# Patient Record
Sex: MALE | Race: WHITE | NOT HISPANIC OR LATINO | Employment: OTHER | URBAN - METROPOLITAN AREA
[De-identification: names, ages, dates, MRNs, and addresses within clinical notes are randomized per-mention and may not be internally consistent; named-entity substitution may affect disease eponyms.]

---

## 2017-09-22 ENCOUNTER — RADIANT APPOINTMENT (OUTPATIENT)
Dept: GENERAL RADIOLOGY | Facility: CLINIC | Age: 72
End: 2017-09-22
Attending: PHYSICIAN ASSISTANT
Payer: COMMERCIAL

## 2017-09-22 ENCOUNTER — OFFICE VISIT (OUTPATIENT)
Dept: FAMILY MEDICINE | Facility: CLINIC | Age: 72
End: 2017-09-22
Payer: COMMERCIAL

## 2017-09-22 VITALS
SYSTOLIC BLOOD PRESSURE: 138 MMHG | TEMPERATURE: 98 F | HEART RATE: 74 BPM | RESPIRATION RATE: 16 BRPM | DIASTOLIC BLOOD PRESSURE: 80 MMHG | WEIGHT: 187 LBS | BODY MASS INDEX: 26.18 KG/M2 | HEIGHT: 71 IN | OXYGEN SATURATION: 96 %

## 2017-09-22 DIAGNOSIS — R10.32 LLQ ABDOMINAL PAIN: ICD-10-CM

## 2017-09-22 DIAGNOSIS — R30.0 DYSURIA: Primary | ICD-10-CM

## 2017-09-22 DIAGNOSIS — R39.11 URINARY HESITANCY: ICD-10-CM

## 2017-09-22 DIAGNOSIS — N41.0 PROSTATITIS, ACUTE: ICD-10-CM

## 2017-09-22 DIAGNOSIS — R35.0 URINARY FREQUENCY: ICD-10-CM

## 2017-09-22 LAB
ALBUMIN UR-MCNC: NEGATIVE MG/DL
ANION GAP SERPL CALCULATED.3IONS-SCNC: 12 MMOL/L (ref 3–14)
APPEARANCE UR: CLEAR
BILIRUB UR QL STRIP: NEGATIVE
BUN SERPL-MCNC: 19 MG/DL (ref 7–30)
CALCIUM SERPL-MCNC: 9.1 MG/DL (ref 8.5–10.1)
CHLORIDE SERPL-SCNC: 104 MMOL/L (ref 94–109)
CO2 SERPL-SCNC: 22 MMOL/L (ref 20–32)
COLOR UR AUTO: YELLOW
CREAT SERPL-MCNC: 1.01 MG/DL (ref 0.66–1.25)
ERYTHROCYTE [DISTWIDTH] IN BLOOD BY AUTOMATED COUNT: 12.4 % (ref 10–15)
GFR SERPL CREATININE-BSD FRML MDRD: 72 ML/MIN/1.7M2
GLUCOSE SERPL-MCNC: 93 MG/DL (ref 70–99)
GLUCOSE UR STRIP-MCNC: NEGATIVE MG/DL
HCT VFR BLD AUTO: 45.3 % (ref 40–53)
HGB BLD-MCNC: 15.8 G/DL (ref 13.3–17.7)
HGB UR QL STRIP: ABNORMAL
KETONES UR STRIP-MCNC: NEGATIVE MG/DL
LEUKOCYTE ESTERASE UR QL STRIP: NEGATIVE
MCH RBC QN AUTO: 33.1 PG (ref 26.5–33)
MCHC RBC AUTO-ENTMCNC: 34.9 G/DL (ref 31.5–36.5)
MCV RBC AUTO: 95 FL (ref 78–100)
NITRATE UR QL: NEGATIVE
PH UR STRIP: 5.5 PH (ref 5–7)
PLATELET # BLD AUTO: 199 10E9/L (ref 150–450)
POTASSIUM SERPL-SCNC: 4.1 MMOL/L (ref 3.4–5.3)
RBC # BLD AUTO: 4.78 10E12/L (ref 4.4–5.9)
RBC #/AREA URNS AUTO: NORMAL /HPF
SODIUM SERPL-SCNC: 138 MMOL/L (ref 133–144)
SOURCE: ABNORMAL
SP GR UR STRIP: <=1.005 (ref 1–1.03)
UROBILINOGEN UR STRIP-ACNC: 0.2 EU/DL (ref 0.2–1)
WBC # BLD AUTO: 6.6 10E9/L (ref 4–11)
WBC #/AREA URNS AUTO: NORMAL /HPF

## 2017-09-22 PROCEDURE — 80048 BASIC METABOLIC PNL TOTAL CA: CPT | Performed by: PHYSICIAN ASSISTANT

## 2017-09-22 PROCEDURE — 36415 COLL VENOUS BLD VENIPUNCTURE: CPT | Performed by: PHYSICIAN ASSISTANT

## 2017-09-22 PROCEDURE — 99214 OFFICE O/P EST MOD 30 MIN: CPT | Performed by: PHYSICIAN ASSISTANT

## 2017-09-22 PROCEDURE — 74010 XR KUB: CPT | Mod: 52

## 2017-09-22 PROCEDURE — 85027 COMPLETE CBC AUTOMATED: CPT | Performed by: PHYSICIAN ASSISTANT

## 2017-09-22 PROCEDURE — 81001 URINALYSIS AUTO W/SCOPE: CPT | Performed by: PHYSICIAN ASSISTANT

## 2017-09-22 RX ORDER — SULFAMETHOXAZOLE/TRIMETHOPRIM 800-160 MG
1 TABLET ORAL 2 TIMES DAILY
Qty: 42 TABLET | Refills: 0 | Status: SHIPPED | OUTPATIENT
Start: 2017-09-22 | End: 2017-10-13

## 2017-09-22 RX ORDER — TAMSULOSIN HYDROCHLORIDE 0.4 MG/1
0.4 CAPSULE ORAL DAILY
Qty: 30 CAPSULE | Refills: 1 | Status: SHIPPED | OUTPATIENT
Start: 2017-09-22 | End: 2018-11-16

## 2017-09-22 ASSESSMENT — PAIN SCALES - GENERAL: PAINLEVEL: MODERATE PAIN (5)

## 2017-09-22 NOTE — PROGRESS NOTES
SUBJECTIVE:   Jaron Carrera is a 72 year old male who presents to clinic today for the following health issues:    Genitourinary symptoms      Duration: 5 days    Description:  retention, back pain and pain in testicle    Intensity:  moderate    Accompanying signs and symptoms (fever/discharge/nausea/vomiting/back or abdominal pain):  Pelvic and abdominal    History (frequent UTI's/kidney stones/prostate problems): None  Sexually active: YES    Precipitating or alleviating factors: None    Therapies tried and outcome: heating pad on lower abdominal area and ibuprofen   Outcome: still having issues      Decreased urine stream and difficulty emptying his bladder completely.  Some hesitancy. Some urinary frequency. No f/c. No n/v/d/c.  Some left flank pain.   Problem list and histories reviewed & adjusted, as indicated.  Additional history: as documented    Patient Active Problem List   Diagnosis     Vitamin D deficiency     Vitamin B12 deficiency     CARDIOVASCULAR SCREENING; LDL GOAL LESS THAN 160     Advanced directives, counseling/discussion     Pure hyperglyceridemia     Screening for prostate cancer     Past Surgical History:   Procedure Laterality Date     COLONOSCOPY  3/21/2013    Procedure: COLONOSCOPY;  COLONOSCOPY SCREEN;  Surgeon: Walker Thompson MD;  Location:  OR      ESOPHAGOSCOPY, DIAGNOSTIC       SURGICAL HISTORY OF -   1995    Varicose Veins- Stripping      SURGICAL HISTORY OF -   1965    Lt Knee Surgery torn MM        Social History   Substance Use Topics     Smoking status: Former Smoker     Packs/day: 1.00     Years: 2.00     Types: Cigarettes     Quit date: 7/20/1970     Smokeless tobacco: Never Used     Alcohol use 1.0 oz/week     2 Cans of beer per week      Comment: 1-2 beers weekly     Family History   Problem Relation Age of Onset     Respiratory Father      COPD     Lipids Brother      Prostate Cancer Brother      Other Cancer Brother      Thyroid Disease Daughter       C.A.D. No family hx of      Cancer - colorectal No family hx of      CEREBROVASCULAR DISEASE No family hx of      DIABETES No family hx of          Recent Labs   Lab Test  10/18/16   0805  10/14/15   0839  09/30/14   0855  09/17/13   0833   LDL  110*  117  104  110   HDL  50  49  53  45   TRIG  211*  185*  136  196*   ALT   --    --   34   --    CR   --    --   1.10  1.01   GFRESTIMATED   --    --   66  73   GFRESTBLACK   --    --   80  89   POTASSIUM   --    --   4.0  4.4   TSH   --    --    --   1.80      BP Readings from Last 3 Encounters:   09/22/17 138/80   11/01/16 122/74   10/27/15 133/75    Wt Readings from Last 3 Encounters:   09/22/17 187 lb (84.8 kg)   11/01/16 189 lb (85.7 kg)   10/27/15 187 lb (84.8 kg)                          Reviewed and updated as needed this visit by clinical staffTobacco  Allergies  Meds  Med Hx  Surg Hx  Fam Hx  Soc Hx      Reviewed and updated as needed this visit by Provider         All other systems negative except as outline above  OBJECTIVE:  Eye exam - right eye normal lid, conjunctiva, cornea, pupil and fundus, left eye normal lid, conjunctiva, cornea, pupil and fundus.  CHEST:chest clear to IPPA, no tachypnea, retractions or cyanosis and S1, S2 normal, no murmur, no gallop, rate regular.  Thyroid not palpable, not enlarged, no nodules detected.  Prostate: no tenderness. No obvious swelling or enlargement   ABDOMEN: mild tenderness in the LLQ and lower abdomen area, without rebound, guarding, mass or organomegaly. Abdomen is soft and bowel sounds are normal.  Genitals normal; both testes normal without tenderness, masses, hydroceles, varicoceles, erythema or swelling. Shaft normal, circumcised, meatus normal without discharge. No inguinal hernia noted. No inguinal lymphadenopathy.  Patient denies dysuria    Jaron was seen today for uti.    Diagnoses and all orders for this visit:    Dysuria  -     *UA reflex to Microscopic and Culture ([place] and [facility]  (except [place] and [place])  -     Urine Microscopic    LLQ abdominal pain  -     XR KUB; Future  -     CBC with platelets  -     Basic metabolic panel  (Ca, Cl, CO2, Creat, Gluc, K, Na, BUN)    Urinary frequency  -     Basic metabolic panel  (Ca, Cl, CO2, Creat, Gluc, K, Na, BUN)    Prostatitis, acute  -     CBC with platelets    Urinary hesitancy  -     sulfamethoxazole-trimethoprim (BACTRIM DS/SEPTRA DS) 800-160 MG per tablet; Take 1 tablet by mouth 2 times daily for 21 days  -     tamsulosin (FLOMAX) 0.4 MG capsule; Take 1 capsule (0.4 mg) by mouth daily      Advised supportive and symptomatic treatment.  Follow up with Provider - if condition persists or worsens.   work on lifestyle modification

## 2017-09-22 NOTE — NURSING NOTE
"Chief Complaint   Patient presents with     UTI       Initial /80  Pulse 74  Temp 98  F (36.7  C) (Oral)  Resp 16  Ht 5' 10.75\" (1.797 m)  Wt 187 lb (84.8 kg)  SpO2 96%  BMI 26.27 kg/m2 Estimated body mass index is 26.27 kg/(m^2) as calculated from the following:    Height as of this encounter: 5' 10.75\" (1.797 m).    Weight as of this encounter: 187 lb (84.8 kg).  Medication Reconciliation: complete    "

## 2017-09-22 NOTE — MR AVS SNAPSHOT
"              After Visit Summary   9/22/2017    Jaron Carrera    MRN: [id number]           Patient Information     Date Of Birth          1945        Visit Information        Provider Department      9/22/2017 10:00 AM Cirilo Spain PA-C [facility]        Today's Diagnoses     Dysuria    -  1    LLQ abdominal pain        Urinary frequency        Prostatitis, acute        Urinary hesitancy           Follow-ups after your visit        Who to contact     Normal or non-critical lab and imaging results will be communicated to you by Exelonixhart, letter or phone within 4 business days after the clinic has received the results. If you do not hear from us within 7 days, please contact the clinic through FookyZt or phone. If you have a critical or abnormal lab result, we will notify you by phone as soon as possible.  Submit refill requests through TapShield or call your pharmacy and they will forward the refill request to us. Please allow 3 business days for your refill to be completed.          If you need to speak with a  for additional information , please call: [phone]             Additional Information About Your Visit        ExelonixharMuchasa Information     TapShield gives you secure access to your electronic health record. If you see a primary care provider, you can also send messages to your care team and make appointments. If you have questions, please call your primary care clinic.  If you do not have a primary care provider, please call [phone] and they will assist you.        Care EveryWhere ID     This is your Care EveryWhere ID. This could be used by other organizations to access your [place] medical records  [id number]        Your Vitals Were     Pulse Temperature Respirations Height Pulse Oximetry BMI (Body Mass Index)    74 98  F (36.7  C) (Oral) 16 5' 10.75\" (1.797 m) 96% 26.27 kg/m2       Blood Pressure from Last 3 Encounters:   09/22/17 138/80   11/01/16 122/74 "   10/27/15 133/75    Weight from Last 3 Encounters:   09/22/17 187 lb (84.8 kg)   11/01/16 189 lb (85.7 kg)   10/27/15 187 lb (84.8 kg)              We Performed the Following     *UA reflex to Microscopic and Culture ([place] and [facility] (except [place] and [place])     Basic metabolic panel  (Ca, Cl, CO2, Creat, Gluc, K, Na, BUN)     CBC with platelets     Urine Microscopic          Today's Medication Changes          These changes are accurate as of: 9/22/17 11:26 AM.  If you have any questions, ask your nurse or doctor.               Start taking these medicines.        Dose/Directions    sulfamethoxazole-trimethoprim 800-160 MG per tablet   Commonly known as:  BACTRIM DS/SEPTRA DS   Used for:  Urinary hesitancy   Started by:  Cirilo Spain PA-C        Dose:  1 tablet   Take 1 tablet by mouth 2 times daily for 21 days   Quantity:  42 tablet   Refills:  0       tamsulosin 0.4 MG capsule   Commonly known as:  FLOMAX   Used for:  Urinary hesitancy   Started by:  Cirilo Spain PA-C        Dose:  0.4 mg   Take 1 capsule (0.4 mg) by mouth daily   Quantity:  30 capsule   Refills:  1         Stop taking these medicines if you haven't already. Please contact your care team if you have questions.     GLUCOSAMINE SULFATE PO   Stopped by:  Cirilo Spain PA-C                Where to get your medicines      [id number] [place] - [address]  [address] [place] [address]     Phone:  [phone]     sulfamethoxazole-trimethoprim 800-160 MG per tablet    tamsulosin 0.4 MG capsule                Primary Care Provider Office Phone # Fax #    Cirilo Spain PA-C [phone] [address]  [place] [address]        Equal Access to Services     GAURANG TRIMBLE : Agatha grimaldoo Soomaali, waaxda luqadaha, qaybta kaalmada adeegyada, waxay cyrus flores. So [facility] [phone].    ATENCIÓN: Si fahad  español, tiene a francois disposición servicios gratuitos de asistencia lingüística. Jean Marie mcpherson [phone].    We comply with applicable federal civil rights laws and [place] laws. We do not discriminate on the basis of race, color, national origin, age, disability sex, sexual orientation or gender identity.            Thank you!     Thank you for choosing [facility]  for your care. Our goal is always to provide you with excellent care. Hearing back from our patients is one way we can continue to improve our services. Please take a few minutes to complete the written survey that you may receive in the mail after your visit with us. Thank you!             Your Updated Medication List - Protect others around you: Learn how to safely use, store and throw away your medicines at www.disposemymeds.org.          This list is accurate as of: 9/22/17 11:26 AM.  Always use your most recent med list.                   Brand Name Dispense Instructions for use Diagnosis    aspirin 81 MG tablet      1 TABLET DAILY        sulfamethoxazole-trimethoprim 800-160 MG per tablet    BACTRIM DS/SEPTRA DS    42 tablet    Take 1 tablet by mouth 2 times daily for 21 days    Urinary hesitancy       tamsulosin 0.4 MG capsule    FLOMAX    30 capsule    Take 1 capsule (0.4 mg) by mouth daily    Urinary hesitancy       VITAMIN B-12 PO      daily        VITAMIN D PO      daily

## 2018-02-27 ENCOUNTER — OFFICE VISIT (OUTPATIENT)
Dept: FAMILY MEDICINE | Facility: CLINIC | Age: 73
End: 2018-02-27
Payer: COMMERCIAL

## 2018-02-27 VITALS
WEIGHT: 185 LBS | BODY MASS INDEX: 25.9 KG/M2 | RESPIRATION RATE: 18 BRPM | TEMPERATURE: 98.5 F | SYSTOLIC BLOOD PRESSURE: 121 MMHG | HEIGHT: 71 IN | HEART RATE: 94 BPM | DIASTOLIC BLOOD PRESSURE: 72 MMHG | OXYGEN SATURATION: 97 %

## 2018-02-27 DIAGNOSIS — D69.6 THROMBOCYTOPENIA (H): ICD-10-CM

## 2018-02-27 DIAGNOSIS — H53.143 PHOTOPHOBIA OF BOTH EYES: Primary | ICD-10-CM

## 2018-02-27 DIAGNOSIS — G62.9 NEUROPATHY: ICD-10-CM

## 2018-02-27 DIAGNOSIS — R51.9 NONINTRACTABLE EPISODIC HEADACHE, UNSPECIFIED HEADACHE TYPE: ICD-10-CM

## 2018-02-27 LAB
CRP SERPL-MCNC: 60.8 MG/L (ref 0–8)
ERYTHROCYTE [DISTWIDTH] IN BLOOD BY AUTOMATED COUNT: 12.4 % (ref 10–15)
ERYTHROCYTE [SEDIMENTATION RATE] IN BLOOD BY WESTERGREN METHOD: 39 MM/H (ref 0–20)
HCT VFR BLD AUTO: 39.1 % (ref 40–53)
HGB BLD-MCNC: 13.1 G/DL (ref 13.3–17.7)
MCH RBC QN AUTO: 31.1 PG (ref 26.5–33)
MCHC RBC AUTO-ENTMCNC: 33.5 G/DL (ref 31.5–36.5)
MCV RBC AUTO: 93 FL (ref 78–100)
PLATELET # BLD AUTO: 79 10E9/L (ref 150–450)
RBC # BLD AUTO: 4.21 10E12/L (ref 4.4–5.9)
WBC # BLD AUTO: 7.6 10E9/L (ref 4–11)

## 2018-02-27 PROCEDURE — 85652 RBC SED RATE AUTOMATED: CPT | Performed by: PHYSICIAN ASSISTANT

## 2018-02-27 PROCEDURE — 85027 COMPLETE CBC AUTOMATED: CPT | Performed by: PHYSICIAN ASSISTANT

## 2018-02-27 PROCEDURE — 99214 OFFICE O/P EST MOD 30 MIN: CPT | Performed by: PHYSICIAN ASSISTANT

## 2018-02-27 PROCEDURE — 85060 BLOOD SMEAR INTERPRETATION: CPT | Performed by: PHYSICIAN ASSISTANT

## 2018-02-27 PROCEDURE — 36415 COLL VENOUS BLD VENIPUNCTURE: CPT | Performed by: PHYSICIAN ASSISTANT

## 2018-02-27 PROCEDURE — 86140 C-REACTIVE PROTEIN: CPT | Performed by: PHYSICIAN ASSISTANT

## 2018-02-27 RX ORDER — VALACYCLOVIR HYDROCHLORIDE 1 G/1
1000 TABLET, FILM COATED ORAL 3 TIMES DAILY
Qty: 21 TABLET | Refills: 0 | Status: SHIPPED | OUTPATIENT
Start: 2018-02-27 | End: 2018-11-16

## 2018-02-27 RX ORDER — PREDNISONE 10 MG/1
TABLET ORAL
Qty: 42 TABLET | Refills: 0 | Status: SHIPPED | OUTPATIENT
Start: 2018-02-27 | End: 2018-04-24

## 2018-02-27 NOTE — PROGRESS NOTES
SUBJECTIVE:   Jaron Carrera is a 73 year old male who presents to clinic today for the following health issues:      Rash--follow up eyes sensitive to light. Stiff neck, scalp sensitivity with rash as well as stiff/sore shoulders. Sore throat.  Onset: January 2018    Description:     Fatigue. No blurred or double vision. No tooth pain. Still notes a headache. Posterior discomfort with cervicalgia (mostly right sided). No hearing changes or tinnitus. No edema. No n/v/d/c.  Reviewed and updated as needed this visit by clinical staff  Tobacco  Allergies  Meds       Reviewed and updated as needed this visit by Provider         All other systems negative except as outline above  OBJECTIVE:    Eye exam - right eye normal lid, conjunctiva, cornea, pupil and fundus, left eye normal lid, conjunctiva, cornea, pupil and fundus.  Thyroid not palpable, not enlarged, no nodules detected.  CHEST:chest clear to IPPA, no tachypnea, retractions or cyanosis and S1, S2 normal, no murmur, no gallop, rate regular.  ENT exam reveals - ENT exam normal, no neck nodes or sinus tenderness.  Mild scalp redness. No concerning lymphadenopathy.  Some cervical trigger points.  His disks are flat. Pupils equal, round, reactive to light. Extraocular movements full. Visual fields full. Face moves symmetrically. Tongue midline. Hearing mildly decreased to finger-rubbing at approximately 6-8 inches. Neck without bruits. CV: S1, S2. Motor strength 5/5. Reflexes were 2/4. Toe signs were downgoing. Normal position sense. Good finger-nose-finger and fine finger movement. Gait: he sangeeta from a chair without difficulty and has a mildly broad-based gait.  No temporal aa tenderness or bruit.     Jaron was seen today for derm problem.    Diagnoses and all orders for this visit:    Photophobia of both eyes  -     OPHTHALMOLOGY ADULT REFERRAL    Nonintractable episodic headache, unspecified headache type    Neuropathy  -     predniSONE (DELTASONE) 10 MG  tablet; Take 60 mg days 1 and 2, 50 mg days 3 and 4, 40 mg days 5 and 6, 30 mg days 7 and 8, 20 mg days 9 and 10, 10 mg days 11 and 12  -     Erythrocyte sedimentation rate auto  -     CRP inflammation  -     CBC with platelets  -     MR Brain w/o Contrast; Future  -     Erythrocyte sedimentation rate auto; Future  -     valACYclovir (VALTREX) 1000 mg tablet; Take 1 tablet (1,000 mg) by mouth 3 times daily    Thrombocytopenia (H)  -     Blood Morphology Pathologist Review  -     **CBC with platelets FUTURE 14d; Future  -     Cancel: Anaplasma phagocytoph antibody IgG IgM  -     Anaplasma phagocytoph antibody IgG IgM; Future      Recheck cbc , sed rate, and anaplasma via lab visit in 3 days.  Advised supportive and symptomatic treatment.  Follow up with Provider - if condition persists or worsens.

## 2018-02-27 NOTE — MR AVS SNAPSHOT
After Visit Summary   2/27/2018    Jaron Carrera    MRN: [id number]           Patient Information     Date Of Birth          1945        Visit Information        Provider Department      2/27/2018 10:40 AM Cirilo Spain PA-C [facility] [place]        Today's Diagnoses     Photophobia of both eyes    -  1    Nonintractable episodic headache, unspecified headache type        Neuropathy        Thrombocytopenia (H)           Follow-ups after your visit        Additional Services     OPHTHALMOLOGY ADULT REFERRAL       Your provider has referred you to: [facility]: [facility] [place] [phone]   [email or web address]    Please be aware that coverage of these services is subject to the terms and limitations of your health insurance plan.  Call member services at your health plan with any benefit or coverage questions.      Please bring the following with you to your appointment:    (1) Any X-Rays, CTs or MRIs which have been performed.  Contact the facility where they were done to arrange for  prior to your scheduled appointment.    (2) List of current medications  (3) This referral request   (4) Any documents/labs given to you for this referral                  Future tests that were ordered for you today     Open Future Orders        Priority Expected Expires Ordered    **CBC with platelets FUTURE 14d Routine 3/6/2018 3/13/2018 2/27/2018    Erythrocyte sedimentation rate auto Routine  2/27/2019 2/27/2018    MR Brain w/o Contrast Routine  2/27/2019 2/27/2018            Who to contact     Normal or non-critical lab and imaging results will be communicated to you by MyChart, letter or phone within 4 business days after the clinic has received the results. If you do not hear from us within 7 days, please contact the clinic through MyChart or phone. If you have a critical or abnormal lab result, we will notify you by phone as soon as possible.  Submit refill requests  "through [email or web address] or call your pharmacy and they will forward the refill request to us. Please allow 3 business days for your refill to be completed.          If you need to speak with a  for additional information , please call: [phone]             Additional Information About Your Visit        CapzlesharNuView Systems Information     [email or web address] gives you secure access to your electronic health record. If you see a primary care provider, you can also send messages to your care team and make appointments. If you have questions, please call your primary care clinic.  If you do not have a primary care provider, please call [phone] and they will assist you.        Care EveryWhere ID     This is your Care EveryWhere ID. This could be used by other organizations to access your [place] medical records  [id number]        Your Vitals Were     Pulse Temperature Respirations Height Pulse Oximetry BMI (Body Mass Index)    94 98.5  F (36.9  C) (Tympanic) 18 5' 10.75\" (1.797 m) 97% 25.98 kg/m2       Blood Pressure from Last 3 Encounters:   02/27/18 121/72   09/22/17 138/80   11/01/16 122/74    Weight from Last 3 Encounters:   02/27/18 185 lb (83.9 kg)   09/22/17 187 lb (84.8 kg)   11/01/16 189 lb (85.7 kg)              We Performed the Following     Blood Morphology Pathologist Review     CBC with platelets     CRP inflammation     Erythrocyte sedimentation rate auto     OPHTHALMOLOGY ADULT REFERRAL          Today's Medication Changes          These changes are accurate as of 2/27/18 12:22 PM.  If you have any questions, ask your nurse or doctor.               Start taking these medicines.        Dose/Directions    predniSONE 10 MG tablet   Commonly known as:  DELTASONE   Used for:  Neuropathy   Started by:  Cirilo Spain PA-C        Take 60 mg days 1 and 2, 50 mg days 3 and 4, 40 mg days 5 and 6, 30 mg days 7 and 8, 20 mg days 9 and 10, 10 mg days 11 and 12   Quantity:  42 tablet   Refills:  0       valACYclovir " 1000 mg tablet   Commonly known as:  VALTREX   Used for:  Neuropathy   Started by:  Cirilo Spain PA-C        Dose:  1000 mg   Take 1 tablet (1,000 mg) by mouth 3 times daily   Quantity:  21 tablet   Refills:  0            Where to get your medicines      [id number] [place] - [address]  [address] [place] [address]     Phone:  [phone]     predniSONE 10 MG tablet    valACYclovir 1000 mg tablet                Primary Care Provider Office Phone # Fax #    Cirilo Spain PA-C [phone]       [address]  [place] [address]        Equal Access to Services     [facility]: Hadii filomena badillo hadasho Soponchoali, waaxda luqadaha, qaybta kaalmada adeegyada, adina cabrera . So [facility] [phone].    ATENCIÓN: Si habla español, tiene a francois disposición servicios gratuitos de asistencia lingüística. Ll[facility] [phone].    We comply with applicable federal civil rights laws and [place] laws. We do not discriminate on the basis of race, color, national origin, age, disability, sex, sexual orientation, or gender identity.            Thank you!     Thank you for choosing [facility]  for your care. Our goal is always to provide you with excellent care. Hearing back from our patients is one way we can continue to improve our services. Please take a few minutes to complete the written survey that you may receive in the mail after your visit with us. Thank you!             Your Updated Medication List - Protect others around you: Learn how to safely use, store and throw away your medicines at www.disposemymeds.org.          This list is accurate as of 2/27/18 12:22 PM.  Always use your most recent med list.                   Brand Name Dispense Instructions for use Diagnosis    aspirin 81 MG tablet      1 TABLET DAILY        predniSONE 10 MG tablet    DELTASONE    42 tablet    Take 60 mg days 1 and 2, 50 mg  days 3 and 4, 40 mg days 5 and 6, 30 mg days 7 and 8, 20 mg days 9 and 10, 10 mg days 11 and 12    Neuropathy       tamsulosin 0.4 MG capsule    FLOMAX    30 capsule    Take 1 capsule (0.4 mg) by mouth daily    Urinary hesitancy       valACYclovir 1000 mg tablet    VALTREX    21 tablet    Take 1 tablet (1,000 mg) by mouth 3 times daily    Neuropathy       VITAMIN B-12 PO      daily        VITAMIN D PO      daily

## 2018-02-28 ENCOUNTER — RADIANT APPOINTMENT (OUTPATIENT)
Dept: MRI IMAGING | Facility: CLINIC | Age: 73
End: 2018-02-28
Attending: PHYSICIAN ASSISTANT
Payer: COMMERCIAL

## 2018-02-28 ENCOUNTER — TRANSFERRED RECORDS (OUTPATIENT)
Dept: HEALTH INFORMATION MANAGEMENT | Facility: CLINIC | Age: 73
End: 2018-02-28

## 2018-02-28 DIAGNOSIS — G62.9 NEUROPATHY: ICD-10-CM

## 2018-02-28 LAB — COPATH REPORT: NORMAL

## 2018-02-28 PROCEDURE — 70553 MRI BRAIN STEM W/O & W/DYE: CPT | Mod: TC

## 2018-02-28 PROCEDURE — 82565 ASSAY OF CREATININE: CPT

## 2018-02-28 PROCEDURE — A9585 GADOBUTROL INJECTION: HCPCS | Mod: JW | Performed by: PHYSICIAN ASSISTANT

## 2018-02-28 RX ORDER — GADOBUTROL 604.72 MG/ML
10 INJECTION INTRAVENOUS ONCE
Status: COMPLETED | OUTPATIENT
Start: 2018-02-28 | End: 2018-02-28

## 2018-02-28 RX ADMIN — GADOBUTROL 8.5 ML: 604.72 INJECTION INTRAVENOUS at 08:04

## 2018-03-01 LAB
CREAT BLD-MCNC: 0.9 MG/DL (ref 0.5–1.2)
GFR SERPL CREATININE-BSD FRML MDRD: 84 ML/MIN/{1.73_M2}
GFRB: 83

## 2018-03-02 DIAGNOSIS — D69.6 THROMBOCYTOPENIA (H): ICD-10-CM

## 2018-03-02 DIAGNOSIS — G62.9 NEUROPATHY: ICD-10-CM

## 2018-03-02 LAB
ERYTHROCYTE [DISTWIDTH] IN BLOOD BY AUTOMATED COUNT: 12.4 % (ref 10–15)
ERYTHROCYTE [SEDIMENTATION RATE] IN BLOOD BY WESTERGREN METHOD: 26 MM/H (ref 0–20)
HCT VFR BLD AUTO: 39.6 % (ref 40–53)
HGB BLD-MCNC: 13 G/DL (ref 13.3–17.7)
MCH RBC QN AUTO: 30.8 PG (ref 26.5–33)
MCHC RBC AUTO-ENTMCNC: 32.8 G/DL (ref 31.5–36.5)
MCV RBC AUTO: 94 FL (ref 78–100)
PLATELET # BLD AUTO: 169 10E9/L (ref 150–450)
RBC # BLD AUTO: 4.22 10E12/L (ref 4.4–5.9)
WBC # BLD AUTO: ABNORMAL 10E9/L (ref 4–11)

## 2018-03-02 PROCEDURE — 85027 COMPLETE CBC AUTOMATED: CPT | Performed by: PHYSICIAN ASSISTANT

## 2018-03-02 PROCEDURE — 86666 EHRLICHIA ANTIBODY: CPT | Mod: 90 | Performed by: PHYSICIAN ASSISTANT

## 2018-03-02 PROCEDURE — 85652 RBC SED RATE AUTOMATED: CPT | Performed by: PHYSICIAN ASSISTANT

## 2018-03-02 PROCEDURE — 99000 SPECIMEN HANDLING OFFICE-LAB: CPT | Performed by: PHYSICIAN ASSISTANT

## 2018-03-02 PROCEDURE — 36415 COLL VENOUS BLD VENIPUNCTURE: CPT | Performed by: PHYSICIAN ASSISTANT

## 2018-03-04 LAB
A PHAGOCYTOPH IGG TITR SER IF: NORMAL {TITER}
A PHAGOCYTOPH IGM TITR SER IF: NORMAL {TITER}

## 2018-03-05 ENCOUNTER — MYC MEDICAL ADVICE (OUTPATIENT)
Dept: FAMILY MEDICINE | Facility: CLINIC | Age: 73
End: 2018-03-05

## 2018-04-24 ENCOUNTER — OFFICE VISIT (OUTPATIENT)
Dept: FAMILY MEDICINE | Facility: CLINIC | Age: 73
End: 2018-04-24
Payer: COMMERCIAL

## 2018-04-24 VITALS
HEART RATE: 95 BPM | TEMPERATURE: 98.2 F | WEIGHT: 185 LBS | BODY MASS INDEX: 25.9 KG/M2 | DIASTOLIC BLOOD PRESSURE: 70 MMHG | RESPIRATION RATE: 16 BRPM | SYSTOLIC BLOOD PRESSURE: 118 MMHG | OXYGEN SATURATION: 97 % | HEIGHT: 71 IN

## 2018-04-24 DIAGNOSIS — D64.9 LOW HEMOGLOBIN: Primary | ICD-10-CM

## 2018-04-24 DIAGNOSIS — R52 BODY ACHES: ICD-10-CM

## 2018-04-24 DIAGNOSIS — R06.09 DYSPNEA ON EXERTION: ICD-10-CM

## 2018-04-24 DIAGNOSIS — R53.83 FATIGUE, UNSPECIFIED TYPE: ICD-10-CM

## 2018-04-24 LAB
ALBUMIN SERPL-MCNC: 2.8 G/DL (ref 3.4–5)
ALP SERPL-CCNC: 109 U/L (ref 40–150)
ALT SERPL W P-5'-P-CCNC: 28 U/L (ref 0–70)
ANION GAP SERPL CALCULATED.3IONS-SCNC: 8 MMOL/L (ref 3–14)
AST SERPL W P-5'-P-CCNC: 20 U/L (ref 0–45)
BILIRUB SERPL-MCNC: 0.5 MG/DL (ref 0.2–1.3)
BUN SERPL-MCNC: 14 MG/DL (ref 7–30)
CALCIUM SERPL-MCNC: 8.8 MG/DL (ref 8.5–10.1)
CHLORIDE SERPL-SCNC: 101 MMOL/L (ref 94–109)
CK SERPL-CCNC: 49 U/L (ref 30–300)
CO2 SERPL-SCNC: 25 MMOL/L (ref 20–32)
CREAT SERPL-MCNC: 0.91 MG/DL (ref 0.66–1.25)
CRP SERPL-MCNC: 80 MG/L (ref 0–8)
DIFFERENTIAL METHOD BLD: ABNORMAL
ERYTHROCYTE [DISTWIDTH] IN BLOOD BY AUTOMATED COUNT: 13.5 % (ref 10–15)
FERRITIN SERPL-MCNC: 305 NG/ML (ref 26–388)
GFR SERPL CREATININE-BSD FRML MDRD: 82 ML/MIN/1.7M2
GLUCOSE SERPL-MCNC: 80 MG/DL (ref 70–99)
HCT VFR BLD AUTO: 39.3 % (ref 40–53)
HGB BLD-MCNC: 12.6 G/DL (ref 13.3–17.7)
IRON SATN MFR SERPL: 7 % (ref 15–46)
IRON SERPL-MCNC: 19 UG/DL (ref 35–180)
MCH RBC QN AUTO: 28.8 PG (ref 26.5–33)
MCHC RBC AUTO-ENTMCNC: 32.1 G/DL (ref 31.5–36.5)
MCV RBC AUTO: 90 FL (ref 78–100)
PLATELET # BLD AUTO: 187 10E9/L (ref 150–450)
POTASSIUM SERPL-SCNC: 4 MMOL/L (ref 3.4–5.3)
PROT SERPL-MCNC: 8.2 G/DL (ref 6.8–8.8)
RBC # BLD AUTO: 4.38 10E12/L (ref 4.4–5.9)
RETICS # AUTO: 48.3 10E9/L (ref 25–95)
RETICS/RBC NFR AUTO: 1.1 % (ref 0.5–2)
SODIUM SERPL-SCNC: 134 MMOL/L (ref 133–144)
TIBC SERPL-MCNC: 266 UG/DL (ref 240–430)
TSH SERPL DL<=0.005 MIU/L-ACNC: 1.73 MU/L (ref 0.4–4)
VIT B12 SERPL-MCNC: 1291 PG/ML (ref 193–986)
WBC # BLD AUTO: 9.4 10E9/L (ref 4–11)

## 2018-04-24 PROCEDURE — 86747 PARVOVIRUS ANTIBODY: CPT | Mod: 90 | Performed by: PHYSICIAN ASSISTANT

## 2018-04-24 PROCEDURE — 83540 ASSAY OF IRON: CPT | Performed by: PHYSICIAN ASSISTANT

## 2018-04-24 PROCEDURE — 82728 ASSAY OF FERRITIN: CPT | Performed by: PHYSICIAN ASSISTANT

## 2018-04-24 PROCEDURE — 80053 COMPREHEN METABOLIC PANEL: CPT | Performed by: PHYSICIAN ASSISTANT

## 2018-04-24 PROCEDURE — 86038 ANTINUCLEAR ANTIBODIES: CPT | Performed by: PHYSICIAN ASSISTANT

## 2018-04-24 PROCEDURE — 86618 LYME DISEASE ANTIBODY: CPT | Performed by: PHYSICIAN ASSISTANT

## 2018-04-24 PROCEDURE — 82607 VITAMIN B-12: CPT | Performed by: PHYSICIAN ASSISTANT

## 2018-04-24 PROCEDURE — 85060 BLOOD SMEAR INTERPRETATION: CPT | Performed by: PHYSICIAN ASSISTANT

## 2018-04-24 PROCEDURE — 83550 IRON BINDING TEST: CPT | Performed by: PHYSICIAN ASSISTANT

## 2018-04-24 PROCEDURE — 84165 PROTEIN E-PHORESIS SERUM: CPT | Performed by: PHYSICIAN ASSISTANT

## 2018-04-24 PROCEDURE — 86140 C-REACTIVE PROTEIN: CPT | Performed by: PHYSICIAN ASSISTANT

## 2018-04-24 PROCEDURE — 82550 ASSAY OF CK (CPK): CPT | Performed by: PHYSICIAN ASSISTANT

## 2018-04-24 PROCEDURE — 99214 OFFICE O/P EST MOD 30 MIN: CPT | Performed by: PHYSICIAN ASSISTANT

## 2018-04-24 PROCEDURE — 84270 ASSAY OF SEX HORMONE GLOBUL: CPT | Performed by: PHYSICIAN ASSISTANT

## 2018-04-24 PROCEDURE — 82274 ASSAY TEST FOR BLOOD FECAL: CPT | Performed by: PHYSICIAN ASSISTANT

## 2018-04-24 PROCEDURE — 00000402 ZZHCL STATISTIC TOTAL PROTEIN: Performed by: PHYSICIAN ASSISTANT

## 2018-04-24 PROCEDURE — 86431 RHEUMATOID FACTOR QUANT: CPT | Performed by: PHYSICIAN ASSISTANT

## 2018-04-24 PROCEDURE — 36415 COLL VENOUS BLD VENIPUNCTURE: CPT | Performed by: PHYSICIAN ASSISTANT

## 2018-04-24 PROCEDURE — 85025 COMPLETE CBC W/AUTO DIFF WBC: CPT | Performed by: PHYSICIAN ASSISTANT

## 2018-04-24 PROCEDURE — 84403 ASSAY OF TOTAL TESTOSTERONE: CPT | Performed by: PHYSICIAN ASSISTANT

## 2018-04-24 PROCEDURE — 84443 ASSAY THYROID STIM HORMONE: CPT | Performed by: PHYSICIAN ASSISTANT

## 2018-04-24 PROCEDURE — 99000 SPECIMEN HANDLING OFFICE-LAB: CPT | Performed by: PHYSICIAN ASSISTANT

## 2018-04-24 PROCEDURE — 85045 AUTOMATED RETICULOCYTE COUNT: CPT | Performed by: PHYSICIAN ASSISTANT

## 2018-04-24 NOTE — PROGRESS NOTES
SUBJECTIVE:   Jaron Carrera is a 73 year old male who presents to clinic today for the following health issues:      Rash  Onset: 2+ months    Description:   Location: scalp  Character: painful, burning  Itching (Pruritis): YES    Progression of Symptoms:  improving    Accompanying Signs & Symptoms:  Fever: no   Body aches or joint pain: YES  Sore throat symptoms: no   Recent cold symptoms: no     History:   Previous similar rash: YES    Precipitating factors:   Exposure to similar rash: no   New exposures: None   Recent travel: YES    Alleviating factors:      Therapies Tried and outcome: prednisone with some relief    Rash resolved. Still some mild neuralgia on left side of scalp.     Noting some fatigue. No snoring. Lacking energy.   Worse the past 3 wks. No anhedonia.  Some nighttime awakenings (to use the bathroom).  No blood in stools. Some leg and arm pains. No obvious tick bites.   No blood in his stools. No abd pain. No chest pain. No edema.   No palpitations.   Problem list and histories reviewed & adjusted, as indicated.  Additional history: as documented    BP Readings from Last 3 Encounters:   04/24/18 118/70   02/27/18 121/72   09/22/17 138/80    Wt Readings from Last 3 Encounters:   04/24/18 185 lb (83.9 kg)   02/27/18 185 lb (83.9 kg)   09/22/17 187 lb (84.8 kg)                    Reviewed and updated as needed this visit by clinical staff  Tobacco  Allergies  Meds       Reviewed and updated as needed this visit by Provider       All other systems negative except as outline above  OBJECTIVE:  Eye exam - right eye normal lid, conjunctiva, cornea, pupil and fundus, left eye normal lid, conjunctiva, cornea, pupil and fundus.  Thyroid not palpable, not enlarged, no nodules detected.  ENT exam reveals - ENT exam normal, no neck nodes or sinus tenderness.  CHEST:chest clear to IPPA, no tachypnea, retractions or cyanosis and S1, S2 normal, no murmur, no gallop, rate regular.  The abdomen is soft  without tenderness, guarding, mass, rebound or organomegaly. Bowel sounds are normal. No CVA tenderness or inguinal adenopathy noted.  No edema    Jaron was seen today for derm problem.    Diagnoses and all orders for this visit:    Low hemoglobin  -     Fecal colorectal cancer screen FIT; Future    Fatigue, unspecified type  -     Testosterone Free and Total  -     TSH with free T4 reflex  -     Blood Morphology Pathologist Review  -     CBC with platelets differential  -     Reticulocyte Count  -     Protein electrophoresis  -     Comprehensive metabolic panel  -     Vitamin B12  -     Iron and iron binding capacity  -     Ferritin  -     Parvovirus B19 antibodies IgG IgM    Body aches  -     Blood Morphology Pathologist Review  -     CBC with platelets differential  -     Reticulocyte Count  -     Protein electrophoresis  -     Comprehensive metabolic panel  -     Rheumatoid factor  -     CK total  -     SAMMY Scrn Rflx to Titer and Ptrn ([facility])  -     Parvovirus B19 antibodies IgG IgM  -     CRP inflammation      Advised supportive and symptomatic treatment.  Follow up with Provider - if condition persists or worsens.   work on lifestyle modification

## 2018-04-24 NOTE — MR AVS SNAPSHOT
After Visit Summary   4/24/2018    Jaron Carrera    MRN: [id number]           Patient Information     Date Of Birth          1945        Visit Information        Provider Department      4/24/2018 11:20 AM Cirilo Spain PA-C [facility] [place]        Today's Diagnoses     Low hemoglobin    -  1    Fatigue, unspecified type        Body aches           Follow-ups after your visit        Future tests that were ordered for you today     Open Future Orders        Priority Expected Expires Ordered    Fecal colorectal cancer screen FIT Routine 5/15/2018 7/17/2018 4/24/2018            Who to contact     Normal or non-critical lab and imaging results will be communicated to you by L[employer], letter or phone within 4 business days after the clinic has received the results. If you do not hear from us within 7 days, please contact the clinic through [employer] or phone. If you have a critical or abnormal lab result, we will notify you by phone as soon as possible.  Submit refill requests through [employer] or call your pharmacy and they will forward the refill request to us. Please allow 3 business days for your refill to be completed.          If you need to speak with a  for additional information , please call: [phone]             Additional Information About Your Visit        [employer] Information     [employer] gives you secure access to your electronic health record. If you see a primary care provider, you can also send messages to your care team and make appointments. If you have questions, please call your primary care clinic.  If you do not have a primary care provider, please call [phone] and they will assist you.        Care EveryWhere ID     This is your Care EveryWhere ID. This could be used by other organizations to access your [facility] medical records  [id number]        Your Vitals Were     Pulse Temperature Respirations Height Pulse Oximetry BMI (Body Mass Index)  "   95 98.2  F (36.8  C) (Tympanic) 16 5' 10.75\" (1.797 m) 97% 25.98 kg/m2       Blood Pressure from Last 3 Encounters:   04/24/18 118/70   02/27/18 121/72   09/22/17 138/80    Weight from Last 3 Encounters:   04/24/18 185 lb (83.9 kg)   02/27/18 185 lb (83.9 kg)   09/22/17 187 lb (84.8 kg)              We Performed the Following     Anti Nuclear Tanja IgG by IFA with Reflex     Blood Morphology Pathologist Review     CBC with platelets differential     CK total     Comprehensive metabolic panel     CRP inflammation     Ferritin     Iron and iron binding capacity     Parvovirus B19 antibodies IgG IgM     Protein electrophoresis     Reticulocyte Count     Rheumatoid factor     Testosterone Free and Total     TSH with free T4 reflex     Vitamin B12          Today's Medication Changes          These changes are accurate as of 4/24/18 12:41 PM.  If you have any questions, ask your nurse or doctor.               Stop taking these medicines if you haven't already. Please contact your care team if you have questions.     predniSONE 10 MG tablet   Commonly known as:  DELTASONE   Stopped by:  Cirilo Spain PA-C                    Primary Care Provider Office Phone # Fax #    Cirilo Spain PA-C [phone]       [address] [place] [address]        Equal Access to Services     [facility] : Hadii filomena ku hadasho Soomaali, waaxda luqadaha, qaybta kaalmada adeegyada, waxdinesh bridges hayfranklynn gerri cabrera . So [facility] [place] [phone].    ATENCIÓN: Si habla español, tiene a francois disposición servicios gratuitos de asistencia lingüística. Jean Marie al [phone].    We comply with applicable federal civil rights laws and [place] laws. We do not discriminate on the basis of race, color, national origin, age, disability, sex, sexual orientation, or gender identity.            Thank you!     Thank you for choosing [facility]  for your care. Our goal is always to provide you with excellent care. " Hearing back from our patients is one way we can continue to improve our services. Please take a few minutes to complete the written survey that you may receive in the mail after your visit with us. Thank you!             Your Updated Medication List - Protect others around you: Learn how to safely use, store and throw away your medicines at www.disposemymeds.org.          This list is accurate as of 4/24/18 12:41 PM.  Always use your most recent med list.                   Brand Name Dispense Instructions for use Diagnosis    aspirin 81 MG tablet      1 TABLET DAILY        tamsulosin 0.4 MG capsule    FLOMAX    30 capsule    Take 1 capsule (0.4 mg) by mouth daily    Urinary hesitancy       valACYclovir 1000 mg tablet    VALTREX    21 tablet    Take 1 tablet (1,000 mg) by mouth 3 times daily    Neuropathy       VITAMIN B-12 PO      daily        VITAMIN D PO      daily

## 2018-04-25 LAB
ALBUMIN SERPL ELPH-MCNC: 3.1 G/DL (ref 3.7–5.1)
ALPHA1 GLOB SERPL ELPH-MCNC: 0.5 G/DL (ref 0.2–0.4)
ALPHA2 GLOB SERPL ELPH-MCNC: 1.4 G/DL (ref 0.5–0.9)
ANA SER QL IF: NEGATIVE
B BURGDOR IGG+IGM SER QL: 0.01 (ref 0–0.89)
B-GLOBULIN SERPL ELPH-MCNC: 0.8 G/DL (ref 0.6–1)
B19V IGG SER IA-ACNC: 6.96 IV
B19V IGM SER IA-ACNC: 0.12 IV
GAMMA GLOB SERPL ELPH-MCNC: 1.8 G/DL (ref 0.7–1.6)
M PROTEIN SERPL ELPH-MCNC: 1.3 G/DL
PROT PATTERN SERPL ELPH-IMP: ABNORMAL
RHEUMATOID FACT SER NEPH-ACNC: <20 IU/ML (ref 0–20)

## 2018-04-26 LAB
SHBG SERPL-SCNC: 44 NMOL/L (ref 11–80)
TESTOST FREE SERPL-MCNC: 6.01 NG/DL (ref 4.7–24.4)
TESTOST SERPL-MCNC: 360 NG/DL (ref 240–950)

## 2018-04-27 LAB — COPATH REPORT: NORMAL

## 2018-04-29 LAB — HEMOCCULT STL QL IA: NEGATIVE

## 2018-04-30 DIAGNOSIS — D64.9 LOW HEMOGLOBIN: ICD-10-CM

## 2018-05-11 ENCOUNTER — OFFICE VISIT (OUTPATIENT)
Dept: FAMILY MEDICINE | Facility: CLINIC | Age: 73
End: 2018-05-11
Payer: COMMERCIAL

## 2018-05-11 VITALS
BODY MASS INDEX: 25.76 KG/M2 | TEMPERATURE: 97.7 F | HEIGHT: 71 IN | OXYGEN SATURATION: 99 % | WEIGHT: 184 LBS | RESPIRATION RATE: 18 BRPM | SYSTOLIC BLOOD PRESSURE: 112 MMHG | HEART RATE: 96 BPM | DIASTOLIC BLOOD PRESSURE: 67 MMHG

## 2018-05-11 DIAGNOSIS — M79.10 MUSCLE SORENESS: ICD-10-CM

## 2018-05-11 DIAGNOSIS — R79.82 ELEVATED C-REACTIVE PROTEIN (CRP): ICD-10-CM

## 2018-05-11 DIAGNOSIS — D47.2 MGUS (MONOCLONAL GAMMOPATHY OF UNKNOWN SIGNIFICANCE): ICD-10-CM

## 2018-05-11 DIAGNOSIS — R77.8 ABNORMAL SERUM PROTEIN ELECTROPHORESIS: Primary | ICD-10-CM

## 2018-05-11 PROCEDURE — 82232 ASSAY OF BETA-2 PROTEIN: CPT | Performed by: PHYSICIAN ASSISTANT

## 2018-05-11 PROCEDURE — 86335 IMMUNFIX E-PHORSIS/URINE/CSF: CPT | Performed by: PHYSICIAN ASSISTANT

## 2018-05-11 PROCEDURE — 86334 IMMUNOFIX E-PHORESIS SERUM: CPT | Performed by: PHYSICIAN ASSISTANT

## 2018-05-11 PROCEDURE — 36415 COLL VENOUS BLD VENIPUNCTURE: CPT | Performed by: PHYSICIAN ASSISTANT

## 2018-05-11 PROCEDURE — 82784 ASSAY IGA/IGD/IGG/IGM EACH: CPT | Performed by: PHYSICIAN ASSISTANT

## 2018-05-11 PROCEDURE — 99214 OFFICE O/P EST MOD 30 MIN: CPT | Performed by: PHYSICIAN ASSISTANT

## 2018-05-11 NOTE — PROGRESS NOTES
SUBJECTIVE:   Jaron Carrera is a 73 year old male who presents to clinic today for the following health issues:    Results-discuss lab results today per provider    Still noting muscle stiffness and fatigue.  Brother with multiple myeloma.     pnd with mild congestion.  Mild cough      Problem list and histories reviewed & adjusted, as indicated.  Additional history: as documented    BP Readings from Last 3 Encounters:   05/11/18 112/67   04/24/18 118/70   02/27/18 121/72    Wt Readings from Last 3 Encounters:   05/11/18 184 lb (83.5 kg)   04/24/18 185 lb (83.9 kg)   02/27/18 185 lb (83.9 kg)                    Reviewed and updated as needed this visit by clinical staff  Tobacco  Allergies  Meds       Reviewed and updated as needed this visit by Provider         All other systems negative except as outline above  OBJECTIVE:    Eye exam - right eye normal lid, conjunctiva, cornea, pupil and fundus, left eye normal lid, conjunctiva, cornea, pupil and fundus.  Thyroid not palpable, not enlarged, no nodules detected.  CHEST:chest clear to IPPA, no tachypnea, retractions or cyanosis and S1, S2 normal, no murmur, no gallop, rate regular.  No edema  No carotid bruits.    Jaron was seen today for results.    Diagnoses and all orders for this visit:    Abnormal serum protein electrophoresis  -     Beta 2 microglobulin  -     Protein immunofixation urine  -     Protein Immunofixation Serum  -     ONC/HEME ADULT REFERRAL    MGUS (monoclonal gammopathy of unknown significance)  -     ONC/HEME ADULT REFERRAL    Muscle soreness  -     RHEUMATOLOGY REFERRAL    Elevated C-reactive protein (CRP)  -     RHEUMATOLOGY REFERRAL      Advised supportive and symptomatic treatment.  Follow up with Provider - if condition persists or worsens.

## 2018-05-11 NOTE — MR AVS SNAPSHOT
After Visit Summary   5/11/2018    Jaron Carrera    MRN: [id number]           Patient Information     Date Of Birth          1945        Visit Information        Provider Department      5/11/2018 9:20 AM Cirilo Spain PA-C [facility] [place]        Today's Diagnoses     Abnormal serum protein electrophoresis    -  1    MGUS (monoclonal gammopathy of unknown significance)        Muscle soreness        Elevated C-reactive protein (CRP)           Follow-ups after your visit        Additional Services     ONC/HEME ADULT REFERRAL       Your provider has referred you to: [facility]: Cancer Care/Hematology (All Cancer Related Services) - [place] [phone]   [email or web address]    Please be aware that coverage of these services is subject to the terms and limitations of your health insurance plan.  Call member services at your health plan with any benefit or coverage questions.      Please bring the following with you to your appointment:    (1) Any X-Rays, CTs or MRIs which have been performed.  Contact the facility where they were done to arrange for  prior to your scheduled appointment.   (2) List of current medications  (3) This referral request   (4) Any documents/labs given to you for this referral            RHEUMATOLOGY REFERRAL       [place] [facility] [place] [facility] [place]gozi Hedrick [phone]   [email or web address]    Please be aware that coverage of these services is subject to the terms and limitations of your health insurance plan.  Call member services at your health plan with any benefit or coverage questions.      Please bring the following with you to your appointment:    (1) Any X-Rays, CTs or MRIs which have been performed.  Contact the facility where they were done to arrange for  prior to your scheduled appointment.    (2) List of current medications   (3) This  "referral request   (4) Any documents/labs given to you for this referral                  Who to contact     Normal or non-critical lab and imaging results will be communicated to you by [facility], letter or phone within 4 business days after the clinic has received the results. If you do not hear from us within 7 days, please contact the clinic through [facility] or phone. If you have a critical or abnormal lab result, we will notify you by phone as soon as possible.  Submit refill requests through [employer]aworks or call your pharmacy and they will forward the refill request to us. Please allow 3 business days for your refill to be completed.          If you need to speak with a  for additional information , please call: [phone]             Additional Information About Your Visit        [employer] Information     [employer] gives you secure access to your electronic health record. If you see a primary care provider, you can also send messages to your care team and make appointments. If you have questions, please call your primary care clinic.  If you do not have a primary care provider, please call [phone] and they will assist you.        Care EveryWhere ID     This is your Care EveryWhere ID. This could be used by other organizations to access your [facility] medical records  [id number]        Your Vitals Were     Pulse Temperature Respirations Height Pulse Oximetry BMI (Body Mass Index)    96 97.7  F (36.5  C) (Tympanic) 18 5' 10.75\" (1.797 m) 99% 25.84 kg/m2       Blood Pressure from Last 3 Encounters:   05/11/18 112/67   04/24/18 118/70   02/27/18 121/72    Weight from Last 3 Encounters:   05/11/18 184 lb (83.5 kg)   04/24/18 185 lb (83.9 kg)   02/27/18 185 lb (83.9 kg)              We Performed the Following     Beta 2 microglobulin     ONC/HEME ADULT REFERRAL     Protein Immunofixation Serum     Protein immunofixation urine     RHEUMATOLOGY REFERRAL        Primary Care Provider Office Phone # Fax # "    Cirilo Spain PA-C [phone]       [address]  [place] [address]        Equal Access to Services     [facility] [place] : Hadii filomena badillo reo Soponchoali, waaxda luqadaha, qaybta kaalmada ademervinyada, adina duarte layamillashae flores. So [facility] [phone].    ATENCIÓN: Si habla español, tiene a francois disposición servicios gratuitos de asistencia lingüística. Llame al [phone].    We comply with applicable federal civil rights laws and [place] laws. We do not discriminate on the basis of race, color, national origin, age, disability, sex, sexual orientation, or gender identity.            Thank you!     Thank you for choosing [facility]  for your care. Our goal is always to provide you with excellent care. Hearing back from our patients is one way we can continue to improve our services. Please take a few minutes to complete the written survey that you may receive in the mail after your visit with us. Thank you!             Your Updated Medication List - Protect others around you: Learn how to safely use, store and throw away your medicines at www.disposemymeds.org.          This list is accurate as of 5/11/18 10:00 AM.  Always use your most recent med list.                   Brand Name Dispense Instructions for use Diagnosis    aspirin 81 MG tablet      1 TABLET DAILY        tamsulosin 0.4 MG capsule    FLOMAX    30 capsule    Take 1 capsule (0.4 mg) by mouth daily    Urinary hesitancy       valACYclovir 1000 mg tablet    VALTREX    21 tablet    Take 1 tablet (1,000 mg) by mouth 3 times daily    Neuropathy       VITAMIN B-12 PO      daily        VITAMIN D PO      daily

## 2018-05-14 LAB
B2 MICROGLOB SERPL-MCNC: 2.4 MG/L
IGA SERPL-MCNC: 145 MG/DL (ref 70–380)
IGG SERPL-MCNC: 1880 MG/DL (ref 695–1620)
IGM SERPL-MCNC: 62 MG/DL (ref 60–265)
PROT PATTERN SERPL IFE-IMP: ABNORMAL

## 2018-05-15 LAB — PROT ELPH PNL UR ELPH: NORMAL

## 2018-05-16 ENCOUNTER — RADIANT APPOINTMENT (OUTPATIENT)
Dept: CARDIOLOGY | Facility: CLINIC | Age: 73
End: 2018-05-16
Attending: PHYSICIAN ASSISTANT
Payer: COMMERCIAL

## 2018-05-16 DIAGNOSIS — R53.83 FATIGUE, UNSPECIFIED TYPE: ICD-10-CM

## 2018-05-16 DIAGNOSIS — R06.09 DYSPNEA ON EXERTION: ICD-10-CM

## 2018-05-16 PROCEDURE — 93321 DOPPLER ECHO F-UP/LMTD STD: CPT | Mod: 26 | Performed by: INTERNAL MEDICINE

## 2018-05-16 PROCEDURE — 93350 STRESS TTE ONLY: CPT | Mod: TC | Performed by: INTERNAL MEDICINE

## 2018-05-16 PROCEDURE — 93325 DOPPLER ECHO COLOR FLOW MAPG: CPT | Mod: TC | Performed by: INTERNAL MEDICINE

## 2018-05-16 PROCEDURE — 93350 STRESS TTE ONLY: CPT | Mod: 26 | Performed by: INTERNAL MEDICINE

## 2018-05-16 PROCEDURE — 40000264 ECHO STRESS TEST WITH DEFINITY: Performed by: INTERNAL MEDICINE

## 2018-05-16 PROCEDURE — 93325 DOPPLER ECHO COLOR FLOW MAPG: CPT | Mod: 26 | Performed by: INTERNAL MEDICINE

## 2018-05-16 PROCEDURE — 93018 CV STRESS TEST I&R ONLY: CPT | Performed by: INTERNAL MEDICINE

## 2018-05-16 PROCEDURE — 93017 CV STRESS TEST TRACING ONLY: CPT | Performed by: INTERNAL MEDICINE

## 2018-05-16 PROCEDURE — 93352 ADMIN ECG CONTRAST AGENT: CPT | Performed by: INTERNAL MEDICINE

## 2018-05-16 PROCEDURE — 93321 DOPPLER ECHO F-UP/LMTD STD: CPT | Mod: TC | Performed by: INTERNAL MEDICINE

## 2018-05-16 PROCEDURE — 93016 CV STRESS TEST SUPVJ ONLY: CPT | Performed by: INTERNAL MEDICINE

## 2018-05-16 RX ADMIN — Medication 5 ML: at 09:00

## 2018-05-21 ENCOUNTER — ONCOLOGY VISIT (OUTPATIENT)
Dept: ONCOLOGY | Facility: CLINIC | Age: 73
End: 2018-05-21
Payer: COMMERCIAL

## 2018-05-21 ENCOUNTER — RADIANT APPOINTMENT (OUTPATIENT)
Dept: GENERAL RADIOLOGY | Facility: CLINIC | Age: 73
End: 2018-05-21
Attending: INTERNAL MEDICINE
Payer: COMMERCIAL

## 2018-05-21 VITALS
TEMPERATURE: 98.6 F | HEART RATE: 85 BPM | SYSTOLIC BLOOD PRESSURE: 114 MMHG | DIASTOLIC BLOOD PRESSURE: 69 MMHG | RESPIRATION RATE: 15 BRPM | BODY MASS INDEX: 24.5 KG/M2 | WEIGHT: 175 LBS | OXYGEN SATURATION: 98 % | HEIGHT: 71 IN

## 2018-05-21 DIAGNOSIS — D47.2 MONOCLONAL PARAPROTEINEMIA: Primary | ICD-10-CM

## 2018-05-21 DIAGNOSIS — D47.2 MONOCLONAL PARAPROTEINEMIA: ICD-10-CM

## 2018-05-21 LAB
ANION GAP SERPL CALCULATED.3IONS-SCNC: 6 MMOL/L (ref 3–14)
BUN SERPL-MCNC: 19 MG/DL (ref 7–30)
CALCIUM SERPL-MCNC: 8.9 MG/DL (ref 8.5–10.1)
CHLORIDE SERPL-SCNC: 103 MMOL/L (ref 94–109)
CO2 SERPL-SCNC: 28 MMOL/L (ref 20–32)
CREAT SERPL-MCNC: 1.08 MG/DL (ref 0.66–1.25)
ERYTHROCYTE [DISTWIDTH] IN BLOOD BY AUTOMATED COUNT: 13.7 % (ref 10–15)
GFR SERPL CREATININE-BSD FRML MDRD: 67 ML/MIN/1.7M2
GLUCOSE SERPL-MCNC: 112 MG/DL (ref 70–99)
HCT VFR BLD AUTO: 39.3 % (ref 40–53)
HGB BLD-MCNC: 12.2 G/DL (ref 13.3–17.7)
MCH RBC QN AUTO: 27.1 PG (ref 26.5–33)
MCHC RBC AUTO-ENTMCNC: 31 G/DL (ref 31.5–36.5)
MCV RBC AUTO: 87 FL (ref 78–100)
PLATELET # BLD AUTO: 177 10E9/L (ref 150–450)
POTASSIUM SERPL-SCNC: 4 MMOL/L (ref 3.4–5.3)
RBC # BLD AUTO: 4.51 10E12/L (ref 4.4–5.9)
SODIUM SERPL-SCNC: 137 MMOL/L (ref 133–144)
WBC # BLD AUTO: 8.5 10E9/L (ref 4–11)

## 2018-05-21 PROCEDURE — 36415 COLL VENOUS BLD VENIPUNCTURE: CPT | Performed by: INTERNAL MEDICINE

## 2018-05-21 PROCEDURE — 85025 COMPLETE CBC W/AUTO DIFF WBC: CPT | Performed by: INTERNAL MEDICINE

## 2018-05-21 PROCEDURE — 99204 OFFICE O/P NEW MOD 45 MIN: CPT | Performed by: INTERNAL MEDICINE

## 2018-05-21 PROCEDURE — 77074 RADEX OSSEOUS SURVEY LMTD: CPT | Performed by: RADIOLOGY

## 2018-05-21 PROCEDURE — 83883 ASSAY NEPHELOMETRY NOT SPEC: CPT | Performed by: INTERNAL MEDICINE

## 2018-05-21 PROCEDURE — 80048 BASIC METABOLIC PNL TOTAL CA: CPT | Performed by: INTERNAL MEDICINE

## 2018-05-21 ASSESSMENT — PAIN SCALES - GENERAL: PAINLEVEL: NO PAIN (0)

## 2018-05-21 NOTE — MR AVS SNAPSHOT
After Visit Summary   5/21/2018    Jaron Carrera    MRN: [id number]           Patient Information     Date Of Birth          1945        Visit Information        Provider Department      5/21/2018 2:45 PM Rj Leary MD [facility]        Today's Diagnoses     Monoclonal paraproteinemia    -  1       Follow-ups after your visit        Your next 10 appointments already scheduled     May 21, 2018  3:45 PM CDT   XR BONE SURVEY LIMITED with MGXR2   [facility] ([facility])    [address] [place] [id number] [phone]           Please bring a list of your current medicines to your exam. (Include vitamins, minerals and over-thecounter medicines.) Leave your valuables at home.  Tell your doctor if there is a chance you may be pregnant.  You do not need to do anything special for this exam.            May 22, 2018  7:15 AM CDT   [facility] Lab Draw with  [facility] LAB DRAW   [facility] ([facility])    [address] [place] [address]  [address] [place] [address] [phone]            May 22, 2018  7:50 AM CDT   (Arrive by 7:35 AM)   BONE MARROW BIOPSY with ANDRZEJ Gagnon CNP, UU BONE MARROW BIOPSY   [facility] ([facility])    [address] [place] [address]  [address] [place] [address] [phone]           You may eat and drink prior to the procedure. You will have labs drawn prior to the procedure. You will be awake for the procedure. You will need a  to take you home. Please talk to your doctor about when to stop taking blood thinning medications. Please call our triage nurses with any questions that you may have at [phone].              Future tests that were ordered for you today     Open Future Orders        Priority Expected Expires Ordered    Bone marrow biopsy Routine  11/17/2018 5/21/2018    Leukemia Lymphoma  Evaluation (Flow Cytometry) Routine  11/17/2018 5/21/2018    FISH With Professional Interpretation Routine  11/17/2018 5/21/2018    CHROMOSOME BONE MARROW With Professional Interpretation Routine  11/17/2018 5/21/2018    X-ray Metastatic bone survey Routine 5/21/2018 5/21/2019 5/21/2018            Who to contact     If you have questions or need follow up information about today's clinic visit or your schedule please contact [facility] directly at [phone].  Normal or non-critical lab and imaging results will be communicated to you by [employer], letter or phone within 4 business days after the clinic has received the results. If you do not hear from us within 7 days, please contact the clinic through [employer] or phone. If you have a critical or abnormal lab result, we will notify you by phone as soon as possible.  Submit refill requests through [employer] or call your pharmacy and they will forward the refill request to us. Please allow 3 business days for your refill to be completed.          Additional Information About Your Visit        [employer] Information     [employer] gives you secure access to your electronic health record. If you see a primary care provider, you can also send messages to your care team and make appointments. If you have questions, please call your primary care clinic.  If you do not have a primary care provider, please call [phone] and they will assist you.      [employer] is an electronic gateway that provides easy, online access to your medical records. With [employer], you can request a clinic appointment, read your test results, renew a prescription or communicate with your care team.     To access your existing account, please contact your [facility] or call [phone] for assistance.        Care EveryWhere ID     This is your Care EveryWhere ID. This could be used by other organizations to access your [facility] medical records  [id number]    "     Your Vitals Were     Pulse Temperature Respirations Height Pulse Oximetry BMI (Body Mass Index)    85 98.6  F (37  C) 15 1.797 m (5' 10.75\") 98% 24.58 kg/m2       Blood Pressure from Last 3 Encounters:   05/21/18 114/69   05/11/18 112/67   04/24/18 118/70    Weight from Last 3 Encounters:   05/21/18 79.4 kg (175 lb)   05/11/18 83.5 kg (184 lb)   04/24/18 83.9 kg (185 lb)              We Performed the Following     Basic metabolic panel     CBC with platelets differential     CBC with platelets     Kappa and lambda light chain     Protein Electrophoresis 24 Hr Urine - UP        Primary Care Provider Office Phone # Fax #    Cirilo Spain PA-C [phone] [address] [place] [address]        Equal Access to Services     [facility]: Hadii aad ku hadasho Soomaali, waaxda luqadaha, qaybta kaalmada adeegyada, waxay idiin hayaan ademervin kharanancy cabrera . So [facility] [phone].    ATENCIÓN: Si habla español, tiene a francois disposición servicios gratuitos de asistencia lingüística. Llame al [phone].    We comply with applicable federal civil rights laws and [place] laws. We do not discriminate on the basis of race, color, national origin, age, disability, sex, sexual orientation, or gender identity.            Thank you!     Thank you for choosing [facility]  for your care. Our goal is always to provide you with excellent care. Hearing back from our patients is one way we can continue to improve our services. Please take a few minutes to complete the written survey that you may receive in the mail after your visit with us. Thank you!             Your Updated Medication List - Protect others around you: Learn how to safely use, store and throw away your medicines at www.disposemymeds.org.          This list is accurate as of 5/21/18  3:31 PM.  Always use your most recent med list.                   Brand Name Dispense Instructions for use Diagnosis    aspirin 81 MG " tablet      1 TABLET DAILY        IRON SUPPLEMENT PO      Take 45 mg by mouth        metoprolol succinate 25 MG 24 hr tablet    TOPROL-XL    90 tablet    Take 1 tablet (25 mg) by mouth daily    Aortic root enlargement (H)       tamsulosin 0.4 MG capsule    FLOMAX    30 capsule    Take 1 capsule (0.4 mg) by mouth daily    Urinary hesitancy       valACYclovir 1000 mg tablet    VALTREX    21 tablet    Take 1 tablet (1,000 mg) by mouth 3 times daily    Neuropathy       VITAMIN B-12 PO      daily        VITAMIN D PO      daily

## 2018-05-21 NOTE — PROGRESS NOTES
DATE OF VISIT: May 21, 2018    REASON FOR REFERRAL:   Monoclonal paraproteinemia    HISTORY OF PRESENT ILLNESS:     73-year-old male with no significant past medical history who in April 2018 presented to primary care provider with complaints of fatigue. He was noted to have mild anemia was further workup was ordered including protein electrophoresis which revealed monoclonal paraprotein along with elevated ferritin and CRP. Subsequently serum immunofixation and urine immunofixation were requested which revealed 1.3 g/ dl IgG kappa paraprotein and IgG 1880 mg/dl. Subsequent referral to rheumatology for further evaluation.    Patient is accompanied in the clinic today by his wife. Denies fatigue, bone pain, dizziness, lightheadedness, weight loss, recurrent infections, fevers/chills, blood in the stools or any other complaints. He has been pretty healthy all his life except for a recent episode of shingles and heart arrhythmia diagnosed last month for which he is on Beta blocker.    Family History significant for multiple myeloma in brother.      REVIEW OF SYSTEMS:      ROS: 14 point ROS neg other than the symptoms noted above in the HPI.    PAST MEDICAL HISTORY:   Past Medical History:   Diagnosis Date     Vitamin B12 deficiency      Vitamin D deficiencies        PAST SURGICAL HISTORY:   Past Surgical History:   Procedure Laterality Date     COLONOSCOPY  3/21/2013    Procedure: COLONOSCOPY;  COLONOSCOPY SCREEN;  Surgeon: Walker Thompson MD;  Location:  OR      ESOPHAGOSCOPY, DIAGNOSTIC       SURGICAL HISTORY OF -   1995    Varicose Veins- Stripping      SURGICAL HISTORY OF -   1965    Lt Knee Surgery torn MM        ALLERGIES:   Allergies as of 05/21/2018 - King as Reviewed 05/21/2018   Allergen Reaction Noted     Penicillins Hives 09/09/2009       MEDICATIONS:   Current Outpatient Prescriptions   Medication Sig Dispense Refill     ASPIRIN 81 MG PO TABS 1 TABLET DAILY       Ferrous Sulfate (IRON  "SUPPLEMENT PO) Take 45 mg by mouth       metoprolol succinate (TOPROL-XL) 25 MG 24 hr tablet Take 1 tablet (25 mg) by mouth daily 90 tablet 1     VITAMIN B-12 PO daily       VITAMIN D PO daily       tamsulosin (FLOMAX) 0.4 MG capsule Take 1 capsule (0.4 mg) by mouth daily (Patient not taking: Reported on 5/21/2018) 30 capsule 1     valACYclovir (VALTREX) 1000 mg tablet Take 1 tablet (1,000 mg) by mouth 3 times daily (Patient not taking: Reported on 5/21/2018) 21 tablet 0        FAMILY HISTORY:   Family History   Problem Relation Age of Onset     Respiratory Father      COPD     Lipids Brother      Prostate Cancer Brother      Other Cancer Brother      Thyroid Disease Daughter      C.A.D. No family hx of      Cancer - colorectal No family hx of      CEREBROVASCULAR DISEASE No family hx of      DIABETES No family hx of        SOCIAL HISTORY:   Social History     Social History     Marital status:      Spouse name: Alyssa     Number of children: 3     Years of education: N/A     Occupational History     certified  Retired     Social History Main Topics     Smoking status: Former Smoker     Packs/day: 1.00     Years: 2.00     Types: Cigarettes     Quit date: 7/20/1970     Smokeless tobacco: Never Used     Alcohol use 1.0 oz/week     2 Cans of beer per week      Comment: 1-2 beers weekly     Drug use: No     Sexual activity: Yes     Partners: Female     Birth control/ protection: None     Other Topics Concern     Parent/Sibling W/ Cabg, Mi Or Angioplasty Before 65f 55m? No     Social History Narrative       PHYSICAL EXAMINATION:   /69  Pulse 85  Temp 98.6  F (37  C)  Resp 15  Ht 1.797 m (5' 10.75\")  Wt 79.4 kg (175 lb)  SpO2 98%  BMI 24.58 kg/m2  Wt Readings from Last 10 Encounters:   05/21/18 79.4 kg (175 lb)   05/11/18 83.5 kg (184 lb)   04/24/18 83.9 kg (185 lb)   02/27/18 83.9 kg (185 lb)   09/22/17 84.8 kg (187 lb)   11/01/16 85.7 kg (189 lb)   10/27/15 84.8 kg (187 lb)   10/12/15 83.9 " kg (185 lb)   09/30/14 84.4 kg (186 lb)   09/17/13 88 kg (194 lb)      ECOG performance status: 0  Exam:  Constitutional: healthy, alert and no distress  Head: Normocephalic. No masses  Neck: Neck supple. No adenopathy.  ENT: ENT exam normal, no neck nodes or sinus tenderness  Cardiovascular: RRR. No murmurs  Respiratory:  Lungs clear  Gastrointestinal: Abdomen soft, non-tender. BS normal. No masses, organomegaly  Musculoskeletal: extremities normal- no gross deformities noted  Neurologic: Gait normal. Sensation grossly WNL.  Psychiatric: mentation appears normal and affect normal/bright        LABORATORY RESULTS:    Recent Labs   Lab Test  05/21/18   1510  04/24/18   1212   NA  137  134   POTASSIUM  4.0  4.0   CHLORIDE  103  101   BUN  19  14   CR  1.08  0.91   GLC  112*  80   BLAINE  8.9  8.8     Recent Labs   Lab Test  05/21/18   1510  04/24/18   1212  03/02/18   1003   03/01/12   0834   WBC  8.5  9.4  Specimen verified by repeat testing   < >  6.8   HGB  12.2*  12.6*  13.0*   < >  15.9   PLT  177  187  169   < >  209   MCV  87  90  94   < >  95   NEUTROPHIL   --    --    --    --   67.9    < > = values in this interval not displayed.     Recent Labs   Lab Test  04/24/18   1212  09/30/14   0855   BILITOTAL  0.5  1.0   ALKPHOS  109  83   ALT  28  34   AST  20  29   ALBUMIN  2.8*  4.4     TSH   Date Value Ref Range Status   04/24/2018 1.73 0.40 - 4.00 mU/L Final     Component      Latest Ref Rng & Units 4/24/2018 5/11/2018   Albumin Fraction      3.7 - 5.1 g/dL 3.1 (L)    Alpha 1 Fraction      0.2 - 0.4 g/dL 0.5 (H)    Alpha 2 Fraction      0.5 - 0.9 g/dL 1.4 (H)    Beta Fraction      0.6 - 1.0 g/dL 0.8    Gamma Fraction      0.7 - 1.6 g/dL 1.8 (H)    Monoclonal Peak      0.0 g/dL 1.3 (H)    ELP Interpretation:       (Note)    Immunofixation ELP        (Note)   IGG      695 - 1620 mg/dL  1880 (H)   IGA      70 - 380 mg/dL  145   IGM      60 - 265 mg/dL  62   Beta-2-Microglobulin      <2.3 mg/L  2.4 (H)   Immunofix ELP  Urine        (Note)       ASSESSMENT AND PLAN:    73-year-old male with no significant past medical history hopeful workup of fatigue and mild anemia of inflammation in April 2018  was noted to have a monoclonal IgG Kappa paraprotein. Presents to the hematology clinic today for further evaluation and management.      Monoclonal paraproteinemia    Detailed discussion with the patient and wife today about the pathogenesis of plasma cell dyscrasias as well as the concern for end organ damage in case of overt multiple myeloma  No evidence of hypercalcemia, renal failure or significant anemia on lab work so far  Order additional workup today including a light chains, 24-hour urine protein electrophoresis as well as bone marrow aspiration and biopsy and skeletal bone survey to rule out multiple myeloma    Plan:   Kappa and lambda light chain, X-ray Metastatic bone survey, Bone marrow biopsy, Leukemia Lymphoma Evaluation (Flow Cytometry), FISH WithProfessional Interpretation, CBC with platelets  differential, CHROMOSOME BONE MARROW With Professional Interpretation, Protein  Electrophoresis 24 Hr Urine - UP    - Anemia of inflammation vs iron deficiency  Currently on oral iron supplementation daily      RTC in 3 weeks to review  above workup.    The patient is ready to learn, no apparent learning barriers were identified, Diagnosis and treatment plans were explained to the patient. The patient expressed understanding of the content. The patient questions were answered to his satisfaction.    Chart documentation with Dragon Voice recognition Software. Although reviewed after completion, some words and grammatical errors may remain.    Rj Leary MD  Attending Physician   Hematology/Medical Oncology

## 2018-05-21 NOTE — NURSING NOTE
"Oncology Rooming Note    May 21, 2018 2:42 PM   Jaron Carrera is a 73 year old male who presents for:    Chief Complaint   Patient presents with     Oncology Clinic Visit     MGUS     Initial Vitals: /69  Pulse 85  Temp 98.6  F (37  C)  Resp 15  Ht 1.797 m (5' 10.75\")  Wt 79.4 kg (175 lb)  SpO2 98%  BMI 24.58 kg/m2 Estimated body mass index is 24.58 kg/(m^2) as calculated from the following:    Height as of this encounter: 1.797 m (5' 10.75\").    Weight as of this encounter: 79.4 kg (175 lb). Body surface area is 1.99 meters squared.  No Pain (0) Comment: Data Unavailable   No LMP for male patient.  Allergies reviewed: Yes  Medications reviewed: Yes    Medications: Medication refills not needed today.  Pharmacy name entered into [employer]: [id number] [place] [facility] - [place], [place] - [address]        5 minutes for nursing intake (face to face time)     Sherrill Ruvalcaba LPN              "

## 2018-05-21 NOTE — LETTER
5/21/2018         RE: Jaron Carrera  [address] [place] [address]        Dear Colleague,    Thank you for referring your patient, Jaron Carrera, to the [facility]. Please see a copy of my visit note below.    DATE OF VISIT: May 21, 2018    REASON FOR REFERRAL:   Monoclonal paraproteinemia    HISTORY OF PRESENT ILLNESS:     73-year-old male with no significant past medical history who in April 2018 presented to primary care provider with complaints of fatigue. He was noted to have mild anemia was further workup was ordered including protein electrophoresis which revealed monoclonal paraprotein along with elevated ferritin and CRP. Subsequently serum immunofixation and urine immunofixation were requested which revealed 1.3 g/ dl IgG kappa paraprotein and IgG 1880 mg/dl. Subsequent referral to rheumatology for further evaluation.    Patient is accompanied in the clinic today by his wife. Denies fatigue, bone pain, dizziness, lightheadedness, weight loss, recurrent infections, fevers/chills, blood in the stools or any other complaints. He has been pretty healthy all his life except for a recent episode of shingles and heart arrhythmia diagnosed last month for which he is on Beta blocker.    Family History significant for multiple myeloma in brother.      REVIEW OF SYSTEMS:      ROS: 14 point ROS neg other than the symptoms noted above in the HPI.    PAST MEDICAL HISTORY:   Past Medical History:   Diagnosis Date     Vitamin B12 deficiency      Vitamin D deficiencies        PAST SURGICAL HISTORY:   Past Surgical History:   Procedure Laterality Date     COLONOSCOPY  3/21/2013    Procedure: COLONOSCOPY;  COLONOSCOPY SCREEN;  Surgeon: Walker Thompson MD;  Location:  OR      ESOPHAGOSCOPY, DIAGNOSTIC       SURGICAL HISTORY OF -   1995    Varicose Veins- Stripping      SURGICAL HISTORY OF -   1965    Lt Knee Surgery torn MM        ALLERGIES:   Allergies as of 05/21/2018 -  "King as Reviewed 05/21/2018   Allergen Reaction Noted     Penicillins Hives 09/09/2009       MEDICATIONS:   Current Outpatient Prescriptions   Medication Sig Dispense Refill     ASPIRIN 81 MG PO TABS 1 TABLET DAILY       Ferrous Sulfate (IRON SUPPLEMENT PO) Take 45 mg by mouth       metoprolol succinate (TOPROL-XL) 25 MG 24 hr tablet Take 1 tablet (25 mg) by mouth daily 90 tablet 1     VITAMIN B-12 PO daily       VITAMIN D PO daily       tamsulosin (FLOMAX) 0.4 MG capsule Take 1 capsule (0.4 mg) by mouth daily (Patient not taking: Reported on 5/21/2018) 30 capsule 1     valACYclovir (VALTREX) 1000 mg tablet Take 1 tablet (1,000 mg) by mouth 3 times daily (Patient not taking: Reported on 5/21/2018) 21 tablet 0        FAMILY HISTORY:   Family History   Problem Relation Age of Onset     Respiratory Father      COPD     Lipids Brother      Prostate Cancer Brother      Other Cancer Brother      Thyroid Disease Daughter      C.A.D. No family hx of      Cancer - colorectal No family hx of      CEREBROVASCULAR DISEASE No family hx of      DIABETES No family hx of        SOCIAL HISTORY:   Social History     Social History     Marital status:      Spouse name: Alyssa     Number of children: 3     Years of education: N/A     Occupational History     certified  Retired     Social History Main Topics     Smoking status: Former Smoker     Packs/day: 1.00     Years: 2.00     Types: Cigarettes     Quit date: 7/20/1970     Smokeless tobacco: Never Used     Alcohol use 1.0 oz/week     2 Cans of beer per week      Comment: 1-2 beers weekly     Drug use: No     Sexual activity: Yes     Partners: Female     Birth control/ protection: None     Other Topics Concern     Parent/Sibling W/ Cabg, Mi Or Angioplasty Before 65f 55m? No     Social History Narrative       PHYSICAL EXAMINATION:   /69  Pulse 85  Temp 98.6  F (37  C)  Resp 15  Ht 1.797 m (5' 10.75\")  Wt 79.4 kg (175 lb)  SpO2 98%  BMI 24.58 kg/m2  Wt " Readings from Last 10 Encounters:   05/21/18 79.4 kg (175 lb)   05/11/18 83.5 kg (184 lb)   04/24/18 83.9 kg (185 lb)   02/27/18 83.9 kg (185 lb)   09/22/17 84.8 kg (187 lb)   11/01/16 85.7 kg (189 lb)   10/27/15 84.8 kg (187 lb)   10/12/15 83.9 kg (185 lb)   09/30/14 84.4 kg (186 lb)   09/17/13 88 kg (194 lb)      ECOG performance status: 0  Exam:  Constitutional: healthy, alert and no distress  Head: Normocephalic. No masses  Neck: Neck supple. No adenopathy.  ENT: ENT exam normal, no neck nodes or sinus tenderness  Cardiovascular: RRR. No murmurs  Respiratory:  Lungs clear  Gastrointestinal: Abdomen soft, non-tender. BS normal. No masses, organomegaly  Musculoskeletal: extremities normal- no gross deformities noted  Neurologic: Gait normal. Sensation grossly WNL.  Psychiatric: mentation appears normal and affect normal/bright        LABORATORY RESULTS:    Recent Labs   Lab Test  05/21/18   1510  04/24/18   1212   NA  137  134   POTASSIUM  4.0  4.0   CHLORIDE  103  101   BUN  19  14   CR  1.08  0.91   GLC  112*  80   BLAINE  8.9  8.8     Recent Labs   Lab Test  05/21/18   1510  04/24/18   1212  03/02/18   1003   03/01/12   0834   WBC  8.5  9.4  Specimen verified by repeat testing   < >  6.8   HGB  12.2*  12.6*  13.0*   < >  15.9   PLT  177  187  169   < >  209   MCV  87  90  94   < >  95   NEUTROPHIL   --    --    --    --   67.9    < > = values in this interval not displayed.     Recent Labs   Lab Test  04/24/18   1212  09/30/14   0855   BILITOTAL  0.5  1.0   ALKPHOS  109  83   ALT  28  34   AST  20  29   ALBUMIN  2.8*  4.4     TSH   Date Value Ref Range Status   04/24/2018 1.73 0.40 - 4.00 mU/L Final     Component      Latest Ref Rng & Units 4/24/2018 5/11/2018   Albumin Fraction      3.7 - 5.1 g/dL 3.1 (L)    Alpha 1 Fraction      0.2 - 0.4 g/dL 0.5 (H)    Alpha 2 Fraction      0.5 - 0.9 g/dL 1.4 (H)    Beta Fraction      0.6 - 1.0 g/dL 0.8    Gamma Fraction      0.7 - 1.6 g/dL 1.8 (H)    Monoclonal Peak      0.0  g/dL 1.3 (H)    ELP Interpretation:       (Note)    Immunofixation ELP        (Note)   IGG      695 - 1620 mg/dL  1880 (H)   IGA      70 - 380 mg/dL  145   IGM      60 - 265 mg/dL  62   Beta-2-Microglobulin      <2.3 mg/L  2.4 (H)   Immunofix ELP Urine        (Note)       ASSESSMENT AND PLAN:    73-year-old male with no significant past medical history hopeful workup of fatigue and mild anemia of inflammation in April 2018  was noted to have a monoclonal IgG Kappa paraprotein. Presents to the hematology clinic today for further evaluation and management.      Monoclonal paraproteinemia    Detailed discussion with the patient and wife today about the pathogenesis of plasma cell dyscrasias as well as the concern for end organ damage in case of overt multiple myeloma  No evidence of hypercalcemia, renal failure or significant anemia on lab work so far  Order additional workup today including a light chains, 24-hour urine protein electrophoresis as well as bone marrow aspiration and biopsy and skeletal bone survey to rule out multiple myeloma    Plan:   Kappa and lambda light chain, X-ray Metastatic bone survey, Bone marrow biopsy, Leukemia Lymphoma Evaluation (Flow Cytometry), FISH WithProfessional Interpretation, CBC with platelets  differential, CHROMOSOME BONE MARROW With Professional Interpretation, Protein  Electrophoresis 24 Hr Urine - UP    - Anemia of inflammation vs iron deficiency  Currently on oral iron supplementation daily      RTC in 3 weeks to review  above workup.    The patient is ready to learn, no apparent learning barriers were identified, Diagnosis and treatment plans were explained to the patient. The patient expressed understanding of the content. The patient questions were answered to his satisfaction.    Chart documentation with Dragon Voice recognition Software. Although reviewed after completion, some words and grammatical errors may remain.    Rj Leary MD  Attending Physician    Hematology/Medical Oncology        Again, thank you for allowing me to participate in the care of your patient.        Sincerely,        Rj Leary MD

## 2018-05-22 ENCOUNTER — OFFICE VISIT (OUTPATIENT)
Dept: ONCOLOGY | Facility: CLINIC | Age: 73
End: 2018-05-22
Attending: NURSE PRACTITIONER
Payer: COMMERCIAL

## 2018-05-22 ENCOUNTER — APPOINTMENT (OUTPATIENT)
Dept: LAB | Facility: CLINIC | Age: 73
End: 2018-05-22
Attending: INTERNAL MEDICINE
Payer: COMMERCIAL

## 2018-05-22 VITALS
WEIGHT: 182.2 LBS | OXYGEN SATURATION: 97 % | RESPIRATION RATE: 16 BRPM | DIASTOLIC BLOOD PRESSURE: 77 MMHG | HEART RATE: 100 BPM | BODY MASS INDEX: 25.59 KG/M2 | SYSTOLIC BLOOD PRESSURE: 139 MMHG | TEMPERATURE: 98.3 F

## 2018-05-22 DIAGNOSIS — D47.2 MONOCLONAL PARAPROTEINEMIA: ICD-10-CM

## 2018-05-22 LAB
BASOPHILS # BLD AUTO: 0 10E9/L (ref 0–0.2)
BASOPHILS NFR BLD AUTO: 0.4 %
COPATH REPORT: NORMAL
DIFFERENTIAL METHOD BLD: ABNORMAL
EOSINOPHIL # BLD AUTO: 0.2 10E9/L (ref 0–0.7)
EOSINOPHIL NFR BLD AUTO: 1.8 %
ERYTHROCYTE [DISTWIDTH] IN BLOOD BY AUTOMATED COUNT: 13.8 % (ref 10–15)
HCT VFR BLD AUTO: 39.6 % (ref 40–53)
HGB BLD-MCNC: 12.4 G/DL (ref 13.3–17.7)
IMM GRANULOCYTES # BLD: 0.1 10E9/L (ref 0–0.4)
IMM GRANULOCYTES NFR BLD: 0.5 %
KAPPA LC UR-MCNC: 3.55 MG/DL (ref 0.33–1.94)
KAPPA LC/LAMBDA SER: 2.08 {RATIO} (ref 0.26–1.65)
LAMBDA LC SERPL-MCNC: 1.71 MG/DL (ref 0.57–2.63)
LYMPHOCYTES # BLD AUTO: 1.4 10E9/L (ref 0.8–5.3)
LYMPHOCYTES NFR BLD AUTO: 15.2 %
MCH RBC QN AUTO: 27.2 PG (ref 26.5–33)
MCHC RBC AUTO-ENTMCNC: 31.3 G/DL (ref 31.5–36.5)
MCV RBC AUTO: 87 FL (ref 78–100)
MONOCYTES # BLD AUTO: 0.9 10E9/L (ref 0–1.3)
MONOCYTES NFR BLD AUTO: 9 %
NEUTROPHILS # BLD AUTO: 6.9 10E9/L (ref 1.6–8.3)
NEUTROPHILS NFR BLD AUTO: 73.1 %
NRBC # BLD AUTO: 0 10*3/UL
NRBC BLD AUTO-RTO: 0 /100
PLATELET # BLD AUTO: 178 10E9/L (ref 150–450)
RBC # BLD AUTO: 4.56 10E12/L (ref 4.4–5.9)
WBC # BLD AUTO: 9.5 10E9/L (ref 4–11)

## 2018-05-22 PROCEDURE — 40000795 ZZHCL STATISTIC DNA PROCESS AND HOLD: Performed by: NURSE PRACTITIONER

## 2018-05-22 PROCEDURE — 88161 CYTOPATH SMEAR OTHER SOURCE: CPT | Performed by: INTERNAL MEDICINE

## 2018-05-22 PROCEDURE — 40000424 ZZHCL STATISTIC BONE MARROW CORE PERF TC 38221: Performed by: INTERNAL MEDICINE

## 2018-05-22 PROCEDURE — 40001005 ZZHCL STATISTIC FLOW >15 ABY TC 88189: Performed by: INTERNAL MEDICINE

## 2018-05-22 PROCEDURE — 36416 COLLJ CAPILLARY BLOOD SPEC: CPT | Performed by: INTERNAL MEDICINE

## 2018-05-22 PROCEDURE — 88280 CHROMOSOME KARYOTYPE STUDY: CPT | Performed by: INTERNAL MEDICINE

## 2018-05-22 PROCEDURE — 88305 TISSUE EXAM BY PATHOLOGIST: CPT | Performed by: INTERNAL MEDICINE

## 2018-05-22 PROCEDURE — 36415 COLL VENOUS BLD VENIPUNCTURE: CPT

## 2018-05-22 PROCEDURE — 40000611 ZZHCL STATISTIC MORPHOLOGY W/INTERP HEMEPATH TC 85060: Performed by: INTERNAL MEDICINE

## 2018-05-22 PROCEDURE — 88275 CYTOGENETICS 100-300: CPT | Performed by: INTERNAL MEDICINE

## 2018-05-22 PROCEDURE — 88184 FLOWCYTOMETRY/ TC 1 MARKER: CPT | Performed by: INTERNAL MEDICINE

## 2018-05-22 PROCEDURE — 40000951 ZZHCL STATISTIC BONE MARROW INTERP TC 85097: Performed by: INTERNAL MEDICINE

## 2018-05-22 PROCEDURE — 88313 SPECIAL STAINS GROUP 2: CPT | Performed by: INTERNAL MEDICINE

## 2018-05-22 PROCEDURE — 00000161 ZZHCL STATISTIC H-SPHEME PROCESS B/S: Performed by: INTERNAL MEDICINE

## 2018-05-22 PROCEDURE — 88342 IMHCHEM/IMCYTCHM 1ST ANTB: CPT | Performed by: INTERNAL MEDICINE

## 2018-05-22 PROCEDURE — 88311 DECALCIFY TISSUE: CPT | Performed by: INTERNAL MEDICINE

## 2018-05-22 PROCEDURE — 88185 FLOWCYTOMETRY/TC ADD-ON: CPT | Performed by: INTERNAL MEDICINE

## 2018-05-22 PROCEDURE — 88237 TISSUE CULTURE BONE MARROW: CPT | Performed by: INTERNAL MEDICINE

## 2018-05-22 PROCEDURE — 38222 DX BONE MARROW BX & ASPIR: CPT | Mod: ZF | Performed by: NURSE PRACTITIONER

## 2018-05-22 PROCEDURE — 88264 CHROMOSOME ANALYSIS 20-25: CPT | Performed by: INTERNAL MEDICINE

## 2018-05-22 PROCEDURE — 88341 IMHCHEM/IMCYTCHM EA ADD ANTB: CPT | Performed by: INTERNAL MEDICINE

## 2018-05-22 PROCEDURE — 00000058 ZZHCL STATISTIC BONE MARROW ASP PERF TC 38220: Performed by: INTERNAL MEDICINE

## 2018-05-22 PROCEDURE — 88271 CYTOGENETICS DNA PROBE: CPT | Performed by: INTERNAL MEDICINE

## 2018-05-22 ASSESSMENT — PAIN SCALES - GENERAL: PAINLEVEL: NO PAIN (0)

## 2018-05-22 NOTE — PROGRESS NOTES
BMT ONC Adult Bone Marrow Biopsy Procedure Note  May 22, 2018  /77 (BP Location: Right arm, Cuff Size: Adult Regular)  Pulse 100  Temp 98.3  F (36.8  C) (Oral)  Resp 16  Wt 82.6 kg (182 lb 3.2 oz)  SpO2 97%  BMI 25.59 kg/m2     Learning needs assessment complete within 12 months? NO    DIAGNOSIS: Evaluate monocloncal paraproteinemia/anemia     PROCEDURE: Unilateral Bone Marrow Biopsy and Unilateral Aspirate    LOCATION: [facility] 2nd Floor    Patient s identification was positively verified by verbal identification and invasive procedure safety checklist was completed. Informed consent was obtained. The patient declined pre-meds. He was placed in the prone position and prepped and draped in a sterile manner. Approximately 10 cc of 1% Lidocaine was used over the right posterior iliac spine. Following this a 3 mm incision was made. Trephine bone marrow core(s) was (were) obtained from the [facility]. Bone marrow aspirates were obtained from the [facility]. Aspirates were sent for morphology, immunophenotyping, cytogenetics and molecular diagnostics . A total of approximately 16 ml of marrow was aspirated. Following this procedure a sterile dressing was applied to the bone marrow biopsy site(s). The patient was placed in the supine position to maintain pressure on the biopsy site. Post-procedure wound care instructions were given.     Complications: NO    Post-procedural pain assessment: 0 out of 10 on the numeric pain rating scale.     Interventions: NO    Procedure performed by: Hortensia García CNP

## 2018-05-22 NOTE — LETTER
5/22/2018       RE: Jaron Carrera  [address] [place] [address]     Dear Colleague,    Thank you for referring your patient, Jaron Carrera, to the [facility]. Please see a copy of my visit note below.    BMT ONC Adult Bone Marrow Biopsy Procedure Note  May 22, 2018  /77 (BP Location: Right arm, Cuff Size: Adult Regular)  Pulse 100  Temp 98.3  F (36.8  C) (Oral)  Resp 16  Wt 82.6 kg (182 lb 3.2 oz)  SpO2 97%  BMI 25.59 kg/m2     Learning needs assessment complete within 12 months? NO    DIAGNOSIS: Evaluate monocloncal paraproteinemia/anemia     PROCEDURE: Unilateral Bone Marrow Biopsy and Unilateral Aspirate    LOCATION: [facility] 2nd Floor    Patient s identification was positively verified by verbal identification and invasive procedure safety checklist was completed. Informed consent was obtained. The patient declined pre-meds. He was placed in the prone position and prepped and draped in a sterile manner. Approximately 10 cc of 1% Lidocaine was used over the right posterior iliac spine. Following this a 3 mm incision was made. Trephine bone marrow core(s) was (were) obtained from the [facility]. Bone marrow aspirates were obtained from the [facility]. Aspirates were sent for morphology, immunophenotyping, cytogenetics and molecular diagnostics . A total of approximately 16 ml of marrow was aspirated. Following this procedure a sterile dressing was applied to the bone marrow biopsy site(s). The patient was placed in the supine position to maintain pressure on the biopsy site. Post-procedure wound care instructions were given.     Complications: NO    Post-procedural pain assessment: 0 out of 10 on the numeric pain rating scale.     Interventions: NO    Procedure performed by: Hortensia García CNP      Again, thank you for allowing me to participate in the care of your patient.      Sincerely,    ANDRZEJ Raymond CNP

## 2018-05-22 NOTE — NURSING NOTE
Chief Complaint   Patient presents with     Blood Draw     Labs drawn from vpt by RN. Vs taken and pt checked in for appt     Labs collected from venipuncture by RN. Vitals taken. Checked in for appointment(s).    Beverly Diaz RN

## 2018-05-22 NOTE — NURSING NOTE
"Oncology Rooming Note    May 22, 2018 8:01 AM   Jaron Carrera is a 73 year old male who presents for:    Chief Complaint   Patient presents with     Blood Draw     Labs drawn from vpt by RN. Vs taken and pt checked in for appt     Oncology Clinic Visit     Patient with Prostate Cancer here for a bone marrow biopsy      Initial Vitals: /77 (BP Location: Right arm, Cuff Size: Adult Regular)  Pulse 100  Temp 98.3  F (36.8  C) (Oral)  Resp 16  Wt 82.6 kg (182 lb 3.2 oz)  SpO2 97%  BMI 25.59 kg/m2 Estimated body mass index is 25.59 kg/(m^2) as calculated from the following:    Height as of 5/21/18: 1.797 m (5' 10.75\").    Weight as of this encounter: 82.6 kg (182 lb 3.2 oz). Body surface area is 2.03 meters squared.  No Pain (0) Comment: Data Unavailable   No LMP for male patient.  Allergies reviewed: Yes  Medications reviewed: Yes    Medications: Medication refills not needed today.  Pharmacy name entered into [employer]: [id number] [place] [facility] [place], [place] - [address]    Clinical concerns:     0 minutes for nursing intake (face to face time)     Carina Santillan CMA              "

## 2018-05-22 NOTE — MR AVS SNAPSHOT
After Visit Summary   5/22/2018    Jaron Carrera    MRN: [id number]           Patient Information     Date Of Birth          1945        Visit Information        Provider Department      5/22/2018 7:50 AM Hortensia García, ANDRZEJ CNP; [facility] BONE MARROW BIOPSY [facility]        Today's Diagnoses     Monoclonal paraproteinemia           Follow-ups after your visit        Who to contact     If you have questions or need follow up information about today's clinic visit or your schedule please contact [facility] directly at [phone].  Normal or non-critical lab and imaging results will be communicated to you by [employer], letter or phone within 4 business days after the clinic has received the results. If you do not hear from us within 7 days, please contact the clinic through [employer] or phone. If you have a critical or abnormal lab result, we will notify you by phone as soon as possible.  Submit refill requests through [employer] or call your pharmacy and they will forward the refill request to us. Please allow 3 business days for your refill to be completed.          Additional Information About Your Visit        MyChart Information     [employer] gives you secure access to your electronic health record. If you see a primary care provider, you can also send messages to your care team and make appointments. If you have questions, please call your primary care clinic.  If you do not have a primary care provider, please call [phone] and they will assist you.        Care EveryWhere ID     This is your Care EveryWhere ID. This could be used by other organizations to access your [facility] medical records  [id number]        Your Vitals Were     Pulse Temperature Respirations Pulse Oximetry BMI (Body Mass Index)       100 98.3  F (36.8  C) (Oral) 16 97% 25.59 kg/m2        Blood Pressure from Last 3 Encounters:   05/22/18 139/77   05/21/18 114/69   05/11/18 112/67     Weight from Last 3 Encounters:   05/22/18 82.6 kg (182 lb 3.2 oz)   05/21/18 79.4 kg (175 lb)   05/11/18 83.5 kg (184 lb)              We Performed the Following     Bone marrow biopsy     CHROMOSOME BONE MARROW With Professional Interpretation     DX BONE MARROW BIOPSY(IES) & ASPIRATION(S) (Charge)     FISH With Professional Interpretation     Leukemia Lymphoma Evaluation (Flow Cytometry)     Process and hold DNA - Bone Marrow        Primary Care Provider Office Phone # Fax #    Cirilo Spain PA-C [phone] [address]  [place] [address]        Equal Access to Services     [facility]: Hadii filomena Treviño, waaxda lutrevoradaha, qaybta kaalmada alice, adina cabrera . So [facility] [place] [phone].    ATENCIÓN: Si habla español, tiene a francois disposición servicios gratuitos de asistencia lingüística. [facility] [phone].    We comply with applicable federal civil rights laws and [place] laws. We do not discriminate on the basis of race, color, national origin, age, disability, sex, sexual orientation, or gender identity.            Thank you!     Thank you for choosing [facility]  for your care. Our goal is always to provide you with excellent care. Hearing back from our patients is one way we can continue to improve our services. Please take a few minutes to complete the written survey that you may receive in the mail after your visit with us. Thank you!             Your Updated Medication List - Protect others around you: Learn how to safely use, store and throw away your medicines at www.disposemymeds.org.          This list is accurate as of 5/22/18  9:07 AM.  Always use your most recent med list.                   Brand Name Dispense Instructions for use Diagnosis    aspirin 81 MG tablet      1 TABLET DAILY        IRON SUPPLEMENT PO      Take 45 mg by mouth        metoprolol succinate 25 MG 24 hr tablet    TOPROL-XL    90 tablet     Take 1 tablet (25 mg) by mouth daily    Aortic root enlargement (H)       tamsulosin 0.4 MG capsule    FLOMAX    30 capsule    Take 1 capsule (0.4 mg) by mouth daily    Urinary hesitancy       valACYclovir 1000 mg tablet    VALTREX    21 tablet    Take 1 tablet (1,000 mg) by mouth 3 times daily    Neuropathy       VITAMIN B-12 PO      daily        VITAMIN D PO      daily

## 2018-05-22 NOTE — NURSING NOTE
Pt supine for 30 minutes post procedure.  Dressing clean/dry/intact.  Pt ambulatory and accompanied home by wife.    Teaching Topic: Bone Marrow Biopsy    Person(s) involved in teaching: Patient, Spouse  Asks Questions: Yes  Eager to Learn: Yes  Cooperative: Yes  Receptive (willing/able to accept information): Yes  Any cultural factors/Sikh beliefs that may influence understanding or compliance? No    Patient demonstrates understanding of the following:   - Reason for the appointment, diagnosis and treatment plan: Yes  - Knowledge of proper use of medications and conditions for which they are ordered (with special attention to potential side effects or drug interactions): Yes  - Which situations necessitate calling provider and whom to contact: Yes  -Pain management techniques: Yes    Teaching concerns addressed: Keep dressing dry/intact for 24 hours post procedure.    Infection Control:  Signs and symptoms of infection taught: Yes  Wound care taught: Yes    Instructional Materials Used/Given: Post procedure teaching sheet    Time spent with patient: 5 minutes.

## 2018-05-23 LAB
COPATH REPORT: NORMAL
COPATH REPORT: NORMAL

## 2018-06-11 ENCOUNTER — ONCOLOGY VISIT (OUTPATIENT)
Dept: ONCOLOGY | Facility: CLINIC | Age: 73
End: 2018-06-11
Payer: COMMERCIAL

## 2018-06-11 VITALS
BODY MASS INDEX: 25.3 KG/M2 | TEMPERATURE: 97.7 F | DIASTOLIC BLOOD PRESSURE: 71 MMHG | SYSTOLIC BLOOD PRESSURE: 117 MMHG | HEART RATE: 98 BPM | WEIGHT: 180.1 LBS | OXYGEN SATURATION: 98 % | RESPIRATION RATE: 18 BRPM

## 2018-06-11 DIAGNOSIS — D47.2 MGUS (MONOCLONAL GAMMOPATHY OF UNKNOWN SIGNIFICANCE): Primary | ICD-10-CM

## 2018-06-11 DIAGNOSIS — D64.9 NORMOCYTIC ANEMIA: ICD-10-CM

## 2018-06-11 PROCEDURE — 99214 OFFICE O/P EST MOD 30 MIN: CPT | Performed by: INTERNAL MEDICINE

## 2018-06-11 ASSESSMENT — PAIN SCALES - GENERAL: PAINLEVEL: NO PAIN (0)

## 2018-06-11 NOTE — Clinical Note
6/11/2018         RE: Jaron Carrera  [address] [place] [address]        Dear Colleague,    Thank you for referring your patient, Jaron Carrera, to the [facility]. Please see a copy of my visit note below.      FOLLOW-UP VISIT NOTE    PATIENT NAME: Jaron Carrera MRN # [id number]  DATE OF VISIT: Jun 11, 2018 YOB: 1945    REFERRING PROVIDER: Cirilo Spain PA-C  [address] [place] [address]    Reason for follow up   Monoclonal gammopathy    HISTORY:  73-year-old male with no significant past medical history who in April 2018 presented to primary care provider with complaints of fatigue. He was noted to have mild anemia was further workup was ordered including protein electrophoresis which revealed monoclonal paraprotein along with elevated ferritin and CRP. Subsequently serum immunofixation and urine immunofixation were requested which revealed 1.3 g/ dl IgG kappa paraprotein  with IgG 1880 mg/dl.    05/21/18 seen by hematology. Serum free light chains, urine protein electrophoresis, bone marrow biopsy and skeletal survey was ordered.    SUBJECTIVE     Patient is in clinic today for open discussion above workup. No new complaints. Denies fatigue, bone pain, dizziness/lightheadedness, fever/chills or any other issues.      PAST MEDICAL HISTORY     Past Medical History:   Diagnosis Date     Vitamin B12 deficiency      Vitamin D deficiencies          CURRENT OUTPATIENT MEDICATIONS     Current Outpatient Prescriptions   Medication Sig Dispense Refill     ASPIRIN 81 MG PO TABS 1 TABLET DAILY       Ferrous Sulfate (IRON SUPPLEMENT PO) Take 45 mg by mouth       metoprolol succinate (TOPROL-XL) 25 MG 24 hr tablet Take 1 tablet (25 mg) by mouth daily 90 tablet 1     tamsulosin (FLOMAX) 0.4 MG capsule Take 1 capsule (0.4 mg) by mouth daily (Patient not taking: Reported on 5/21/2018) 30 capsule 1     valACYclovir (VALTREX) 1000 mg tablet Take 1 tablet (1,000  mg) by mouth 3 times daily (Patient not taking: Reported on 5/21/2018) 21 tablet 0     VITAMIN B-12 PO daily       VITAMIN D PO daily          ALLERGIES     Allergies   Allergen Reactions     Penicillins Hives     Childhood        REVIEW OF SYSTEMS   As above in the HPI, o/w complete 12-point ROS was negative.     PHYSICAL EXAM   B/P: 117/71, T: 97.7, P: 98, R: 18  SpO2 Readings from Last 4 Encounters:   06/11/18 98%   05/22/18 97%   05/21/18 98%   05/11/18 99%     Wt Readings from Last 3 Encounters:   06/11/18 81.7 kg (180 lb 1.6 oz)   05/22/18 82.6 kg (182 lb 3.2 oz)   05/21/18 79.4 kg (175 lb)     GEN: NAD  EYES:PERRLA  Mouth/ENT: Oropharynx is clear.  LUNGS: clear bilaterally  CV: regular, no murmurs, rubs, or gallops  ABDOMEN: soft, non-tendern  EXT: warm, well perfused, no edema  NEURO: alert  SKIN: no rashes     LABORATORY AND IMAGING STUDIES     Recent Labs   Lab Test  05/21/18   1510  04/24/18   1212   NA  137  134   POTASSIUM  4.0  4.0   CHLORIDE  103  101   CO2  28  25   ANIONGAP  6  8   BUN  19  14   CR  1.08  0.91   GLC  112*  80   BLAINE  8.9  8.8     Recent Labs   Lab Test  05/21/18   1510  05/21/18   0658  04/24/18   1212   03/01/12   0834   WBC  8.5  9.5  9.4   < >  6.8   HGB  12.2*  12.4*  12.6*   < >  15.9   PLT  177  178  187   < >  209   MCV  87  87  90   < >  95   NEUTROPHIL   --   73.1   --    --   67.9    < > = values in this interval not displayed.     Recent Labs   Lab Test  04/24/18   1212  09/30/14   0855   BILITOTAL  0.5  1.0   ALKPHOS  109  83   ALT  28  34   AST  20  29   ALBUMIN  2.8*  4.4     TSH   Date Value Ref Range Status   04/24/2018 1.73 0.40 - 4.00 mU/L Final     [place] Free Lt Chain 3.55 (H) 0.33 - 1.94 mg/dL Final 05/21/2018  3:10 PM 51   Lambda Free Lt Chain 1.71  0.57 - 2.63 mg/dL Final 05/21/2018  3:10 PM 51   Kappa Lambda Ratio 2.08 (H) 0.26 - 1.65  Final 05/21/2018  3:10 PM          05/23/18   TEST(S):   Unilateral Bone Marrow Biopsy/Aspiration     FINAL DIAGNOSIS:   Bone  marrow, posterior iliac crest, right decalcified trephine biopsy and   touch imprint; right particle crush,   direct aspirate smear, and concentrated aspirate smear; and peripheral   blood smear:     - Normocellular marrow (cellularity estimated at 30-40%) with trilineage    hematopoiesis, no increase in blasts     - Approximately 2-3% Kappa predominant plasma cells (see comment)     - Peripheral blood showing slight normochromic, normocytic anemia;     COMMENT:   The findings support plasma cell dyscrasia which, based on available data   (see summary below in clinical history section) would be most consistent with monoclonal gammopathy of uncertain significance.   Final classification requires correlation with all clinical and imaging   findings. Concurrent flow cytometry ([id number]) revealed Kappa monotypic plasma cells. Please correlate also with   results of other ancillary studies    Results for orders placed or performed in visit on 05/21/18   X-ray Metastatic bone survey    Narrative    XR BONE SURVEY LIMITED  5/21/2018 4:05 PM      HISTORY: monoclonal protein- rule out skeletal lesions; Monoclonal  paraproteinemia    COMPARISON: None    FINDINGS:     Normal alignment of the cervical, lumbar, and thoracic spines. Marked  disc height loss of the cervical spine from C2 to C7. No sclerotic or  lytic lesions noted in the thoracic or lumbar spine.     No lesions in the pelvis. Normal appearance of the hips without  degenerative changes. Femurs are unremarkable.     Marked joint space narrowing in the medial tibiofemoral compartment of  the left knee with small 3 mm osteophyte off the medial tibial  plateau. No concerning lesions in the knees. Normal-appearing tibias,  fibulas, and ankles.    There is a 5 mm smooth bony projection off the mid lateral right  humerus, benign exostosis. Normal appearance of the forearms and  wrists.    The bony calvarium appears unremarkable.      Impression    IMPRESSION:   1. No  radiolucent bony lesions to suggest multiple myeloma lesions.  2. Severe disc height loss in the cervical spine from C2 to C7.  3. Exostosis at the mid right humerus.    I have personally reviewed the examination and initial interpretation  and I agree with the findings.    SIXTO BARRY MD         ASSESSMENT AND PLAN     73year-old male with workup of fatigue and mild anemia of inflammation in April 2018  was noted to have a monoclonal IgG Kappa paraprotein.     Monoclonal paraproteinemia    Patient was informed that his workup negative for evidence of end organ damage( CRAB criteria) with bone marrow biopsy showing 2-3% plasma cells  Clinical impression consistent with monoclonal gammopathy of uncertain clinical significance of  IgG Kappa type   Had a discussion with the patient today about the prognosis associated with MGUS.   Low Intermediate risk given abnormal free light chain ratio with a 21 % risk of progression to multiple myeloma over 20 years.  We'll repeat evaluation at 6 months with CBC with differential, chemistry panel, SPEP, UPEP, and I chain ratio    - Anemia of inflammation vs iron deficiency  Currently on oral iron supplementation daily  Repeat CBC in 6 months with iron studies        RTC in 6 months for follow-up with labs    Chart documentation with Dragon Voice recognition Software. Although reviewed after completion, some words and grammatical errors may remain.  Rj Leary MD  Attending Physician   Hematology/Medical Oncology    Again, thank you for allowing me to participate in the care of your patient.        Sincerely,        Rj Leary MD

## 2018-06-11 NOTE — PROGRESS NOTES
FOLLOW-UP VISIT NOTE    PATIENT NAME: Jaron Carrera MRN # [id number]  DATE OF VISIT: Jun 11, 2018 YOB: 1945    REFERRING PROVIDER: Cirilo Spain PA-C  [address] [place] [address]    Reason for follow up   Monoclonal gammopathy    HISTORY:  73-year-old male with no significant past medical history who in April 2018 presented to primary care provider with complaints of fatigue. He was noted to have mild anemia was further workup was ordered including protein electrophoresis which revealed monoclonal paraprotein along with elevated ferritin and CRP. Subsequently serum immunofixation and urine immunofixation were requested which revealed 1.3 g/ dl IgG kappa paraprotein with IgG 1880 mg/dl.    05/21/18 seen by hematology. Serum free light chains, urine protein electrophoresis, bone marrow biopsy and skeletal survey was ordered.    SUBJECTIVE     Patient is in clinic today for open discussion above workup. No new complaints. Denies fatigue, bone pain, dizziness/lightheadedness, fever/chills or any other issues.      PAST MEDICAL HISTORY     Past Medical History:   Diagnosis Date     Vitamin B12 deficiency      Vitamin D deficiencies          CURRENT OUTPATIENT MEDICATIONS     Current Outpatient Prescriptions   Medication Sig Dispense Refill     ASPIRIN 81 MG PO TABS 1 TABLET DAILY       Ferrous Sulfate (IRON SUPPLEMENT PO) Take 45 mg by mouth       metoprolol succinate (TOPROL-XL) 25 MG 24 hr tablet Take 1 tablet (25 mg) by mouth daily 90 tablet 1     tamsulosin (FLOMAX) 0.4 MG capsule Take 1 capsule (0.4 mg) by mouth daily (Patient not taking: Reported on 5/21/2018) 30 capsule 1     valACYclovir (VALTREX) 1000 mg tablet Take 1 tablet (1,000 mg) by mouth 3 times daily (Patient not taking: Reported on 5/21/2018) 21 tablet 0     VITAMIN B-12 PO daily       VITAMIN D PO daily          ALLERGIES     Allergies   Allergen Reactions     Penicillins Hives     Childhood        REVIEW OF SYSTEMS    As above in the HPI, o/w complete 12-point ROS was negative.     PHYSICAL EXAM   B/P: 117/71, T: 97.7, P: 98, R: 18  SpO2 Readings from Last 4 Encounters:   06/11/18 98%   05/22/18 97%   05/21/18 98%   05/11/18 99%     Wt Readings from Last 3 Encounters:   06/11/18 81.7 kg (180 lb 1.6 oz)   05/22/18 82.6 kg (182 lb 3.2 oz)   05/21/18 79.4 kg (175 lb)     GEN: NAD  EYES:PERRLA  Mouth/ENT: Oropharynx is clear.  LUNGS: clear bilaterally  CV: regular, no murmurs, rubs, or gallops  ABDOMEN: soft, non-tendern  EXT: warm, well perfused, no edema  NEURO: alert  SKIN: no rashes     LABORATORY AND IMAGING STUDIES     Recent Labs   Lab Test  05/21/18   1510  04/24/18   1212   NA  137  134   POTASSIUM  4.0  4.0   CHLORIDE  103  101   CO2  28  25   ANIONGAP  6  8   BUN  19  14   CR  1.08  0.91   GLC  112*  80   BLAINE  8.9  8.8     Recent Labs   Lab Test  05/21/18   1510  05/21/18   0658  04/24/18   1212   03/01/12   0834   WBC  8.5  9.5  9.4   < >  6.8   HGB  12.2*  12.4*  12.6*   < >  15.9   PLT  177  178  187   < >  209   MCV  87  87  90   < >  95   NEUTROPHIL   --   73.1   --    --   67.9    < > = values in this interval not displayed.     Recent Labs   Lab Test  04/24/18   1212  09/30/14   0855   BILITOTAL  0.5  1.0   ALKPHOS  109  83   ALT  28  34   AST  20  29   ALBUMIN  2.8*  4.4     TSH   Date Value Ref Range Status   04/24/2018 1.73 0.40 - 4.00 mU/L Final     [place] Free Lt Chain 3.55 (H) 0.33 - 1.94 mg/dL Final 05/21/2018  3:10 PM 51   Lambda Free Lt Chain 1.71  0.57 - 2.63 mg/dL Final 05/21/2018  3:10 PM 51   Kappa Lambda Ratio 2.08 (H) 0.26 - 1.65  Final 05/21/2018  3:10 PM          05/23/18   TEST(S):   Unilateral Bone Marrow Biopsy/Aspiration     FINAL DIAGNOSIS:   Bone marrow, posterior iliac crest, right decalcified trephine biopsy and   touch imprint; right particle crush,   direct aspirate smear, and concentrated aspirate smear; and peripheral   blood smear:     - Normocellular marrow (cellularity estimated at  30-40%) with trilineage    hematopoiesis, no increase in blasts     - Approximately 2-3% Kappa predominant plasma cells (see comment)     - Peripheral blood showing slight normochromic, normocytic anemia;     COMMENT:   The findings support plasma cell dyscrasia which, based on available data   (see summary below in clinical history section) would be most consistent with monoclonal gammopathy of uncertain significance.   Final classification requires correlation with all clinical and imaging   findings. Concurrent flow cytometry ([id number]) revealed Kappa monotypic plasma cells. Please correlate also with   results of other ancillary studies    Results for orders placed or performed in visit on 05/21/18   X-ray Metastatic bone survey    Narrative    XR BONE SURVEY LIMITED  5/21/2018 4:05 PM      HISTORY: monoclonal protein- rule out skeletal lesions; Monoclonal  paraproteinemia    COMPARISON: None    FINDINGS:     Normal alignment of the cervical, lumbar, and thoracic spines. Marked  disc height loss of the cervical spine from C2 to C7. No sclerotic or  lytic lesions noted in the thoracic or lumbar spine.     No lesions in the pelvis. Normal appearance of the hips without  degenerative changes. Femurs are unremarkable.     Marked joint space narrowing in the medial tibiofemoral compartment of  the left knee with small 3 mm osteophyte off the medial tibial  plateau. No concerning lesions in the knees. Normal-appearing tibias,  fibulas, and ankles.    There is a 5 mm smooth bony projection off the mid lateral right  humerus, benign exostosis. Normal appearance of the forearms and  wrists.    The bony calvarium appears unremarkable.      Impression    IMPRESSION:   1. No radiolucent bony lesions to suggest multiple myeloma lesions.  2. Severe disc height loss in the cervical spine from C2 to C7.  3. Exostosis at the mid right humerus.    I have personally reviewed the examination and initial interpretation  and I agree  with the findings.    SIXTO BARRY MD         ASSESSMENT AND PLAN     73year-old male with workup of fatigue and mild anemia of inflammation in April 2018  was noted to have a monoclonal IgG Kappa paraprotein.     Monoclonal paraproteinemia    Patient was informed that his workup negative for evidence of end organ damage( CRAB criteria) with bone marrow biopsy showing 2-3% plasma cells  Clinical impression consistent with monoclonal gammopathy of uncertain clinical significance of  IgG Kappa type   Had a discussion with the patient today about the prognosis associated with MGUS.   Low Intermediate risk given abnormal free light chain ratio with a 21 % risk of progression to multiple myeloma over 20 years.  We'll repeat evaluation at 6 months with CBC with differential, chemistry panel, SPEP, UPEP, and I chain ratio    - Anemia of inflammation vs iron deficiency  Currently on oral iron supplementation daily  Repeat CBC in 6 months with iron studies        RTC in 6 months for follow-up with labs    Chart documentation with Dragon Voice recognition Software. Although reviewed after completion, some words and grammatical errors may remain.  Rj Leary MD  Attending Physician   Hematology/Medical Oncology

## 2018-06-11 NOTE — NURSING NOTE
"Oncology Rooming Note    June 11, 2018 9:54 AM   Jaron Carrera is a 73 year old male who presents for:    Chief Complaint   Patient presents with     Oncology Clinic Visit     3 week f/u with BMBX and bone survey results     Initial Vitals: /71 (BP Location: Left arm)  Pulse 98  Temp 97.7  F (36.5  C) (Oral)  Resp 18  Wt 81.7 kg (180 lb 1.6 oz)  SpO2 98%  BMI 25.3 kg/m2 Estimated body mass index is 25.3 kg/(m^2) as calculated from the following:    Height as of 5/21/18: 1.797 m (5' 10.75\").    Weight as of this encounter: 81.7 kg (180 lb 1.6 oz). Body surface area is 2.02 meters squared.  No Pain (0) Comment: Data Unavailable   No LMP for male patient.  Allergies reviewed: Yes  Medications reviewed: Yes    Medications: Medication refills not needed today.  Pharmacy name entered into [facility]: [id number] - [place], [place] - [address]        5 minutes for nursing intake (face to face time)     Macy Velázquez RN              "

## 2018-06-11 NOTE — MR AVS SNAPSHOT
After Visit Summary   6/11/2018    Jaron Carrera    MRN: [id number]           Patient Information     Date Of Birth          1945        Visit Information        Provider Department      6/11/2018 9:45 AM Rj Leary MD [facility]         Follow-ups after your visit        Your next 10 appointments already scheduled     Dec 17, 2018 11:45 AM CST   Return Visit with Rj Leary MD   [facility] ([facility])    [address] [place] [address] [phone]              Who to contact     If you have questions or need follow up information about today's clinic visit or your schedule please contact [facility] directly at [phone].  Normal or non-critical lab and imaging results will be communicated to you by MyChart, letter or phone within 4 business days after the clinic has received the results. If you do not hear from us within 7 days, please contact the clinic through MyChart or phone. If you have a critical or abnormal lab result, we will notify you by phone as soon as possible.  Submit refill requests through [employer] or call your pharmacy and they will forward the refill request to us. Please allow 3 business days for your refill to be completed.          Additional Information About Your Visit        [employer] Information     [employer] gives you secure access to your electronic health record. If you see a primary care provider, you can also send messages to your care team and make appointments. If you have questions, please call your primary care clinic.  If you do not have a primary care provider, please call [phone] and they will assist you.      [employer] is an electronic gateway that provides easy, online access to your medical records. With [employer], you can request a clinic appointment, read your test results, renew a prescription or communicate with your care team.     To access your  existing account, please contact your [facility] or call [phone] for assistance.        Care EveryWhere ID     This is your Care EveryWhere ID. This could be used by other organizations to access your [place] medical records  [id number]        Your Vitals Were     Pulse Temperature Respirations Pulse Oximetry BMI (Body Mass Index)       98 97.7  F (36.5  C) (Oral) 18 98% 25.3 kg/m2        Blood Pressure from Last 3 Encounters:   06/11/18 117/71   05/22/18 139/77   05/21/18 114/69    Weight from Last 3 Encounters:   06/11/18 81.7 kg (180 lb 1.6 oz)   05/22/18 82.6 kg (182 lb 3.2 oz)   05/21/18 79.4 kg (175 lb)              Today, you had the following     No orders found for display       Primary Care Provider Office Phone # Fax #    Cirilo Katelynn Spain PA-C [phone]       [address]  [place] [address]        Equal Access to Services     [facility]: Hadii aad ku hadasho Soomaali, waaxda luqadaha, qaybta kaalmada adeegyada, waxay idiin hayfranklynn gerri cabrera . So [facility] [place] [phone].    ATENCIÓN: Si habla español, tiene a francois disposición servicios gratuitos de asistencia lingüística. Llame al [phone].    We comply with applicable federal civil rights laws and [place] laws. We do not discriminate on the basis of race, color, national origin, age, disability, sex, sexual orientation, or gender identity.            Thank you!     Thank you for choosing [facility]  for your care. Our goal is always to provide you with excellent care. Hearing back from our patients is one way we can continue to improve our services. Please take a few minutes to complete the written survey that you may receive in the mail after your visit with us. Thank you!             Your Updated Medication List - Protect others around you: Learn how to safely use, store and throw away your medicines at www.disposemymeds.org.          This list is accurate as of  6/11/18 10:16 AM.  Always use your most recent med list.                   Brand Name Dispense Instructions for use Diagnosis    aspirin 81 MG tablet      1 TABLET DAILY        IRON SUPPLEMENT PO      Take 45 mg by mouth        metoprolol succinate 25 MG 24 hr tablet    TOPROL-XL    90 tablet    Take 1 tablet (25 mg) by mouth daily    Aortic root enlargement (H)       tamsulosin 0.4 MG capsule    FLOMAX    30 capsule    Take 1 capsule (0.4 mg) by mouth daily    Urinary hesitancy       valACYclovir 1000 mg tablet    VALTREX    21 tablet    Take 1 tablet (1,000 mg) by mouth 3 times daily    Neuropathy       VITAMIN B-12 PO      daily        VITAMIN D PO      daily

## 2018-06-13 LAB
COPATH REPORT: NORMAL
COPATH REPORT: NORMAL

## 2018-11-16 ENCOUNTER — APPOINTMENT (OUTPATIENT)
Dept: GENERAL RADIOLOGY | Facility: CLINIC | Age: 73
End: 2018-11-16
Attending: EMERGENCY MEDICINE
Payer: COMMERCIAL

## 2018-11-16 ENCOUNTER — APPOINTMENT (OUTPATIENT)
Dept: CT IMAGING | Facility: CLINIC | Age: 73
End: 2018-11-16
Attending: EMERGENCY MEDICINE
Payer: COMMERCIAL

## 2018-11-16 ENCOUNTER — HOSPITAL ENCOUNTER (OUTPATIENT)
Facility: CLINIC | Age: 73
Setting detail: OBSERVATION
Discharge: HOME | End: 2018-11-17
Attending: EMERGENCY MEDICINE | Admitting: INTERNAL MEDICINE
Payer: COMMERCIAL

## 2018-11-16 DIAGNOSIS — S40.022A ARM CONTUSION, LEFT, INITIAL ENCOUNTER: ICD-10-CM

## 2018-11-16 DIAGNOSIS — S80.12XA CONTUSION OF LEFT LOWER EXTREMITY, INITIAL ENCOUNTER: ICD-10-CM

## 2018-11-16 DIAGNOSIS — S20.222A BACK CONTUSION, LEFT, INITIAL ENCOUNTER: ICD-10-CM

## 2018-11-16 DIAGNOSIS — R10.9 ABDOMINAL PAIN, UNSPECIFIED ABDOMINAL LOCATION: ICD-10-CM
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
## 2018-11-16 DIAGNOSIS — S27.0XXA PNEUMOTHORAX, CLOSED, TRAUMATIC, INITIAL ENCOUNTER: ICD-10-CM
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
## 2018-11-16 LAB
ALBUMIN UR-MCNC: NEGATIVE MG/DL
ANION GAP SERPL CALCULATED.3IONS-SCNC: 5 MMOL/L (ref 3–14)
APPEARANCE UR: CLEAR
BASOPHILS # BLD AUTO: 0 10E9/L (ref 0–0.2)
BASOPHILS NFR BLD AUTO: 0.2 %
BILIRUB UR QL STRIP: NEGATIVE
BUN SERPL-MCNC: 28 MG/DL (ref 7–30)
CALCIUM SERPL-MCNC: 8.5 MG/DL (ref 8.5–10.1)
CHLORIDE SERPL-SCNC: 105 MMOL/L (ref 94–109)
CO2 SERPL-SCNC: 28 MMOL/L (ref 20–32)
COLOR UR AUTO: YELLOW
CREAT SERPL-MCNC: 1.07 MG/DL (ref 0.66–1.25)
DIFFERENTIAL METHOD BLD: ABNORMAL
EOSINOPHIL # BLD AUTO: 0 10E9/L (ref 0–0.7)
EOSINOPHIL NFR BLD AUTO: 0.3 %
ERYTHROCYTE [DISTWIDTH] IN BLOOD BY AUTOMATED COUNT: 13.2 % (ref 10–15)
GFR SERPL CREATININE-BSD FRML MDRD: 68 ML/MIN/1.7M2
GLUCOSE SERPL-MCNC: 109 MG/DL (ref 70–99)
GLUCOSE UR STRIP-MCNC: NEGATIVE MG/DL
HCT VFR BLD AUTO: 44.2 % (ref 40–53)
HGB BLD-MCNC: 14.9 G/DL (ref 13.3–17.7)
HGB UR QL STRIP: ABNORMAL
IMM GRANULOCYTES # BLD: 0.1 10E9/L (ref 0–0.4)
IMM GRANULOCYTES NFR BLD: 0.8 %
KETONES UR STRIP-MCNC: NEGATIVE MG/DL
LEUKOCYTE ESTERASE UR QL STRIP: NEGATIVE
LYMPHOCYTES # BLD AUTO: 0.8 10E9/L (ref 0.8–5.3)
LYMPHOCYTES NFR BLD AUTO: 7.9 %
MCH RBC QN AUTO: 30.4 PG (ref 26.5–33)
MCHC RBC AUTO-ENTMCNC: 33.7 G/DL (ref 31.5–36.5)
MCV RBC AUTO: 90 FL (ref 78–100)
MONOCYTES # BLD AUTO: 0.6 10E9/L (ref 0–1.3)
MONOCYTES NFR BLD AUTO: 5.4 %
MUCOUS THREADS #/AREA URNS LPF: PRESENT /LPF
NEUTROPHILS # BLD AUTO: 9.1 10E9/L (ref 1.6–8.3)
NEUTROPHILS NFR BLD AUTO: 85.4 %
NITRATE UR QL: NEGATIVE
NRBC # BLD AUTO: 0 10*3/UL
NRBC BLD AUTO-RTO: 0 /100
PH UR STRIP: 5 PH (ref 5–7)
PLATELET # BLD AUTO: 160 10E9/L (ref 150–450)
POTASSIUM SERPL-SCNC: 3.8 MMOL/L (ref 3.4–5.3)
RADIOLOGIST FLAGS: ABNORMAL
RBC # BLD AUTO: 4.9 10E12/L (ref 4.4–5.9)
RBC #/AREA URNS AUTO: 9 /HPF (ref 0–2)
SODIUM SERPL-SCNC: 138 MMOL/L (ref 133–144)
SOURCE: ABNORMAL
SP GR UR STRIP: 1.03 (ref 1–1.03)
UROBILINOGEN UR STRIP-MCNC: 2 MG/DL (ref 0–2)
WBC # BLD AUTO: 10.6 10E9/L (ref 4–11)
WBC #/AREA URNS AUTO: <1 /HPF (ref 0–5)

## 2018-11-16 PROCEDURE — 73060 X-RAY EXAM OF HUMERUS: CPT | Mod: LT

## 2018-11-16 PROCEDURE — 73552 X-RAY EXAM OF FEMUR 2/>: CPT | Mod: LT

## 2018-11-16 PROCEDURE — A9270 NON-COVERED ITEM OR SERVICE: HCPCS | Mod: GY | Performed by: PHYSICIAN ASSISTANT

## 2018-11-16 PROCEDURE — 25000132 ZZH RX MED GY IP 250 OP 250 PS 637: Performed by: PHYSICIAN ASSISTANT

## 2018-11-16 PROCEDURE — 96374 THER/PROPH/DIAG INJ IV PUSH: CPT | Mod: 59 | Performed by: EMERGENCY MEDICINE

## 2018-11-16 PROCEDURE — 25000128 H RX IP 250 OP 636: Performed by: EMERGENCY MEDICINE

## 2018-11-16 PROCEDURE — 99285 EMERGENCY DEPT VISIT HI MDM: CPT | Mod: Z6 | Performed by: EMERGENCY MEDICINE

## 2018-11-16 PROCEDURE — 25000125 ZZHC RX 250: Performed by: EMERGENCY MEDICINE

## 2018-11-16 PROCEDURE — 85025 COMPLETE CBC W/AUTO DIFF WBC: CPT | Performed by: EMERGENCY MEDICINE

## 2018-11-16 PROCEDURE — G0378 HOSPITAL OBSERVATION PER HR: HCPCS

## 2018-11-16 PROCEDURE — 99285 EMERGENCY DEPT VISIT HI MDM: CPT | Mod: 25 | Performed by: EMERGENCY MEDICINE

## 2018-11-16 PROCEDURE — 81003 URINALYSIS AUTO W/O SCOPE: CPT | Performed by: EMERGENCY MEDICINE

## 2018-11-16 PROCEDURE — 71045 X-RAY EXAM CHEST 1 VIEW: CPT

## 2018-11-16 PROCEDURE — 80048 BASIC METABOLIC PNL TOTAL CA: CPT | Performed by: EMERGENCY MEDICINE

## 2018-11-16 PROCEDURE — 71260 CT THORAX DX C+: CPT

## 2018-11-16 RX ORDER — OXYCODONE AND ACETAMINOPHEN 5; 325 MG/1; MG/1
1-2 TABLET ORAL EVERY 4 HOURS PRN
Status: CANCELLED | OUTPATIENT
Start: 2018-11-16

## 2018-11-16 RX ORDER — ASPIRIN 81 MG/1
81 TABLET, CHEWABLE ORAL DAILY
Status: DISCONTINUED | OUTPATIENT
Start: 2018-11-17 | End: 2018-11-17 | Stop reason: HOSPADM

## 2018-11-16 RX ORDER — HYDROMORPHONE HYDROCHLORIDE 1 MG/ML
0.3 INJECTION, SOLUTION INTRAMUSCULAR; INTRAVENOUS; SUBCUTANEOUS
Status: DISCONTINUED | OUTPATIENT
Start: 2018-11-16 | End: 2018-11-16

## 2018-11-16 RX ORDER — NALOXONE HYDROCHLORIDE 0.4 MG/ML
.1-.4 INJECTION, SOLUTION INTRAMUSCULAR; INTRAVENOUS; SUBCUTANEOUS
Status: DISCONTINUED | OUTPATIENT
Start: 2018-11-16 | End: 2018-11-17 | Stop reason: HOSPADM

## 2018-11-16 RX ORDER — ONDANSETRON 2 MG/ML
4 INJECTION INTRAMUSCULAR; INTRAVENOUS EVERY 6 HOURS PRN
Status: DISCONTINUED | OUTPATIENT
Start: 2018-11-16 | End: 2018-11-17 | Stop reason: HOSPADM

## 2018-11-16 RX ORDER — IOPAMIDOL 755 MG/ML
85 INJECTION, SOLUTION INTRAVASCULAR ONCE
Status: COMPLETED | OUTPATIENT
Start: 2018-11-16 | End: 2018-11-16

## 2018-11-16 RX ORDER — ACETAMINOPHEN 500 MG
1000 TABLET ORAL 3 TIMES DAILY
Status: DISCONTINUED | OUTPATIENT
Start: 2018-11-16 | End: 2018-11-17 | Stop reason: HOSPADM

## 2018-11-16 RX ORDER — SODIUM CHLORIDE 9 MG/ML
INJECTION, SOLUTION INTRAVENOUS CONTINUOUS
Status: CANCELLED | OUTPATIENT
Start: 2018-11-16

## 2018-11-16 RX ORDER — ONDANSETRON 4 MG/1
4 TABLET, ORALLY DISINTEGRATING ORAL EVERY 6 HOURS PRN
Status: DISCONTINUED | OUTPATIENT
Start: 2018-11-16 | End: 2018-11-17 | Stop reason: HOSPADM

## 2018-11-16 RX ORDER — HYDROMORPHONE HYDROCHLORIDE 1 MG/ML
.3-.5 INJECTION, SOLUTION INTRAMUSCULAR; INTRAVENOUS; SUBCUTANEOUS
Status: DISCONTINUED | OUTPATIENT
Start: 2018-11-16 | End: 2018-11-17 | Stop reason: HOSPADM

## 2018-11-16 RX ADMIN — SODIUM CHLORIDE 61 ML: 9 INJECTION, SOLUTION INTRAVENOUS at 14:22

## 2018-11-16 RX ADMIN — IOPAMIDOL 85 ML: 755 INJECTION, SOLUTION INTRAVENOUS at 14:22

## 2018-11-16 RX ADMIN — ACETAMINOPHEN 1000 MG: 500 TABLET ORAL at 21:23

## 2018-11-16 RX ADMIN — Medication 0.3 MG: at 17:42

## 2018-11-16 ASSESSMENT — ACTIVITIES OF DAILY LIVING (ADL)
RETIRED_COMMUNICATION: 0-->UNDERSTANDS/COMMUNICATES WITHOUT DIFFICULTY
DRESS: 0-->INDEPENDENT
EATING: 0 - INDEPENDENT
BATHING: 0 - INDEPENDENT
AMBULATION: 0 - INDEPENDENT
SWALLOWING: 0-->SWALLOWS FOODS/LIQUIDS WITHOUT DIFFICULTY
TOILETING: 0 - INDEPENDENT
TOILETING: 0-->INDEPENDENT
NUMBER_OF_TIMES_PATIENT_HAS_FALLEN_WITHIN_LAST_SIX_MONTHS: 1
COMMUNICATION: 0 - UNDERSTANDS/COMMUNICATES WITHOUT DIFFICULTY
SWALLOWING: 0 - SWALLOWS FOODS/LIQUIDS WITHOUT DIFFICULTY
DRESS: 0 - INDEPENDENT
RETIRED_EATING: 0-->INDEPENDENT
FALL_HISTORY_WITHIN_LAST_SIX_MONTHS: YES
BATHING: 0-->INDEPENDENT
CHANGE_IN_FUNCTIONAL_STATUS_SINCE_ONSET_OF_CURRENT_ILLNESS/INJURY: NO
TRANSFERRING: 0 - INDEPENDENT
COGNITION: 0 - NO COGNITION ISSUES REPORTED

## 2018-11-16 NOTE — ED NOTES
About 2-3 hours ago pt fell off 8 foot roof and landed on his left side.  Pt denies hitting head.  No LOC. Denies head, neck, chest, or back pain.  Pt able to get up on his own.  Pt c/o pain in lt shoulder, lt flank area, and side of lt thigh.  Abrasion to lt forearm, hematoma to lt flank and lt thigh area.  No numbness/tingling in extremities.  Pt able to move all extremities but limited range of motion in lt shoulder due to pain.

## 2018-11-16 NOTE — IP AVS SNAPSHOT
[facility]    [address] [address]    Phone:  [phone]    Fax:  [phone]                                       After Visit Summary   11/16/2018    Jaron Carrera    MRN: [id number]           After Visit Summary Signature Page     I have received my discharge instructions, and my questions have been answered. I have discussed any challenges I see with this plan with the nurse or doctor.    ..........................................................................................................................................  Patient/Patient Representative Signature      ..........................................................................................................................................  Patient Representative Print Name and Relationship to Patient    ..................................................               ................................................  Date                                   Time    ..........................................................................................................................................  Reviewed by Signature/Title    ...................................................              ..............................................  Date                                               Time          22EPIC Rev 08/18

## 2018-11-16 NOTE — IP AVS SNAPSHOT
MRN:[id number]                      After Visit Summary   11/16/2018    Jaron Carrera    MRN: [id number]           Thank you!     Thank you for choosing [facility] for your care. Our goal is always to provide you with excellent care. Hearing back from our patients is one way we can continue to improve our services. Please take a few minutes to complete the written survey that you may receive in the mail after you visit with us. Thank you!        Patient Information     Date Of Birth          1945        About your hospital stay     You were admitted on:  November 16, 2018 You last received care in the:  [facility]    You were discharged on:  November 17, 2018       Who to Call     For medical emergencies, please call 911.  For non-urgent questions about your medical care, please call your primary care provider or clinic, [phone]          Attending Provider     Provider Specialty    Fabián Nieto MD Emergency Medicine    [facility], Britton Jerry MD Internal Medicine    Sabina Swann MD Internal Medicine    Tamanna, Dom Medel MD Family Practice       Primary Care Provider Office Phone # Fax #    Cirilo Spain PA-C [phone]      Your next 10 appointments already scheduled     Nov 21, 2018 10:20 AM CST   Office Visit with Cirilo Spain PA-C   [facility] ([facility])    [address] [place] [address] [phone]           Bring a current list of meds and any records pertaining to this visit. For Physicals, please bring immunization records and any forms needing to be filled out. Please arrive 10 minutes early to complete paperwork.            Dec 17, 2018 11:45 AM CST   Return Visit with Rj Leary MD   [facility] ([facility])    [id number] [place] [address] [phone]              Pending Results     No orders found for last 3  day(s).            Statement of Approval     Ordered          11/17/18 1109  I have reviewed and agree with all the recommendations and orders detailed in this document.  EFFECTIVE NOW     Approved and electronically signed by:  Dom Nolen MD           11/17/18 1046  I have reviewed and agree with all the recommendations and orders detailed in this document.  EFFECTIVE NOW     Approved and electronically signed by:  Dom Nolen MD             Admission Information     Date & Time Provider Department Dept. Phone    11/16/2018 Dom Nolen MD [facility] Surgical [phone]      Your Vitals Were     Blood Pressure Pulse Temperature Respirations Height Weight    114/64 (BP Location: Right arm) 81 98.3  F (36.8  C) (Oral) 18 1.829 m (6') 82.8 kg (182 lb 8.7 oz)    Pulse Oximetry BMI (Body Mass Index)                96% 24.76 kg/m2          MyChart Information     [employer] gives you secure access to your electronic health record. If you see a primary care provider, you can also send messages to your care team and make appointments. If you have questions, please call your primary care clinic.  If you do not have a primary care provider, please call [phone] and they will assist you.        Care EveryWhere ID     This is your Care EveryWhere ID. This could be used by other organizations to access your [place] medical records  [id number]        Equal Access to Services     KIMBERLY TRIMBLE : Hadallyson Treviño, waaxda luqadaha, qaybta kaalmaadina peña. So [facility] [phone].    ATENCIÓN: Si habla español, tiene a francois disposición servicios gratuitos de asistencia lingüística. Llame al [phone].    We comply with applicable federal civil rights laws and [place] laws. We do not discriminate on the basis of race, color, national origin, age, disability, sex, sexual orientation, or gender identity.               Review of  your medicines      CONTINUE these medicines which may have CHANGED, or have new prescriptions. If we are uncertain of the size of tablets/capsules you have at home, strength may be listed as something that might have changed.        Dose / Directions    metoprolol succinate 25 MG 24 hr tablet   Commonly known as:  TOPROL-XL   This may have changed:  how much to take   Used for:  Aortic root enlargement (H)        Dose:  25 mg   Take 1 tablet (25 mg) by mouth daily   Quantity:  90 tablet   Refills:  1         CONTINUE these medicines which have NOT CHANGED        Dose / Directions    aspirin 81 MG tablet        1 TABLET DAILY   Refills:  0       IRON SUPPLEMENT PO        Dose:  45 mg   Take 45 mg by mouth twice a week   Refills:  0       VITAMIN B-12 PO        Take one tablet every other day   Refills:  0       VITAMIN D PO        Take one tablet by mouth daily   Refills:  0                Protect others around you: Learn how to safely use, store and throw away your medicines at www.disposemymeds.org.             Medication List: This is a list of all your medications and when to take them. Check marks below indicate your daily home schedule. Keep this list as a reference.      Medications           Morning Afternoon Evening Bedtime As Needed    aspirin 81 MG tablet   1 TABLET DAILY                                IRON SUPPLEMENT PO   Take 45 mg by mouth twice a week                                metoprolol succinate 25 MG 24 hr tablet   Commonly known as:  TOPROL-XL   Take 1 tablet (25 mg) by mouth daily                                VITAMIN B-12 PO   Take one tablet every other day                                VITAMIN D PO   Take one tablet by mouth daily

## 2018-11-16 NOTE — ED NOTES
Patient has  [place] to Observation  order. Patient has been given Observation brochure  What does Observation mean to me .  Patient has been given the opportunity to ask questions about observation status and their plan of care.  Eden Bronson RN

## 2018-11-17 ENCOUNTER — APPOINTMENT (OUTPATIENT)
Dept: GENERAL RADIOLOGY | Facility: CLINIC | Age: 73
End: 2018-11-17
Attending: EMERGENCY MEDICINE
Payer: COMMERCIAL

## 2018-11-17 VITALS
HEIGHT: 72 IN | BODY MASS INDEX: 24.72 KG/M2 | RESPIRATION RATE: 18 BRPM | SYSTOLIC BLOOD PRESSURE: 114 MMHG | WEIGHT: 182.54 LBS | DIASTOLIC BLOOD PRESSURE: 64 MMHG | HEART RATE: 81 BPM | OXYGEN SATURATION: 96 % | TEMPERATURE: 98.3 F

## 2018-11-17 PROCEDURE — 71046 X-RAY EXAM CHEST 2 VIEWS: CPT

## 2018-11-17 PROCEDURE — A9270 NON-COVERED ITEM OR SERVICE: HCPCS | Mod: GY | Performed by: PHYSICIAN ASSISTANT

## 2018-11-17 PROCEDURE — 25000132 ZZH RX MED GY IP 250 OP 250 PS 637: Performed by: PHYSICIAN ASSISTANT

## 2018-11-17 PROCEDURE — G0378 HOSPITAL OBSERVATION PER HR: HCPCS

## 2018-11-17 PROCEDURE — 99236 HOSP IP/OBS SAME DATE HI 85: CPT | Performed by: FAMILY MEDICINE

## 2018-11-17 RX ADMIN — ACETAMINOPHEN 1000 MG: 500 TABLET ORAL at 08:02

## 2018-11-17 NOTE — PROGRESS NOTES
Patient seen briefly in his room today. I reviewed his chest x-ray from yesterday and today. There is no interval change in the size of the pneumothorax on the left side. He is being discharged today. I spent a few minutes discussing the diagnosis and what I anticipate to happen.    He has plans to see his primary care provider and obtain a follow-up chest x-ray, which is exactly what I would recommend.    Nothing more from a surgical standpoint.    Fabián Mccord MD FACS

## 2018-11-17 NOTE — H&P
Admitted:     2018      CHIEF COMPLAINT:  Left-sided pain since fall from the roof.      HISTORY OF PRESENT ILLNESS:  Jaron Carrera is a 73-year-old male with a history of mild hypertension, some rotator cuff problems and BPH.  He was on his roof.  I believe he was standing on a ladder next to the eave when he fell off and landed on his left side.  He did not hit or hurt his head or neck.  He had pain in his left lateral upper leg, left flank and some pain around his left shoulder and humerus.  He had some increase in flank pain with deep breathing.      He came to the emergency room and was worked up with x-rays of his humerus and femur on the left side, as well as with a chest and abdomen CT.  He was found to have a left apical pneumothorax that was small.  He was admitted under observation.      I saw him the next morning.  His pain was controlled just with plain Tylenol.  He had pain around his left shoulder, in the left lower posterior chest and a little bit of discomfort in his left lateral upper thigh.  He was up and ambulatory.  He really did not have shortness of breath.  He did not need oxygen.  He noted pain and difficulty abducting his left arm.  A repeat chest x-ray showed no change in the small apical pneumothorax.      PAST MEDICAL HISTORY:   1.  MGUS.   2.  BPH.   3.  Vitamin D deficiency.   4.  B12 deficiency.   5.  Mild hypertension.      PAST SURGICAL HISTORY:  Varicose vein stripping, left knee surgery.      FAMILY MEDICAL HISTORY:  Father with COPD.  Mother's medical history is unclear.  She is .  Has a brother with hyperlipidemia and prostate cancer and a daughter with thyroid disease.      ALLERGIES:  PENICILLINS CAUSE HIVES.        SOCIAL HISTORY:  He lives locally with his wife.      HABITS:  He does not smoke.  Occasional alcohol.      REVIEW OF SYSTEMS:  No fever or chills.  No headache.  No neck pain.  He has some pain in his left lower posterior chest.  No shortness of  breath, no abdominal pain.  He has pain in his left lateral thigh and around his humerus and left shoulder.  No neurological complaints.  No urinary tract complaints.  No abdominal complaints.  No change in bowel habits.  No skin symptoms.  Rest of 10-point review of systems is negative.      PHYSICAL EXAMINATION:   GENERAL:  He is alert, nondistressed.   VITAL SIGNS:  Temperature 98.3, heart rate 91, blood pressure 114/64, respiratory rate 18, sat 96%.   HEENT:  Ears negative.  Oral negative.   NECK:  Supple.  There is normal range of motion.  It is nontender.   LUNGS:  Clear.  Chest wall has  some vague, but not point tenderness, in the left lower posterior rib cage.   COR:  S1, S2, without click, rub or murmur.   ABDOMEN:  Soft, benign, nontender, without guarding, rebound, rigidity or mass.   GENITALIA:  Grossly normal.   EXTREMITIES:  He has some soft tissue fullness and some abrasion on the left lateral thigh.  In the left upper arm, he has some soft tissue fullness around the shoulder joint, stretching down onto the lateral part of the humerus area with abrasions overlying the upper forearm.  I can passively abduct his arm, but he cannot actively abduct much due to pain.   NEUROLOGIC:  Cranial nerves, motor, sensory, reflexes all within normal limits.      LABORATORY DATA:  UA showed 9 red cells per high-power field.  BMP:  Glucose was 109, otherwise normal.  CBC shows white count 10,600, hemoglobin 14.9.      IMAGING:  As above.      ASSESSMENT:   1.  Fall from roof.   2.  Left apical pneumothorax.   3.  Soft tissue swelling and ecchymosis of the left upper arm.   4.  Cannot rule out rotator cuff injury, left shoulder.   5.  Chest wall trauma to left posterior lower rib cage.   6.  Soft tissue injury to left upper thigh.      PLAN:  The pneumothorax is not expanding.  He can discharge to home.  He is going to use just Tylenol for pain.  His has followup this next week with his primary doctor.  I think if his  left arm is not improving, he might need a shoulder MRI to rule out a more significant acute rotator cuff problem.  If he has any problems, he should return.         RABIA DE LA ROSA MD             D: 2018   T: 2018   MT: NAHUN      Name:     ELLEN LANCASTER   MRN:      [id number]        Account:      [id number]   :      1945        Admitted:     2018                   Document: [id number]

## 2018-11-17 NOTE — PROGRESS NOTES
[facility] DISCHARGE NOTE    Patient discharged to home at 11:17 AM via wheel chair. Accompanied by spouse and staff. Discharge instructions reviewed with patient and spouse, opportunity offered to ask questions. Prescriptions - None ordered for discharge. All belongings sent with patient.    Jayashree Bae RN

## 2018-11-17 NOTE — DISCHARGE SUMMARY
See the H and P from today for the discharge summary.  Pt was seen once for a combined H and P and discharge summary.

## 2018-11-17 NOTE — PROGRESS NOTES
[facility] [facility] [place]    Patient admitted to room 2300 at approximately 1900 via wheel chair from emergency room. Patient was accompanied by transport tech.     Verbal SBAR report received from Meg DORAN prior to patient arrival.     Patient ambulated to bed independently. Patient alert and oriented X 3. Pain is controlled without any medications. 0-10 Pain Scale: 5. Admission vital signs: Blood pressure 135/74, pulse 99, temperature 97.9  F (36.6  C), temperature source Oral, resp. rate 18, height 1.829 m (6'), weight 82.8 kg (182 lb 8.7 oz), SpO2 96 %. Patient was oriented to plan of care, call light, bed controls, tv, telephone, bathroom and visiting hours.     Risk Assessment    The following safety risks were identified during admission: fall. Yellow risk band applied: YES.     Skin Initial Assessment    This writer admitted this patient and completed a full skin assessment and Héctor score in the Adult PCS flowsheet. Appropriate interventions initiated as needed.     Secondary skin check completed by Tracey DORAN.         Héctor Risk Assessment  Sensory Perception: 4-->no impairment  Moisture: 4-->rarely moist  Activity: 3-->walks occasionally  Mobility: 3-->slightly limited  Nutrition: 4-->excellent  Friction and Shear: 3-->no apparent problem  Héctor Score: 21    Elle Sotelo

## 2018-11-17 NOTE — PLAN OF CARE
Problem: Patient Care Overview  Goal: Plan of Care/Patient Progress Review  Outcome: No Change  Patient states moderate pain continues over left scapula area. Denies feeling SOA, lightheaded, off balance when ambulating to bathroom. Instructed to call when he gets up. Using ice to left shoulder and left thigh area. He is hoping to go home tomorrow.

## 2018-11-17 NOTE — ED NOTES
"Pt states he felt \"a little dizzy and sweaty after pain med\" but didn't do much for the pain.  Pt states he is feeling ok and does not need anything more for pain.   "

## 2018-11-17 NOTE — PROGRESS NOTES
Skin affirmation note    Admitting nurse completed full skin assessment, Héctor score and Héctor interventions. This writer agrees with the initial skin assessment findings.

## 2018-11-19 ASSESSMENT — ENCOUNTER SYMPTOMS
WOUND: 1
FLANK PAIN: 1
WEAKNESS: 0
ARTHRALGIAS: 1
NAUSEA: 0
TROUBLE SWALLOWING: 0
LIGHT-HEADEDNESS: 0
CHEST TIGHTNESS: 0
WHEEZING: 0
SHORTNESS OF BREATH: 1
MYALGIAS: 1
ACTIVITY CHANGE: 1
FEVER: 0
JOINT SWELLING: 0
CONFUSION: 0
BACK PAIN: 1
HEADACHES: 0
HEMATURIA: 0
NUMBNESS: 0
VOMITING: 0

## 2018-11-20 NOTE — PROGRESS NOTES
SUBJECTIVE:   Jaron Carrera is a 73 year old male who presents to clinic today for the following health issues:          Hospital Follow-up Visit:    Hospital/Nursing Home/IP Rehab Facility: [facility]  Date of Admission: 11/16/18  Date of Discharge: 11/18/18  Reason(s) for Admission: Pneumothorax--after fall            Problems taking medications regularly:  None       Medication changes since discharge: None       Problems adhering to non-medication therapy:  None  Patient denies sob. No profound pain. Some left shoulder pain.   Summary of hospitalization:  [facility] discharge summary reviewed  Diagnostic Tests/Treatments reviewed.  Follow up needed: none  Other Healthcare Providers Involved in Patient s Care:         None  Update since discharge: improved.     Post Discharge Medication Reconciliation: discharge medications reconciled, continue medications without change.  Plan of care communicated with patient     Coding guidelines for this visit:  Type of Medical   Decision Making Face-to-Face Visit       within 7 Days of discharge Face-to-Face Visit        within 14 days of discharge   Moderate Complexity 29535 [id number]   High Complexity 58212 01250                  Problem list and histories reviewed & adjusted, as indicated.  Additional history: as documented    BP Readings from Last 3 Encounters:   11/21/18 131/71   11/17/18 114/64   06/11/18 117/71    Wt Readings from Last 3 Encounters:   11/21/18 185 lb (83.9 kg)   11/16/18 182 lb 8.7 oz (82.8 kg)   06/11/18 180 lb 1.6 oz (81.7 kg)                    Reviewed and updated as needed this visit by clinical staff  Tobacco  Allergies       Reviewed and updated as needed this visit by Provider         All other systems negative except as outline above  OBJECTIVE:  CHEST:chest clear to IPPA, no tachypnea, retractions or cyanosis and S1, S2 normal, no murmur, no gallop, rate regular.  Shoulder exam shows positive impingement signs are  present with pain at high arc of abduction and forward flexion on left. There is tenderness of the shoulder in general. Weakness with attempts at abduction, forward flexion and ext rotation   Eye exam - right eye normal lid, conjunctiva, cornea, pupil and fundus, left eye normal lid, conjunctiva, cornea, pupil and fundus.  No edema  Jaron was seen today for hospital f/u.    Diagnoses and all orders for this visit:    Pneumothorax, closed, traumatic, initial encounter  -     XR Chest 2 Views; Future    MGUS (monoclonal gammopathy of unknown significance)  -     CBC with platelets differential  -     Basic metabolic panel  -     Protein electrophoresis  -     Kappa and lambda light chain  -     Protein electrophoresis timed urine  -     Protein immunofixation urine  -     Protein Immunofixation Serum    Normocytic anemia  -     Iron and iron binding capacity  -     Ferritin    Aortic root enlargement (H)  -     metoprolol succinate (TOPROL-XL) 25 MG 24 hr tablet; Take 0.5 tablets (12.5 mg) by mouth daily    Superior glenoid labrum lesion of left shoulder, subsequent encounter  -     MR Shoulder Left w/o Contrast; Future    Shoulder weakness  -     MR Shoulder Left w/o Contrast; Future      Advised supportive and symptomatic treatment.  Follow up with Provider - if condition persists or worsens.

## 2018-11-21 ENCOUNTER — OFFICE VISIT (OUTPATIENT)
Dept: FAMILY MEDICINE | Facility: CLINIC | Age: 73
End: 2018-11-21
Payer: COMMERCIAL

## 2018-11-21 ENCOUNTER — RADIANT APPOINTMENT (OUTPATIENT)
Dept: GENERAL RADIOLOGY | Facility: CLINIC | Age: 73
End: 2018-11-21
Attending: PHYSICIAN ASSISTANT
Payer: COMMERCIAL

## 2018-11-21 VITALS
HEART RATE: 82 BPM | SYSTOLIC BLOOD PRESSURE: 131 MMHG | WEIGHT: 185 LBS | RESPIRATION RATE: 16 BRPM | BODY MASS INDEX: 25.09 KG/M2 | DIASTOLIC BLOOD PRESSURE: 71 MMHG | TEMPERATURE: 96.8 F | OXYGEN SATURATION: 97 %

## 2018-11-21 DIAGNOSIS — S27.0XXA PNEUMOTHORAX, CLOSED, TRAUMATIC, INITIAL ENCOUNTER: ICD-10-CM

## 2018-11-21 DIAGNOSIS — I77.89 AORTIC ROOT ENLARGEMENT (H): ICD-10-CM

## 2018-11-21 DIAGNOSIS — D47.2 MGUS (MONOCLONAL GAMMOPATHY OF UNKNOWN SIGNIFICANCE): ICD-10-CM

## 2018-11-21 DIAGNOSIS — S27.0XXA PNEUMOTHORAX, CLOSED, TRAUMATIC, INITIAL ENCOUNTER: Primary | ICD-10-CM

## 2018-11-21 DIAGNOSIS — R29.898 SHOULDER WEAKNESS: ICD-10-CM

## 2018-11-21 DIAGNOSIS — S43.432D SUPERIOR GLENOID LABRUM LESION OF LEFT SHOULDER, SUBSEQUENT ENCOUNTER: ICD-10-CM

## 2018-11-21 DIAGNOSIS — D64.9 NORMOCYTIC ANEMIA: ICD-10-CM

## 2018-11-21 LAB
ANION GAP SERPL CALCULATED.3IONS-SCNC: 6 MMOL/L (ref 3–14)
BASOPHILS # BLD AUTO: 0 10E9/L (ref 0–0.2)
BASOPHILS NFR BLD AUTO: 0.3 %
BUN SERPL-MCNC: 18 MG/DL (ref 7–30)
CALCIUM SERPL-MCNC: 8.8 MG/DL (ref 8.5–10.1)
CHLORIDE SERPL-SCNC: 105 MMOL/L (ref 94–109)
CO2 SERPL-SCNC: 27 MMOL/L (ref 20–32)
CREAT SERPL-MCNC: 0.9 MG/DL (ref 0.66–1.25)
DIFFERENTIAL METHOD BLD: NORMAL
EOSINOPHIL # BLD AUTO: 0.1 10E9/L (ref 0–0.7)
EOSINOPHIL NFR BLD AUTO: 1.3 %
ERYTHROCYTE [DISTWIDTH] IN BLOOD BY AUTOMATED COUNT: 13.9 % (ref 10–15)
FERRITIN SERPL-MCNC: 119 NG/ML (ref 26–388)
GFR SERPL CREATININE-BSD FRML MDRD: 83 ML/MIN/1.7M2
GLUCOSE SERPL-MCNC: 93 MG/DL (ref 70–99)
HCT VFR BLD AUTO: 41.5 % (ref 40–53)
HGB BLD-MCNC: 13.9 G/DL (ref 13.3–17.7)
IRON SATN MFR SERPL: 20 % (ref 15–46)
IRON SERPL-MCNC: 66 UG/DL (ref 35–180)
LYMPHOCYTES # BLD AUTO: 1.1 10E9/L (ref 0.8–5.3)
LYMPHOCYTES NFR BLD AUTO: 17.4 %
MCH RBC QN AUTO: 30.4 PG (ref 26.5–33)
MCHC RBC AUTO-ENTMCNC: 33.5 G/DL (ref 31.5–36.5)
MCV RBC AUTO: 91 FL (ref 78–100)
MONOCYTES # BLD AUTO: 0.5 10E9/L (ref 0–1.3)
MONOCYTES NFR BLD AUTO: 7.9 %
NEUTROPHILS # BLD AUTO: 4.5 10E9/L (ref 1.6–8.3)
NEUTROPHILS NFR BLD AUTO: 73.1 %
PLATELET # BLD AUTO: 175 10E9/L (ref 150–450)
POTASSIUM SERPL-SCNC: 4.1 MMOL/L (ref 3.4–5.3)
RBC # BLD AUTO: 4.57 10E12/L (ref 4.4–5.9)
SODIUM SERPL-SCNC: 138 MMOL/L (ref 133–144)
TIBC SERPL-MCNC: 322 UG/DL (ref 240–430)
WBC # BLD AUTO: 6.2 10E9/L (ref 4–11)

## 2018-11-21 PROCEDURE — 83550 IRON BINDING TEST: CPT | Performed by: INTERNAL MEDICINE

## 2018-11-21 PROCEDURE — 99214 OFFICE O/P EST MOD 30 MIN: CPT | Performed by: PHYSICIAN ASSISTANT

## 2018-11-21 PROCEDURE — 82728 ASSAY OF FERRITIN: CPT | Performed by: INTERNAL MEDICINE

## 2018-11-21 PROCEDURE — 83883 ASSAY NEPHELOMETRY NOT SPEC: CPT | Mod: 59 | Performed by: INTERNAL MEDICINE

## 2018-11-21 PROCEDURE — 84165 PROTEIN E-PHORESIS SERUM: CPT | Performed by: INTERNAL MEDICINE

## 2018-11-21 PROCEDURE — 85025 COMPLETE CBC W/AUTO DIFF WBC: CPT | Performed by: INTERNAL MEDICINE

## 2018-11-21 PROCEDURE — 00000402 ZZHCL STATISTIC TOTAL PROTEIN: Performed by: INTERNAL MEDICINE

## 2018-11-21 PROCEDURE — 71046 X-RAY EXAM CHEST 2 VIEWS: CPT

## 2018-11-21 PROCEDURE — 80048 BASIC METABOLIC PNL TOTAL CA: CPT | Performed by: INTERNAL MEDICINE

## 2018-11-21 PROCEDURE — 83540 ASSAY OF IRON: CPT | Performed by: INTERNAL MEDICINE

## 2018-11-21 PROCEDURE — 83883 ASSAY NEPHELOMETRY NOT SPEC: CPT | Performed by: INTERNAL MEDICINE

## 2018-11-21 PROCEDURE — 86334 IMMUNOFIX E-PHORESIS SERUM: CPT | Performed by: INTERNAL MEDICINE

## 2018-11-21 PROCEDURE — 36415 COLL VENOUS BLD VENIPUNCTURE: CPT | Performed by: INTERNAL MEDICINE

## 2018-11-21 PROCEDURE — 82784 ASSAY IGA/IGD/IGG/IGM EACH: CPT | Performed by: INTERNAL MEDICINE

## 2018-11-21 RX ORDER — METOPROLOL SUCCINATE 25 MG/1
12.5 TABLET, EXTENDED RELEASE ORAL DAILY
Qty: 45 TABLET | Refills: 3 | Status: SHIPPED | OUTPATIENT
Start: 2018-11-21 | End: 2019-06-14 | Stop reason: SINTOL

## 2018-11-21 NOTE — MR AVS SNAPSHOT
After Visit Summary   11/21/2018    Jaron Carrera    MRN: [id number]           Patient Information     Date Of Birth          1945        Visit Information        Provider Department      11/21/2018 10:20 AM Cirilo Spain PA-C [facility]        Today's Diagnoses     Pneumothorax, closed, traumatic, initial encounter    -  1    MGUS (monoclonal gammopathy of unknown significance)        Normocytic anemia        Aortic root enlargement (H)        Superior glenoid labrum lesion of left shoulder, subsequent encounter        Shoulder weakness           Follow-ups after your visit        Your next 10 appointments already scheduled     Nov 28, 2018  8:45 AM CST   MR SHOULDER LEFT W/O CONTRAST with [id number]   [facility] ([facility])    [address] [facility] [address] [phone]           How do I prepare for my exam? (Food and drink instructions) **If you will be receiving sedation or general anesthesia, please see special notes below.**  How do I prepare for my exam? (Other instructions) Take your medicines as usual, unless your doctor tells you not to. Please remove any body piercings and hair extensions before you arrive. Follow your doctor s orders. If you do not, we may have to postpone your exam. You may or may not receive IV contrast for this exam pending the discretion of the Radiologist.  You do not need to do anything special to prepare. **If you will be receiving sedation or general anesthesia, please see special notes below.**  What should I wear:  The MRI machine uses a strong magnet. Please wear clothes without metal (snaps, zippers). A sweatsuit works well, or we may give you a hospital gown.  How long does the exam take: Most tests take 30 to 60 minutes.  HOWEVER, IF YOUR DOCTOR PRESCRIBES ANESTHESIA please plan on spending four to five hours in the recovery room.  What should I bring: Bring a list of your current medicines  to your exam (including vitamins, minerals and over-the-counter drugs). Also bring the results of similar scans you may have had.  Do I need a : **If you will be receiving sedation or general anesthesia, please see special notes below.**  What should I do after the exam? No Restrictions, You may resume normal activities.  What is this test: MRI (magnetic resonance imaging) uses a strong magnet and radio waves to look inside the body. An MRA (magnetic resonance angiogram) does the same thing, but it lets us look at your blood vessels. A computer turns the radio waves into pictures showing cross sections of the body, much like slices of bread. This helps us see any problems more clearly.  Who should I call with questions: Please call the Imaging Department at your exam site with any questions. Directions, parking instructions, and other information is available on our website, [email or web address].  How do I prepare if I m having sedation or anesthesia? **IMPORTANT** THE INSTRUCTIONS BELOW ARE ONLY FOR THOSE PATIENTS WHO HAVE BEEN TOLD THEY WILL RECEIVE SEDATION OR GENERAL ANESTHESIA DURING THEIR MRI PROCEDURE:  IF YOU WILL RECEIVE SEDATION (take medicine to help you relax during your exam): You must get the medicine from your doctor before you arrive. Bring the medicine to the exam. Do not take it at home. Arrive one hour early. Bring someone who can take you home after the test. Your medicine will make you sleepy. After the exam, you may not drive, take a bus or take a taxi by yourself. No eating 8 hours before your exam. You may have clear liquids up until 4 hours before your exam. (Clear liquids include water, clear tea, black coffee and fruit juice without pulp.)  IF YOU WILL RECEIVE ANESTHESIA (be asleep for your exam): Arrive 1 1/2 hours early. Bring someone who can take you home after the test. You may not drive, take a bus or take a taxi by yourself. No eating 8 hours before your exam. You may have  clear liquids up until 4 hours before your exam. (Clear liquids include water, clear tea, black coffee and fruit juice without pulp.) You will spend four to five hours in the recovery room.            Dec 17, 2018 11:45 AM CST   Return Visit with Rj Leary MD   [facility] ([facility]    [address] [place] [address] [phone]              Future tests that were ordered for you today     Open Future Orders        Priority Expected Expires Ordered    MR Shoulder Left w/o Contrast Routine  11/21/2019 11/21/2018            Who to contact     Normal or non-critical lab and imaging results will be communicated to you by [employer], letter or phone within 4 business days after the clinic has received the results. If you do not hear from us within 7 days, please contact the clinic through [employer] or phone. If you have a critical or abnormal lab result, we will notify you by phone as soon as possible.  Submit refill requests through [employer] or call your pharmacy and they will forward the refill request to us. Please allow 3 business days for your refill to be completed.          If you need to speak with a  for additional information , please call: [phone]             Additional Information About Your Visit        [employer] Information     [employer] gives you secure access to your electronic health record. If you see a primary care provider, you can also send messages to your care team and make appointments. If you have questions, please call your primary care clinic.  If you do not have a primary care provider, please call [phone] and they will assist you.        Care EveryWhere ID     This is your Care EveryWhere ID. This could be used by other organizations to access your [place] medical records  [id number]        Your Vitals Were     Pulse Temperature Respirations Pulse Oximetry BMI (Body Mass Index)       82 96.8  F (36  C) (Tympanic)  16 97% 25.09 kg/m2        Blood Pressure from Last 3 Encounters:   11/21/18 131/71   11/17/18 114/64   06/11/18 117/71    Weight from Last 3 Encounters:   11/21/18 185 lb (83.9 kg)   11/16/18 182 lb 8.7 oz (82.8 kg)   06/11/18 180 lb 1.6 oz (81.7 kg)              We Performed the Following     Basic metabolic panel     CBC with platelets differential     Ferritin     Iron and iron binding capacity     Kappa and lambda light chain     Protein electrophoresis timed urine     Protein electrophoresis     Protein Immunofixation Serum     Protein immunofixation urine          Where to get your medicines      These medications were sent to [id number] [place] - [address]  [address] [place] [address]     Phone:  [phone]     metoprolol succinate 25 MG 24 hr tablet          Primary Care Provider Office Phone # Fax #    Cirilo Katelynn Spain PA-C [phone] [address]  [place] [address]        Equal Access to Services     [facility]: Hadii aad ku hadasho Soomaali, waaxda luqadaha, qaybta kaalmada adeegyada, waxay idiin hayaan gerri cabrera . So [facility] [phone].    ATENCIÓN: Si habla español, tiene a francois disposición servicios gratuitos de asistencia lingüística. Llame al [phone].    We comply with applicable federal civil rights laws and [place] laws. We do not discriminate on the basis of race, color, national origin, age, disability, sex, sexual orientation, or gender identity.            Thank you!     Thank you for choosing [facility]  for your care. Our goal is always to provide you with excellent care. Hearing back from our patients is one way we can continue to improve our services. Please take a few minutes to complete the written survey that you may receive in the mail after your visit with us. Thank you!             Your Updated Medication List - Protect others around you: Learn how to safely use, store and throw away your  medicines at www.disposemymeds.org.          This list is accurate as of 11/21/18 11:40 AM.  Always use your most recent med list.                   Brand Name Dispense Instructions for use Diagnosis    aspirin 81 MG tablet      1 TABLET DAILY        IRON SUPPLEMENT PO      Take 45 mg by mouth twice a week        metoprolol succinate 25 MG 24 hr tablet    TOPROL-XL    45 tablet    Take 0.5 tablets (12.5 mg) by mouth daily    Aortic root enlargement (H)       VITAMIN B-12 PO      Take one tablet every other day        VITAMIN D PO      Take one tablet by mouth daily

## 2018-11-23 LAB
ALBUMIN SERPL ELPH-MCNC: 4.2 G/DL (ref 3.7–5.1)
ALPHA1 GLOB SERPL ELPH-MCNC: 0.3 G/DL (ref 0.2–0.4)
ALPHA2 GLOB SERPL ELPH-MCNC: 1 G/DL (ref 0.5–0.9)
B-GLOBULIN SERPL ELPH-MCNC: 0.7 G/DL (ref 0.6–1)
GAMMA GLOB SERPL ELPH-MCNC: 1.7 G/DL (ref 0.7–1.6)
IGA SERPL-MCNC: 127 MG/DL (ref 70–380)
IGG SERPL-MCNC: 1870 MG/DL (ref 695–1620)
IGM SERPL-MCNC: 66 MG/DL (ref 60–265)
KAPPA LC UR-MCNC: 7.81 MG/DL (ref 0.33–1.94)
KAPPA LC/LAMBDA SER: 6.68 {RATIO} (ref 0.26–1.65)
LAMBDA LC SERPL-MCNC: 1.17 MG/DL (ref 0.57–2.63)
M PROTEIN SERPL ELPH-MCNC: 1.2 G/DL
PROT PATTERN SERPL ELPH-IMP: ABNORMAL
PROT PATTERN SERPL IFE-IMP: ABNORMAL

## 2018-11-28 ENCOUNTER — RADIANT APPOINTMENT (OUTPATIENT)
Dept: MRI IMAGING | Facility: CLINIC | Age: 73
End: 2018-11-28
Attending: PHYSICIAN ASSISTANT
Payer: COMMERCIAL

## 2018-11-28 DIAGNOSIS — M75.122 COMPLETE TEAR OF LEFT ROTATOR CUFF: Primary | ICD-10-CM

## 2018-11-28 DIAGNOSIS — S43.432D SUPERIOR GLENOID LABRUM LESION OF LEFT SHOULDER, SUBSEQUENT ENCOUNTER: ICD-10-CM

## 2018-11-28 DIAGNOSIS — R29.898 SHOULDER WEAKNESS: ICD-10-CM

## 2018-11-28 PROCEDURE — 73221 MRI JOINT UPR EXTREM W/O DYE: CPT | Mod: TC

## 2018-12-02 PROCEDURE — 86335 IMMUNFIX E-PHORSIS/URINE/CSF: CPT | Performed by: INTERNAL MEDICINE

## 2018-12-02 PROCEDURE — 84166 PROTEIN E-PHORESIS/URINE/CSF: CPT | Performed by: INTERNAL MEDICINE

## 2018-12-03 ENCOUNTER — RADIANT APPOINTMENT (OUTPATIENT)
Dept: GENERAL RADIOLOGY | Facility: CLINIC | Age: 73
End: 2018-12-03
Attending: PHYSICIAN ASSISTANT
Payer: COMMERCIAL

## 2018-12-03 ENCOUNTER — OFFICE VISIT (OUTPATIENT)
Dept: ORTHOPEDICS | Facility: CLINIC | Age: 73
End: 2018-12-03
Payer: COMMERCIAL

## 2018-12-03 ENCOUNTER — TELEPHONE (OUTPATIENT)
Dept: ORTHOPEDICS | Facility: CLINIC | Age: 73
End: 2018-12-03

## 2018-12-03 VITALS
SYSTOLIC BLOOD PRESSURE: 138 MMHG | DIASTOLIC BLOOD PRESSURE: 76 MMHG | BODY MASS INDEX: 25.06 KG/M2 | HEART RATE: 96 BPM | WEIGHT: 185 LBS | HEIGHT: 72 IN

## 2018-12-03 DIAGNOSIS — M25.512 ACUTE PAIN OF LEFT SHOULDER: ICD-10-CM

## 2018-12-03 DIAGNOSIS — M25.512 CHRONIC LEFT SHOULDER PAIN: ICD-10-CM

## 2018-12-03 DIAGNOSIS — G89.29 CHRONIC LEFT SHOULDER PAIN: ICD-10-CM

## 2018-12-03 DIAGNOSIS — D47.2 MGUS (MONOCLONAL GAMMOPATHY OF UNKNOWN SIGNIFICANCE): ICD-10-CM

## 2018-12-03 DIAGNOSIS — M75.122 COMPLETE ROTATOR CUFF TEAR OF LEFT SHOULDER: Primary | ICD-10-CM

## 2018-12-03 PROCEDURE — 99244 OFF/OP CNSLTJ NEW/EST MOD 40: CPT | Performed by: ORTHOPAEDIC SURGERY

## 2018-12-03 PROCEDURE — 73030 X-RAY EXAM OF SHOULDER: CPT | Mod: LT

## 2018-12-03 NOTE — PROGRESS NOTES
CHIEF COMPLAINT:   Chief Complaint   Patient presents with     Shoulder left     RC tear, fell off the roof 11/18/18, can't lift arm, no pain at rest     Jaron Carrera is seen today in the [facility] for evaluation of left shoulder pain at the request of Cirilo Spain PA-C      HISTORY:  Jaron Carrera is a 73 year old male, right-hand dominant, who is seen for left shoulder injury that occurred on 11/16/2018. Patient was on the roof, stepped on some leaves and fell off. He landed on his left side onto concrete. Was seen at ER and had imaging done. Since the fall, he has trouble lifting the arm. No pain at rest, 0/10. Pain is worsened with lifting the arm. Takes Aleve for pain. No problems in the past. Denies numbness and tingling. Some stiffness of the neck, present prior to injury. No pain at night. Has been sleeping in a recliner recently. Presents with his wife.    Onset: fall off his roof 11/16/2018.  Symptoms have been unchanged since that time.  Aggravated by: with any shoulder range of motion, especially forward flexion   Relieved by: at rest, with Aleve  Present symptoms: pain with overhead activities,  pain reaching behind back,  pain reaching out or away from body (flexion/ abduction),  pain lifting,  weakness with lifting,  Pain location: lateral shoulder  Pain severity: 0/10  Pain quality: dull and aching  Frequency of symptoms: frequently  Associated symptoms: none    Treatment up to this point: Aleve  Has not tried: ice, Heat, Tylenol, PT and Corticosteroid injection, surgery  Prior history of related problems: no prior problems with this area in the past    Significant Orthopedic past medical history: none  Usual level of recreational activity: sedentary  Usual level of work activity: retired    Other PMH:  has a past medical history of Vitamin B12 deficiency and Vitamin D deficiencies.  Patient Active Problem List    Diagnosis Date Noted     Pneumothorax, closed,  traumatic, initial encounter 11/16/2018     Priority: Medium     MGUS (monoclonal gammopathy of unknown significance) 06/11/2018     Priority: Medium     Screening for prostate cancer 09/30/2014     Priority: Medium     Family History: older brother diagnosed at 70 with Prostate cancer- now receiving radiation treatment       Pure hyperglyceridemia 09/17/2013     Priority: Medium     Advanced directives, counseling/discussion 12/13/2012     Priority: Medium     9/30/2014  Discussed with pt.  Wishes to be Full code.  Has a living will, has assigned wife Jayla Carrera as his MPOA.    10/27/2015  - reviewed with pt- remains Full Code, no changes.  Living will scanned into Media tab- from 2013.             CARDIOVASCULAR SCREENING; LDL GOAL LESS THAN 160 10/31/2010     Priority: Medium     Vitamin D deficiency      Priority: Medium     (Problem list name updated by automated process. Provider to review and confirm.)       Vitamin B12 deficiency      Priority: Medium       Surgical Hx:  has a past surgical history that includes surgical history of -  (1995); surgical history of -  (1965); ESOPHAGOSCOPY, DIAGNOSTIC; and Colonoscopy (3/21/2013).    Medications:   Current Outpatient Prescriptions:      ASPIRIN 81 MG PO TABS, 1 TABLET DAILY, Disp: , Rfl:      Ferrous Sulfate (IRON SUPPLEMENT PO), Take 45 mg by mouth twice a week , Disp: , Rfl:      metoprolol succinate (TOPROL-XL) 25 MG 24 hr tablet, Take 0.5 tablets (12.5 mg) by mouth daily, Disp: 45 tablet, Rfl: 3     VITAMIN B-12 PO, Take one tablet every other day, Disp: , Rfl:      VITAMIN D PO, Take one tablet by mouth daily, Disp: , Rfl:     Allergies:   Allergies   Allergen Reactions     Penicillins Hives     Childhood       Social Hx: retired  reports that he quit smoking about 48 years ago. His smoking use included Cigarettes. He has a 2.00 pack-year smoking history. He has never used smokeless tobacco. He reports that he drinks about 1.0 oz of alcohol per week   He reports that he does not use illicit drugs.    Family Hx: family history includes Lipids in his brother; Other Cancer in his brother; Prostate Cancer in his brother; Respiratory in his father; Thyroid Disease in his daughter. There is no history of C.A.D., Cancer - colorectal, Cerebrovascular Disease, or Diabetes..    REVIEW OF SYSTEMS: 10 point ROS neg other than the symptoms noted above in the HPI and PMH. Notables include  CONSTITUTIONAL:NEGATIVE for fever, chills, change in weight  INTEGUMENTARY/SKIN: NEGATIVE for worrisome rashes, moles or lesions  MUSCULOSKELETAL:See HPI above  NEURO: NEGATIVE for weakness, dizziness or paresthesias    This document serves as a record of the services and decisions personally performed and made by Warren Carl MD. It was created on his behalf by Venus Reeves, a trained medical scribe. The creation of this document is based the provider's statements to the medical scribe.    Scribe Venus Reeves 9:25 AM 12/3/2018    PHYSICAL EXAM:  /76 (BP Location: Left arm, Patient Position: Chair, Cuff Size: Adult Regular)  Pulse 96  Ht 1.829 m (6')  Wt 83.9 kg (185 lb)  BMI 25.09 kg/m2   GENERAL APPEARANCE: healthy, alert, no distress  SKIN: no suspicious lesions or rashes  NEURO: Normal strength and tone, mentation intact and speech normal  PSYCH:  mentation appears normal and affect normal, not anxious  RESPIRATORY: No increased work of breathing.  VASCULAR: Radial pulses 2+ and brisk capillary refill     MUSCULOSKELETAL:    NECK:  Cervical range of motion: full, painfree, and does not cause shoulder pain or reproduce shoulder pain.  Posterior cervical spine nontender to palpation over midline bony prominences  There is no tenderness to palpation along neck paraspinals and trapezius muscles  No palpable cervical lymphadenopathy.    RIGHT UPPER EXTREMITY:  Sensation intact to light touch in median, radial, ulnar and axillary nerve distributions  Palpable 2+ radial pulse, brisk  capillary refill to all fingers, wwp  Intact epl fpl fdp edc wrist flexion/extension biceps triceps deltoid    RIGHT SHOULDER:  Shoulder Inspection: mild supraspinatus and infraspinatus atrophy bilaterally  Tender: nontender to palpation   Range of Motion:   Active: forward flexion 170 degrees, external rotation 50 degrees, internal rotation T12  Strength: forward flexion 4/5, External rotation 5-/5   Impingement: negative   Special tests: Belly press: negative     LEFT UPPER EXTREMITY:  Sensation intact to light touch in median, radial, ulnar and axillary nerve distributions  Palpable 2+ radial pulse, brisk capillary refill to all fingers, wwp  Intact epl fpl fdp edc wrist flexion/extension biceps triceps deltoid    LEFT SHOULDER:  Shoulder Inspection: mild supraspinatus and infraspinatus atrophy bilaterally.  Tender: nontender to palpation   Range of Motion:   Active: forward flexion 20 degrees, external rotation 20 degrees, internal rotation T12   Passive: forward flexion 150 degrees with mild pain, external rotation 20 degrees   Strength: forward flexion 3/5, External rotation 3+/5   Impingement: positive   Special tests: Belly press: negative, empty can positive    X-RAY INTERPRETATION: 3 views right shoulder obtained 12/3/2018 were reviewed personally in clinic today with the patient. On my review, No obvious fracture or dislocation. No obvious bony abnormality or lesion. Sclerosis of the greater tuberosity with cystic changes. Mild acromio-clavicular degenerative changes.    MRI left shoulder 11/28/2018  IMPRESSION:   1. Complete full-thickness tears of the supraspinatus and  infraspinatus tendons with involvement of a portion of the adjacent  subscapularis. There is moderate tendinosis elsewhere in the  subscapularis.  2. Moderate degenerative changes of the acromioclavicular joint and  mild degenerative changes of the glenohumeral joint.    ASSESSMENT: Jaron Carrera is a 73 year old male, right-hand  dominant with left shoulder cuff tear arthropathy     PLAN:   * discussed with patient finding of very large rotator cuff tear, its implications and potential treatment options  * discussed various options with patient, including nonop with Physical Therapy, injections, NSAIDS, activity modification versus rotator cuff repair. Risks and benefits of each discussed in detail.  * risks of nonop treatment include continued pain, weakness, progression of tear, development of rotator cuff tear arthropathy and arthrosis, decreased range of motion and stiffness.  * Risks of surgery include, but not limited to: bleeding, infection, pain, scar, damage to adjacent structures (e.g. Nerves, blood vessels, bone, cartilage), temporary or permanent nerve damage, recurrence of symptoms, failure to relieve pain or weakness, stiffness, post-traumatic arthritis, development of rotator cuff tear arthropathy and arthrosis, decreased range of motion and stiffness, need for further surgery, blood clots, pulmonary embolism, risks of anesthesia, death.  * given size of tear with retraction, may not be repairable or if repairable, may not get watertight seal.    * despite these risks, patient would like to proceed with surgery.    * will plan: left shoulder arthroscopic rotator cuff repair, subacromial decompression, distal clavicle excision; with possible proximal biceps tenodesis versus tenotomy; possible mini-open repair or DCE if necessary.    * will perform on outpatient basis, overnight observation if necessary  * patient will need H+P prior to surgery from primary care physician   * will plan followup 2 week postoperative, start Physical Therapy at that time, per protocol, which will be provided to the patient.  * all questions addressed and answered to satisfaction  * continue working on shoulder range of motion to prevent stiffness.      The information in this document, created by a scribe for me, accurately reflects the services I  personally performed and the decisions made by me. I have reviewed and approved this document for accuracy.     Warren Carl M.D., M.S.  Dept. of Orthopaedic Surgery  [facility]

## 2018-12-03 NOTE — LETTER
12/3/2018         RE: Jaron Carrera  [address] [place] [address]        Dear Colleague,    Thank you for referring your patient, Jaron Carrera, to the [facility] [place]. Please see a copy of my visit note below.    CHIEF COMPLAINT:   Chief Complaint   Patient presents with     Shoulder left     RC tear, fell off the roof 11/18/18, can't lift arm, no pain at rest     Jaron Carrera is seen today in the [facility] for evaluation of left shoulder pain at the request of Cirilo Spain PA-C      HISTORY:  Jaron Carrera is a 73 year old male, right-hand dominant, who is seen for left shoulder injury that occurred on 11/16/2018. Patient was on the roof, stepped on some leaves and fell off. He landed on his left side onto concrete. Was seen at ER and had imaging done. Since the fall, he has trouble lifting the arm. No pain at rest, 0/10. Pain is worsened with lifting the arm. Takes Aleve for pain. No problems in the past. Denies numbness and tingling. Some stiffness of the neck, present prior to injury. No pain at night. Has been sleeping in a recliner recently. Presents with his wife.    Onset: fall off his roof 11/16/2018.  Symptoms have been unchanged since that time.  Aggravated by: with any shoulder range of motion, especially forward flexion   Relieved by: at rest, with Aleve  Present symptoms: pain with overhead activities,  pain reaching behind back,  pain reaching out or away from body (flexion/ abduction),  pain lifting,  weakness with lifting,  Pain location: lateral shoulder  Pain severity: 0/10  Pain quality: dull and aching  Frequency of symptoms: frequently  Associated symptoms: none    Treatment up to this point: Aleve  Has not tried: ice, Heat, Tylenol, PT and Corticosteroid injection, surgery  Prior history of related problems: no prior problems with this area in the past    Significant Orthopedic past medical history: none  Usual  level of recreational activity: sedentary  Usual level of work activity: retired    Other PMH:  has a past medical history of Vitamin B12 deficiency and Vitamin D deficiencies.  Patient Active Problem List    Diagnosis Date Noted     Pneumothorax, closed, traumatic, initial encounter 11/16/2018     Priority: Medium     MGUS (monoclonal gammopathy of unknown significance) 06/11/2018     Priority: Medium     Screening for prostate cancer 09/30/2014     Priority: Medium     Family History: older brother diagnosed at 70 with Prostate cancer- now receiving radiation treatment       Pure hyperglyceridemia 09/17/2013     Priority: Medium     Advanced directives, counseling/discussion 12/13/2012     Priority: Medium     9/30/2014  Discussed with pt.  Wishes to be Full code.  Has a living will, has assigned wife Jayla Carrera as his MPOA.    10/27/2015  - reviewed with pt- remains Full Code, no changes.  Living will scanned into Media tab- from 2013.             CARDIOVASCULAR SCREENING; LDL GOAL LESS THAN 160 10/31/2010     Priority: Medium     Vitamin D deficiency      Priority: Medium     (Problem list name updated by automated process. Provider to review and confirm.)       Vitamin B12 deficiency      Priority: Medium       Surgical Hx:  has a past surgical history that includes surgical history of -  (1995); surgical history of -  (1965); ESOPHAGOSCOPY, DIAGNOSTIC; and Colonoscopy (3/21/2013).    Medications:   Current Outpatient Prescriptions:      ASPIRIN 81 MG PO TABS, 1 TABLET DAILY, Disp: , Rfl:      Ferrous Sulfate (IRON SUPPLEMENT PO), Take 45 mg by mouth twice a week , Disp: , Rfl:      metoprolol succinate (TOPROL-XL) 25 MG 24 hr tablet, Take 0.5 tablets (12.5 mg) by mouth daily, Disp: 45 tablet, Rfl: 3     VITAMIN B-12 PO, Take one tablet every other day, Disp: , Rfl:      VITAMIN D PO, Take one tablet by mouth daily, Disp: , Rfl:     Allergies:   Allergies   Allergen Reactions     Penicillins Hives      Childhood       Social Hx: retired  reports that he quit smoking about 48 years ago. His smoking use included Cigarettes. He has a 2.00 pack-year smoking history. He has never used smokeless tobacco. He reports that he drinks about 1.0 oz of alcohol per week  He reports that he does not use illicit drugs.    Family Hx: family history includes Lipids in his brother; Other Cancer in his brother; Prostate Cancer in his brother; Respiratory in his father; Thyroid Disease in his daughter. There is no history of C.A.D., Cancer - colorectal, Cerebrovascular Disease, or Diabetes..    REVIEW OF SYSTEMS: 10 point ROS neg other than the symptoms noted above in the HPI and PMH. Notables include  CONSTITUTIONAL:NEGATIVE for fever, chills, change in weight  INTEGUMENTARY/SKIN: NEGATIVE for worrisome rashes, moles or lesions  MUSCULOSKELETAL:See HPI above  NEURO: NEGATIVE for weakness, dizziness or paresthesias    This document serves as a record of the services and decisions personally performed and made by Warren Carl MD. It was created on his behalf by Venus Reeves, a trained medical scribe. The creation of this document is based the provider's statements to the medical scribe.    Estefany Reeves 9:25 AM 12/3/2018    PHYSICAL EXAM:  /76 (BP Location: Left arm, Patient Position: Chair, Cuff Size: Adult Regular)  Pulse 96  Ht 1.829 m (6')  Wt 83.9 kg (185 lb)  BMI 25.09 kg/m2   GENERAL APPEARANCE: healthy, alert, no distress  SKIN: no suspicious lesions or rashes  NEURO: Normal strength and tone, mentation intact and speech normal  PSYCH:  mentation appears normal and affect normal, not anxious  RESPIRATORY: No increased work of breathing.  VASCULAR: Radial pulses 2+ and brisk capillary refill     MUSCULOSKELETAL:    NECK:  Cervical range of motion: full, painfree, and does not cause shoulder pain or reproduce shoulder pain.  Posterior cervical spine nontender to palpation over midline bony prominences  There is no  tenderness to palpation along neck paraspinals and trapezius muscles  No palpable cervical lymphadenopathy.    RIGHT UPPER EXTREMITY:  Sensation intact to light touch in median, radial, ulnar and axillary nerve distributions  Palpable 2+ radial pulse, brisk capillary refill to all fingers, wwp  Intact epl fpl fdp edc wrist flexion/extension biceps triceps deltoid    RIGHT SHOULDER:  Shoulder Inspection: mild supraspinatus and infraspinatus atrophy bilaterally  Tender: nontender to palpation   Range of Motion:   Active: forward flexion 170 degrees, external rotation 50 degrees, internal rotation T12  Strength: forward flexion 4/5, External rotation 5-/5   Impingement: negative   Special tests: Belly press: negative     LEFT UPPER EXTREMITY:  Sensation intact to light touch in median, radial, ulnar and axillary nerve distributions  Palpable 2+ radial pulse, brisk capillary refill to all fingers, wwp  Intact epl fpl fdp edc wrist flexion/extension biceps triceps deltoid    LEFT SHOULDER:  Shoulder Inspection: mild supraspinatus and infraspinatus atrophy bilaterally.  Tender: nontender to palpation   Range of Motion:   Active: forward flexion 20 degrees, external rotation 20 degrees, internal rotation T12   Passive: forward flexion 150 degrees with mild pain, external rotation 20 degrees   Strength: forward flexion 3/5, External rotation 3+/5   Impingement: positive   Special tests: Belly press: negative, empty can positive    X-RAY INTERPRETATION: 3 views right shoulder obtained 12/3/2018 were reviewed personally in clinic today with the patient. On my review, No obvious fracture or dislocation. No obvious bony abnormality or lesion. Sclerosis of the greater tuberosity with cystic changes. Mild acromio-clavicular degenerative changes.    MRI left shoulder 11/28/2018  IMPRESSION:   1. Complete full-thickness tears of the supraspinatus and  infraspinatus tendons with involvement of a portion of the  adjacent  subscapularis. There is moderate tendinosis elsewhere in the  subscapularis.  2. Moderate degenerative changes of the acromioclavicular joint and  mild degenerative changes of the glenohumeral joint.    ASSESSMENT: Jaron Carrera is a 73 year old male, right-hand dominant with left shoulder cuff tear arthropathy     PLAN:   * discussed with patient finding of very large rotator cuff tear, its implications and potential treatment options  * discussed various options with patient, including nonop with Physical Therapy, injections, NSAIDS, activity modification versus rotator cuff repair. Risks and benefits of each discussed in detail.  * risks of nonop treatment include continued pain, weakness, progression of tear, development of rotator cuff tear arthropathy and arthrosis, decreased range of motion and stiffness.  * Risks of surgery include, but not limited to: bleeding, infection, pain, scar, damage to adjacent structures (e.g. Nerves, blood vessels, bone, cartilage), temporary or permanent nerve damage, recurrence of symptoms, failure to relieve pain or weakness, stiffness, post-traumatic arthritis, development of rotator cuff tear arthropathy and arthrosis, decreased range of motion and stiffness, need for further surgery, blood clots, pulmonary embolism, risks of anesthesia, death.  * given size of tear with retraction, may not be repairable or if repairable, may not get watertight seal.    * despite these risks, patient would like to proceed with surgery.    * will plan: left shoulder arthroscopic rotator cuff repair, subacromial decompression, distal clavicle excision; with possible proximal biceps tenodesis versus tenotomy; possible mini-open repair or DCE if necessary.    * will perform on outpatient basis, overnight observation if necessary  * patient will need H+P prior to surgery from primary care physician   * will plan followup 2 week postoperative, start Physical Therapy at that time, per  protocol, which will be provided to the patient.  * all questions addressed and answered to satisfaction  * continue working on shoulder range of motion to prevent stiffness.      The information in this document, created by a scribe for me, accurately reflects the services I personally performed and the decisions made by me. I have reviewed and approved this document for accuracy.     Warren Carl M.D., M.S.  Dept. of Orthopaedic Surgery  [facility]    Again, thank you for allowing me to participate in the care of your patient.        Sincerely,        Warren Carl MD

## 2018-12-03 NOTE — MR AVS SNAPSHOT
After Visit Summary   12/3/2018    Jaron Carrera    MRN: [id number]           Patient Information     Date Of Birth          1945        Visit Information        Provider Department      12/3/2018 9:15 AM Warren Carl MD [facility] [place]        Today's Diagnoses     Complete rotator cuff tear of left shoulder    -  1    Acute pain of left shoulder           Follow-ups after your visit        Follow-up notes from your care team     Return for Postop Visit.      Your next 10 appointments already scheduled     Dec 07, 2018 11:20 AM CST   Pre-Op physical with Cirilo Spain PA-C   [facility] [place] ([facility])    [address]  [place] [address] [phone]            Dec 17, 2018 11:45 AM CST   Return Visit with Rj Leary MD   [facility]    [address] [place] [address] [phone]            Jan 03, 2019  1:30 PM CST   Return Visit with Warren Carl MD   [facility] [place] ([facility] [place])    [address]  [place] [address] [phone]              Who to contact     If you have questions or need follow up information about today's clinic visit or your schedule please contact [facility] [place] directly at [phone].  Normal or non-critical lab and imaging results will be communicated to you by MyChart, letter or phone within 4 business days after the clinic has received the results. If you do not hear from us within 7 days, please contact the clinic through MyChart or phone. If you have a critical or abnormal lab result, we will notify you by phone as soon as possible.  Submit refill requests through [employer] or call your pharmacy and they will forward the refill request to us. Please allow 3 business days for your refill to be completed.           Additional Information About Your Visit        MyChart Information     [employer] gives you secure access to your electronic health record. If you see a primary care provider, you can also send messages to your care team and make appointments. If you have questions, please call your primary care clinic.  If you do not have a primary care provider, please call [phone] and they will assist you.        Care EveryWhere ID     This is your Care EveryWhere ID. This could be used by other organizations to access your [place] medical records  [id number]        Your Vitals Were     Pulse Height BMI (Body Mass Index)             96 6' (1.829 m) 25.09 kg/m2          Blood Pressure from Last 3 Encounters:   12/03/18 138/76   11/21/18 131/71   11/17/18 114/64    Weight from Last 3 Encounters:   12/03/18 185 lb (83.9 kg)   11/21/18 185 lb (83.9 kg)   11/16/18 182 lb 8.7 oz (82.8 kg)              We Performed the Following     Laura-Operative Worksheet        Primary Care Provider Office Phone # Fax #    Cirilo Spain PA-C [phone]       [address]  [place] [address]        Equal Access to Services     GAURANG TRIMBLE : Hadii aad ku hadasho Soomaali, waaxda luqadaha, qaybta kaalmada adeegyada, waxay idiin hayaan marqueseg kharanancy lasheridan . So [facility] [phone].    ATENCIÓN: Si habla español, tiene a francois disposición servicios gratuitos de asistencia lingüística. Llame al [phone].    We comply with applicable federal civil rights laws and [place] laws. We do not discriminate on the basis of race, color, national origin, age, disability, sex, sexual orientation, or gender identity.            Thank you!     Thank you for choosing [facility] [place]  for your care. Our goal is always to provide you with excellent care. Hearing back from our patients is one way we can continue to improve our services. Please take a few minutes to complete the written survey that you may receive in  the mail after your visit with us. Thank you!             Your Updated Medication List - Protect others around you: Learn how to safely use, store and throw away your medicines at www.disposemymeds.org.          This list is accurate as of 12/3/18  1:24 PM.  Always use your most recent med list.                   Brand Name Dispense Instructions for use Diagnosis    aspirin 81 MG tablet    ASA     1 TABLET DAILY        IRON SUPPLEMENT PO      Take 45 mg by mouth twice a week        metoprolol succinate ER 25 MG 24 hr tablet    TOPROL-XL    45 tablet    Take 0.5 tablets (12.5 mg) by mouth daily    Aortic root enlargement (H)       VITAMIN B-12 PO      Take one tablet every other day        VITAMIN D PO      Take one tablet by mouth daily

## 2018-12-03 NOTE — TELEPHONE ENCOUNTER
Type of surgery: left shoulder arthroscopy,rotator cuff repair,subachromial decompression,distal clavicle resection,poss biceps tenodesis vs tenotomy CPT code 19072,18694, 08992  Complete rotator cuff tear of left shoulder [M75.122]  - Primary       Acute pain of left shoulder [M25.512]       Location of surgery: [facility]  Date and time of surgery: 12-18-18   2:00pm  Surgeon: Dr Carl  Pre-Op Appt Date: 12-7-18  Post-Op Appt Date: 1-3-19   Packet sent out: Yes  Pre-cert/Authorization completed:  No prior auth required per Federal BCBS list and Medicare A.   Date: 12/03/2018    Sent to  and was received. Insurance is valid.

## 2018-12-04 LAB
ALBUMIN MFR UR ELPH: 15 %
ALPHA1 GLOB MFR UR ELPH: 9.1 %
ALPHA2 GLOB MFR UR ELPH: 17.9 %
B-GLOBULIN MFR UR ELPH: 13.6 %
GAMMA GLOB MFR UR ELPH: 44.4 %
M PROTEIN MFR UR ELPH: 28.4 %
PROT ELPH PNL UR ELPH: NORMAL
PROT PATTERN UR ELPH-IMP: ABNORMAL

## 2018-12-07 ENCOUNTER — OFFICE VISIT (OUTPATIENT)
Dept: FAMILY MEDICINE | Facility: CLINIC | Age: 73
End: 2018-12-07
Payer: COMMERCIAL

## 2018-12-07 ENCOUNTER — RADIANT APPOINTMENT (OUTPATIENT)
Dept: GENERAL RADIOLOGY | Facility: CLINIC | Age: 73
End: 2018-12-07
Attending: PHYSICIAN ASSISTANT
Payer: COMMERCIAL

## 2018-12-07 VITALS
DIASTOLIC BLOOD PRESSURE: 73 MMHG | WEIGHT: 185.4 LBS | SYSTOLIC BLOOD PRESSURE: 138 MMHG | OXYGEN SATURATION: 97 % | TEMPERATURE: 97.1 F | HEART RATE: 76 BPM | RESPIRATION RATE: 16 BRPM | HEIGHT: 72 IN | BODY MASS INDEX: 25.11 KG/M2

## 2018-12-07 DIAGNOSIS — J93.9 PNEUMOTHORAX ON LEFT: ICD-10-CM

## 2018-12-07 DIAGNOSIS — M75.102 TEAR OF LEFT ROTATOR CUFF, UNSPECIFIED TEAR EXTENT: ICD-10-CM

## 2018-12-07 DIAGNOSIS — Z01.818 PREOP GENERAL PHYSICAL EXAM: Primary | ICD-10-CM

## 2018-12-07 PROCEDURE — 99214 OFFICE O/P EST MOD 30 MIN: CPT | Performed by: PHYSICIAN ASSISTANT

## 2018-12-07 PROCEDURE — 71046 X-RAY EXAM CHEST 2 VIEWS: CPT | Mod: FY

## 2018-12-07 PROCEDURE — 93000 ELECTROCARDIOGRAM COMPLETE: CPT | Performed by: PHYSICIAN ASSISTANT

## 2018-12-07 ASSESSMENT — PAIN SCALES - GENERAL: PAINLEVEL: NO PAIN (0)

## 2018-12-07 NOTE — MR AVS SNAPSHOT
After Visit Summary   12/7/2018    Jaron Carrera    MRN: [id number]           Patient Information     Date Of Birth          1945        Visit Information        Provider Department      12/7/2018 11:20 AM Cirilo Spain PA-C [facility]        Today's Diagnoses     Preop general physical exam    -  1    Tear of left rotator cuff, unspecified tear extent        Pneumothorax on left          Care Instructions      Before Your Surgery      Call your surgeon if there is any change in your health. This includes signs of a cold or flu (such as a sore throat, runny nose, cough, rash or fever).    Do not smoke, drink alcohol or take over the counter medicine (unless your surgeon or primary care doctor tells you to) for the 24 hours before and after surgery.    If you take prescribed drugs: Follow your doctor s orders about which medicines to take and which to stop until after surgery.    Eating and drinking prior to surgery: follow the instructions from your surgeon    Take a shower or bath the night before surgery. Use the soap your surgeon gave you to gently clean your skin. If you do not have soap from your surgeon, use your regular soap. Do not shave or scrub the surgery site.  Wear clean pajamas and have clean sheets on your bed.           Follow-ups after your visit        Your next 10 appointments already scheduled     Dec 07, 2018 12:00 PM CST   XR CHEST 2 VIEWS with BEXR1   [facility] [place] ([facility])    [address] [address]   [phone]           How do I prepare for my exam? (Food and drink instructions) No Food and Drink Restrictions.  How do I prepare for my exam? (Other instructions) You do not need to do anything special for this exam.  What should I wear: Wear comfortable clothes.  How long does the exam take: Most scans take less than 5 minutes.  What should I bring: Bring a list of your medicines, including  vitamins, minerals and over-the-counter drugs. It is safest to leave personal items at home.  Do I need a :  No  is needed.  What do I need to tell my doctor: Tell your doctor if there s any chance you are pregnant.  What should I do after the exam: No restrictions, You may resume normal activities.  What is this test: An image of a specific body part shown in shades of black and white.  Who should I call with questions: If you have any questions, please call the Imaging Department where you will have your exam. Directions, parking instructions, and other information is available on our website, [email or web address].            Dec 17, 2018 11:45 AM CST   Return Visit with Rj Leary MD   [facility]    [address] [place] [id number] [phone]            Jan 03, 2019  1:30 PM CST   Return Visit with Warren Carl MD   [facility] ([facility])    [address] [place] [address] [address]  [facility] [address] [phone]              Who to contact     Normal or non-critical lab and imaging results will be communicated to you by MyChart, letter or phone within 4 business days after the clinic has received the results. If you do not hear from us within 7 days, please contact the clinic through MyChart or phone. If you have a critical or abnormal lab result, we will notify you by phone as soon as possible.  Submit refill requests through [employer] or call your pharmacy and they will forward the refill request to us. Please allow 3 business days for your refill to be completed.          If you need to speak with a  for additional information , please call: [phone]             Additional Information About Your Visit        UB. Information     [employer] gives you secure access to your electronic health record. If you see a primary care provider, you can also send  messages to your care team and make appointments. If you have questions, please call your primary care clinic.  If you do not have a primary care provider, please call [phone] and they will assist you.        Care EveryWhere ID     This is your Care EveryWhere ID. This could be used by other organizations to access your [place] medical records  [id number]        Your Vitals Were     Pulse Temperature Respirations Height Pulse Oximetry BMI (Body Mass Index)    76 97.1  F (36.2  C) (Tympanic) 16 6' (1.829 m) 97% 25.14 kg/m2       Blood Pressure from Last 3 Encounters:   12/07/18 138/73   12/03/18 138/76   11/21/18 131/71    Weight from Last 3 Encounters:   12/07/18 185 lb 6.4 oz (84.1 kg)   12/03/18 185 lb (83.9 kg)   11/21/18 185 lb (83.9 kg)              We Performed the Following     EKG 12-lead complete w/read - Clinics     XR Chest 2 Views        Primary Care Provider Office Phone # Fax #    Cirilo Spain PA-C [phone]       [address] [facility] [address]        Equal Access to Services     GAURANG TRIMBLE : Hadii aad ku hadasho Soomaali, waaxda luqadaha, qaybta kaalmada adeegyada, waxay idiin hayaan marqueseg felicia lasheridan . So [facility] [phone].    ATENCIÓN: Si habla español, tiene a francois disposición servicios gratuitos de asistencia lingüística. Llame al [phone].    We comply with applicable federal civil rights laws and [place] laws. We do not discriminate on the basis of race, color, national origin, age, disability, sex, sexual orientation, or gender identity.            Thank you!     Thank you for choosing [facility] [place]  for your care. Our goal is always to provide you with excellent care. Hearing back from our patients is one way we can continue to improve our services. Please take a few minutes to complete the written survey that you may receive in the mail after your visit with us. Thank you!             Your Updated Medication List - Protect others around  you: Learn how to safely use, store and throw away your medicines at www.disposemymeds.org.          This list is accurate as of 12/7/18 11:59 AM.  Always use your most recent med list.                   Brand Name Dispense Instructions for use Diagnosis    aspirin 81 MG tablet    ASA     1 TABLET DAILY        IRON SUPPLEMENT PO      Take 45 mg by mouth twice a week        metoprolol succinate ER 25 MG 24 hr tablet    TOPROL-XL    45 tablet    Take 0.5 tablets (12.5 mg) by mouth daily    Aortic root enlargement (H)       VITAMIN B-12 PO      Take one tablet every other day        VITAMIN D PO      Take one tablet by mouth daily

## 2018-12-07 NOTE — PROGRESS NOTES
[facility]  [address] [facility]  [place] [address]  [phone]  Dept: [phone]    PRE-OP EVALUATION:  Today's date: 2018    Jaron Carrera (: 1945) presents for pre-operative evaluation assessment as requested by Dr. Carl.  He requires evaluation and anesthesia risk assessment prior to undergoing surgery/procedure for treatment of Rotator cuff repair/ arthroscopy    Proposed Surgery/ Procedure: Left rotator cuff repair/arthroscopy  Date of Surgery/ Procedure: 18  Time of Surgery/ Procedure: 2:00  Hospital/Surgical Facility: [facility]    Primary Physician: Cirilo Spain  Type of Anesthesia Anticipated: General    Patient has a Health Care Directive or Living Will:  YES at home    1. NO - Do you have a history of heart attack, stroke, stent, bypass or surgery on an artery in the head, neck, heart or legs?  2. NO - Do you ever have any pain or discomfort in your chest?  3. NO - Do you have a history of  Heart Failure?  4. NO - Are you troubled by shortness of breath when: walking on the level, up a slight hill or at night?  5. NO - Do you currently have a cold, bronchitis or other respiratory infection?  6. NO - Do you have a cough, shortness of breath or wheezing?  7. NO - Do you sometimes get pains in the calves of your legs when you walk?  8. YES - DO YOU OR ANYONE IN YOUR FAMILY HAVE PREVIOUS HISTORY OF BLOOD CLOTS?  Sister one episode of a blood clot.  9. NO - Do you or does anyone in your family have a serious bleeding problem such as prolonged bleeding following surgeries or cuts?  10. YES - HAVE YOU EVER HAD PROBLEMS WITH ANEMIA OR BEEN TOLD TO TAKE IRON PILLS? Iron pills- had low hemoglobin  11. NO - Have you had any abnormal blood loss such as black, tarry or bloody stools, or abnormal vaginal bleeding?  12. NO - Have you ever had a blood transfusion?  13. NO - Have you or any of your relatives ever had problems with anesthesia?  14. NO -  Do you have sleep apnea, excessive snoring or daytime drowsiness?  15. NO - Do you have any prosthetic heart valves?  16. NO - Do you have prosthetic joints?  17. NO - Is there any chance that you may be pregnant?      HPI:     HPI related to upcoming procedure: recent injury resulting in a left  rotator cuff tear.      See problem list for active medical problems.  Problems all longstanding and stable, except as noted/documented.  See ROS for pertinent symptoms related to these conditions.                                                                                                                                                          .    MEDICAL HISTORY:     Patient Active Problem List    Diagnosis Date Noted     Pneumothorax, closed, traumatic, initial encounter 11/16/2018     Priority: Medium     MGUS (monoclonal gammopathy of unknown significance) 06/11/2018     Priority: Medium     Screening for prostate cancer 09/30/2014     Priority: Medium     Family History: older brother diagnosed at 70 with Prostate cancer- now receiving radiation treatment       Pure hyperglyceridemia 09/17/2013     Priority: Medium     Advanced directives, counseling/discussion 12/13/2012     Priority: Medium     9/30/2014  Discussed with pt.  Wishes to be Full code.  Has a living will, has assigned wife Jayla Carrera as his MPOA.    10/27/2015  - reviewed with pt- remains Full Code, no changes.  Living will scanned into Media tab- from 2013.             CARDIOVASCULAR SCREENING; LDL GOAL LESS THAN 160 10/31/2010     Priority: Medium     Vitamin D deficiency      Priority: Medium     (Problem list name updated by automated process. Provider to review and confirm.)       Vitamin B12 deficiency      Priority: Medium      Past Medical History:   Diagnosis Date     Vitamin B12 deficiency      Vitamin D deficiencies      Past Surgical History:   Procedure Laterality Date     COLONOSCOPY  3/21/2013    Procedure: COLONOSCOPY;   COLONOSCOPY SCREEN;  Surgeon: Walker Thompson MD;  Location:  OR      ESOPHAGOSCOPY, DIAGNOSTIC       SURGICAL HISTORY OF -   1995    Varicose Veins- Stripping      SURGICAL HISTORY OF -   1965    Lt Knee Surgery torn MM      Current Outpatient Prescriptions   Medication Sig Dispense Refill     ASPIRIN 81 MG PO TABS 1 TABLET DAILY       Ferrous Sulfate (IRON SUPPLEMENT PO) Take 45 mg by mouth twice a week        metoprolol succinate (TOPROL-XL) 25 MG 24 hr tablet Take 0.5 tablets (12.5 mg) by mouth daily 45 tablet 3     VITAMIN B-12 PO Take one tablet every other day       VITAMIN D PO Take one tablet by mouth daily       OTC products: None, except as noted above    Allergies   Allergen Reactions     Penicillins Hives     Childhood      Latex Allergy: NO    Social History   Substance Use Topics     Smoking status: Former Smoker     Packs/day: 1.00     Years: 2.00     Types: Cigarettes     Quit date: 7/20/1970     Smokeless tobacco: Never Used     Alcohol use 1.0 oz/week     2 Cans of beer per week      Comment: 1-2 beers weekly     History   Drug Use No       REVIEW OF SYSTEMS:   Constitutional, neuro, ENT, endocrine, pulmonary, cardiac, gastrointestinal, genitourinary, musculoskeletal, integument and psychiatric systems are negative, except as otherwise noted.    EXAM:   /73  Pulse 76  Temp 97.1  F (36.2  C) (Tympanic)  Resp 16  Ht 6' (1.829 m)  Wt 185 lb 6.4 oz (84.1 kg)  SpO2 97%  BMI 25.14 kg/m2    GENERAL APPEARANCE: healthy, alert and no distress     EYES: EOMI,  PERRL     HENT: ear canals and TM's normal and nose and mouth without ulcers or lesions     NECK: no adenopathy, no asymmetry, masses, or scars and thyroid normal to palpation     RESP: lungs clear to auscultation - no rales, rhonchi or wheezes     CV: regular rates and rhythm, normal S1 S2, no S3 or S4 and no murmur, click or rub     ABDOMEN:  soft, nontender, no HSM or masses and bowel sounds normal     SKIN: no suspicious  lesions or rashes     NEURO: Normal strength and tone, sensory exam grossly normal, mentation intact and speech normal     PSYCH: mentation appears normal. and affect normal/bright     LYMPHATICS: No cervical adenopathy    DIAGNOSTICS:       Recent Labs   Lab Test  11/21/18   1147  11/16/18   1332   HGB  13.9  14.9   PLT  175  160   NA  138  138   POTASSIUM  4.1  3.8   CR  0.90  1.07        IMPRESSION:       The proposed surgical procedure is considered INTERMEDIATE risk.    REVISED CARDIAC RISK INDEX  The patient has the following serious cardiovascular risks for perioperative complications such as (MI, PE, VFib and 3  AV Block):  No serious cardiac risks  INTERPRETATION: 2 risks: Class III (moderate risk - 6.6% complication rate)    The patient has the following additional risks for perioperative complications:  The 10-year ASCVD risk score (Pottsborologan MONSON Jr, et al., 2013) is: 28.4%    Values used to calculate the score:      Age: 73 years      Sex: Male      Is Non- : No      Diabetic: No      Tobacco smoker: No      Systolic Blood Pressure: 138 mmHg      Is BP treated: Yes      HDL Cholesterol: 50 mg/dL      Total Cholesterol: 202 mg/dL  Stress test 5/18: no concerning findings.  Patient denies exertional chest pain         ICD-10-CM    1. Preop general physical exam Z01.818 EKG 12-lead complete w/read - Clinics   2. Tear of left rotator cuff, unspecified tear extent M75.102    3. Pneumothorax on left J93.9 XR Chest 2 Views       RECOMMENDATIONS:     Hold aspirin /nsaids 1 week prior to surgery    --Patient is to take all scheduled medications on the day of surgery EXCEPT for modifications listed below.    APPROVAL GIVEN to proceed with proposed procedure, without further diagnostic evaluation       Signed Electronically by: Cirilo Spain PA-C    Copy of this evaluation report is provided to requesting physician.    [facility] Preop Guidelines    Revised Cardiac Risk Index

## 2018-12-12 ENCOUNTER — TELEPHONE (OUTPATIENT)
Dept: ORTHOPEDICS | Facility: CLINIC | Age: 73
End: 2018-12-12

## 2018-12-14 ENCOUNTER — TELEPHONE (OUTPATIENT)
Dept: FAMILY MEDICINE | Facility: CLINIC | Age: 73
End: 2018-12-14

## 2018-12-14 NOTE — TELEPHONE ENCOUNTER
[facility] is calling to request pre op done 12/07/18 with any Lab and EKG, fax: [phone]. Thank you.

## 2018-12-17 ENCOUNTER — ONCOLOGY VISIT (OUTPATIENT)
Dept: ONCOLOGY | Facility: CLINIC | Age: 73
End: 2018-12-17
Payer: COMMERCIAL

## 2018-12-17 VITALS
HEIGHT: 72 IN | TEMPERATURE: 98.5 F | RESPIRATION RATE: 18 BRPM | BODY MASS INDEX: 24.65 KG/M2 | OXYGEN SATURATION: 98 % | HEART RATE: 85 BPM | WEIGHT: 182 LBS | SYSTOLIC BLOOD PRESSURE: 123 MMHG | DIASTOLIC BLOOD PRESSURE: 71 MMHG

## 2018-12-17 DIAGNOSIS — D47.2 MGUS (MONOCLONAL GAMMOPATHY OF UNKNOWN SIGNIFICANCE): Primary | ICD-10-CM

## 2018-12-17 PROCEDURE — 99214 OFFICE O/P EST MOD 30 MIN: CPT | Performed by: INTERNAL MEDICINE

## 2018-12-17 ASSESSMENT — PAIN SCALES - GENERAL: PAINLEVEL: NO PAIN (0)

## 2018-12-17 ASSESSMENT — MIFFLIN-ST. JEOR: SCORE: 1608.55

## 2018-12-17 NOTE — Clinical Note
12/17/2018         RE: Jaron Carrera  [address] [place] [address]        Dear Colleague,    Thank you for referring your patient, Jaron Carrera, to the [facility]. Please see a copy of my visit note below.      FOLLOW-UP VISIT NOTE    PATIENT NAME: Jaron Carrera MRN # [id number]  DATE OF VISIT: Dec 17, 2018 YOB: 1945    REFERRING PROVIDER: Cirilo Spain PA-C  [id number] [place] [address]    Reason for follow up   Monoclonal gammopathy    HISTORY:  73-year-old male with no significant past medical history who in April 2018 presented to primary care provider with complaints of fatigue. He was noted to have mild anemia was further workup was ordered including protein electrophoresis which revealed monoclonal paraprotein along with elevated ferritin and CRP. Subsequently serum immunofixation and urine immunofixation were requested which revealed 1.3 g/ dl IgG kappa paraprotein with IgG 1880 mg/dl.    05/21/18 negative for evidence of end organ damage( CRAB criteria)  bone marrow biopsy showing 2-3% plasma cells. Clinical impression consistent with monoclonal gammopathy of uncertain clinical significance of  IgG Kappa type     SUBJECTIVE     Patient is in clinic today for routine follow-up and review recent labs. No new complaints. He is scheduled for rotator cuff surgery later this week after he fell from [facility] top 1 month ago. Denies weight loss, fever/chills, bone pain abdominal pain, diarrhea, nausea/vomiting or any other complaints      PAST MEDICAL HISTORY     Past Medical History:   Diagnosis Date     Vitamin B12 deficiency      Vitamin D deficiencies          CURRENT OUTPATIENT MEDICATIONS     Current Outpatient Medications   Medication Sig Dispense Refill     ASPIRIN 81 MG PO TABS 1 TABLET DAILY       Ferrous Sulfate (IRON SUPPLEMENT PO) Take 45 mg by mouth twice a week        metoprolol succinate (TOPROL-XL) 25 MG 24 hr tablet Take 0.5  tablets (12.5 mg) by mouth daily 45 tablet 3     VITAMIN B-12 PO Take one tablet every other day       VITAMIN D PO Take one tablet by mouth daily          ALLERGIES     Allergies   Allergen Reactions     Penicillins Hives     Childhood        REVIEW OF SYSTEMS   As above in the HPI, o/w complete 12-point ROS was negative.     PHYSICAL EXAM   B/P: 117/71, T: 97.7, P: 98, R: 18  SpO2 Readings from Last 4 Encounters:   06/11/18 98%   05/22/18 97%   05/21/18 98%   05/11/18 99%     Wt Readings from Last 3 Encounters:   12/17/18 82.6 kg (182 lb)   12/07/18 84.1 kg (185 lb 6.4 oz)   12/03/18 83.9 kg (185 lb)     GEN: NAD  EYES:PERRLA  Mouth/ENT: Oropharynx is clear.  LUNGS: clear bilaterally  CV: regular, no murmurs, rubs, or gallops  ABDOMEN: soft, non-tendern  EXT: warm, well perfused, no edema  NEURO: alert  SKIN: no rashes     LABORATORY AND IMAGING STUDIES     Lab Results   Component Value Date    WBC 6.2 11/21/2018     Lab Results   Component Value Date    RBC 4.57 11/21/2018     Lab Results   Component Value Date    HGB 13.9 11/21/2018     Lab Results   Component Value Date    HCT 41.5 11/21/2018     No components found for: MCT  Lab Results   Component Value Date    MCV 91 11/21/2018     Lab Results   Component Value Date    MCH 30.4 11/21/2018     Lab Results   Component Value Date    MCHC 33.5 11/21/2018     Lab Results   Component Value Date    RDW 13.9 11/21/2018     Lab Results   Component Value Date     11/21/2018     Recent Labs   Lab Test 11/21/18  1147 11/16/18  1332    138   POTASSIUM 4.1 3.8   CHLORIDE 105 105   CO2 27 28   ANIONGAP 6 5   GLC 93 109*   BUN 18 28   CR 0.90 1.07   BLAINE 8.8 8.5     Component      Latest Ref Rng & Units 11/21/2018   Albumin Fraction      3.7 - 5.1 g/dL 4.2   Alpha 1 Fraction      0.2 - 0.4 g/dL 0.3   Alpha 2 Fraction      0.5 - 0.9 g/dL 1.0 (H)   Beta Fraction      0.6 - 1.0 g/dL 0.7   Gamma Fraction      0.7 - 1.6 g/dL 1.7 (H)   Monoclonal Peak      0.0 g/dL  1.2 (H)   ELP Interpretation:       Monoclonal protein (1.2 g/dL) seen in the gamma fraction. See immunofixation report on . . .   Immunofixation ELP       (Note)   IGG      695 - 1,620 mg/dL 1,870 (H)   IGA      70 - 380 mg/dL 127   IGM      60 - 265 mg/dL 66   [place] Free Lt Chain      0.33 - 1.94 mg/dL 7.81 (H)   Lambda Free Lt Chain      0.57 - 2.63 mg/dL 1.17   Kappa Lambda Ratio      0.26 - 1.65 6.68 (H)     Component      Latest Ref Rng & Units 12/2/2018   Albumin Fraction Urine      0 % 15.0 (H)   Alpha 1 Fraction Urine      0 % 9.1 (H)   Alpha 2 Fraction Urine      0 % 17.9 (H)   Beta Fraction Urine      0 % 13.6 (H)   Gamma Fraction Urine      0 % 44.4 (H)   Monoclonal Peak Urine      0% % 28.4 (H)   ELP Interpretation Urine       Trace albumin and globulins seen.  A monoclonal protein (30.5 %) is seen in the gamma . . .          Resulting Lab: [facility]       Comments:            Monoclonal IgG immunoglobulin of kappa light chain type.              Protein immunofixation urine       No reference range information available       Resulting Lab: [facility]       Comments:            Monoclonal free immunoglobulin light chain of kappa chain             type.      ASSESSMENT AND PLAN     73year-old male with workup of fatigue and mild anemia of inflammation in April 2018  was noted to have a monoclonal IgG Kappa paraprotein.     Monoclonal paraproteinemia    Clinical impression consistent with monoclonal gammopathy of uncertain clinical significance of  IgG Kappa type   Low Intermediate risk given abnormal free light chain ratio with a 21 % risk of progression to multiple myeloma over 20 years.  Lab results reviewed with patient today. Shows stable monoclonal protein in the serum with slightly increased kappa light chains in serum in the last 6 months. Patient was informed that he continues to be negative for evidence of end organ damage( CRAB  criteria) with previous bone marrow biopsy showing 2-3% plasma cells  We'll repeat evaluation at 4 months with CBC with differential, chemistry panel, SPEP, UPEP, and Iight chain ratio    - Anemia of inflammation vs iron deficiency  Currently on oral iron supplementation daily        RTC in 4 months for follow-up with labs    Chart documentation with Dragon Voice recognition Software. Although reviewed after completion, some words and grammatical errors may remain.  Rj Leary MD  Attending Physician   Hematology/Medical Oncology    Again, thank you for allowing me to participate in the care of your patient.        Sincerely,        Rj Leary MD

## 2018-12-17 NOTE — NURSING NOTE
Oncology Rooming Note    December 17, 2018 12:09 PM   Jaron Carrera is a 73 year old male who presents for:    Chief Complaint   Patient presents with     Oncology Clinic Visit     6 month follow up     Initial Vitals: /71   Pulse 85   Temp 98.5  F (36.9  C)   Resp 18   Ht 1.829 m (6')   Wt 82.6 kg (182 lb)   SpO2 98%   BMI 24.68 kg/m   Estimated body mass index is 24.68 kg/m  as calculated from the following:    Height as of this encounter: 1.829 m (6').    Weight as of this encounter: 82.6 kg (182 lb). Body surface area is 2.05 meters squared.  No Pain (0) Comment: Data Unavailable   No LMP for male patient.  Allergies reviewed: Yes  Medications reviewed: Yes    Medications: Medication refills not needed today.  Pharmacy name entered into [employer]: [id number] - [place], [place] - [address]         5 minutes for nursing intake (face to face time)     Demetria Paris LPN

## 2018-12-17 NOTE — PROGRESS NOTES
FOLLOW-UP VISIT NOTE    PATIENT NAME: Jaron Carrera MRN # [id number]  DATE OF VISIT: Dec 17, 2018 YOB: 1945    REFERRING PROVIDER: Cirilo Spain PA-C  [address] [place] [address]    Reason for follow up   Monoclonal gammopathy    HISTORY:  73-year-old male with no significant past medical history who in April 2018 presented to primary care provider with complaints of fatigue. He was noted to have mild anemia was further workup was ordered including protein electrophoresis which revealed monoclonal paraprotein along with elevated ferritin and CRP. Subsequently serum immunofixation and urine immunofixation were requested which revealed 1.3 g/ dl IgG kappa paraprotein with IgG 1880 mg/dl.    05/21/18 negative for evidence of end organ damage( CRAB criteria)  bone marrow biopsy showing 2-3% plasma cells. Clinical impression consistent with monoclonal gammopathy of uncertain clinical significance of  IgG Kappa type     SUBJECTIVE     Patient is in clinic today for routine follow-up and review recent labs. No new complaints. He is scheduled for rotator cuff surgery later this week after he fell from [employer] top 1 month ago. Denies weight loss, fever/chills, bone pain abdominal pain, diarrhea, nausea/vomiting or any other complaints      PAST MEDICAL HISTORY     Past Medical History:   Diagnosis Date     Vitamin B12 deficiency      Vitamin D deficiencies          CURRENT OUTPATIENT MEDICATIONS     Current Outpatient Medications   Medication Sig Dispense Refill     ASPIRIN 81 MG PO TABS 1 TABLET DAILY       Ferrous Sulfate (IRON SUPPLEMENT PO) Take 45 mg by mouth twice a week        metoprolol succinate (TOPROL-XL) 25 MG 24 hr tablet Take 0.5 tablets (12.5 mg) by mouth daily 45 tablet 3     VITAMIN B-12 PO Take one tablet every other day       VITAMIN D PO Take one tablet by mouth daily          ALLERGIES     Allergies   Allergen Reactions     Penicillins Hives     Childhood        REVIEW OF  SYSTEMS   As above in the HPI, o/w complete 12-point ROS was negative.     PHYSICAL EXAM   B/P: 117/71, T: 97.7, P: 98, R: 18  SpO2 Readings from Last 4 Encounters:   06/11/18 98%   05/22/18 97%   05/21/18 98%   05/11/18 99%     Wt Readings from Last 3 Encounters:   12/17/18 82.6 kg (182 lb)   12/07/18 84.1 kg (185 lb 6.4 oz)   12/03/18 83.9 kg (185 lb)     GEN: NAD  EYES:PERRLA  Mouth/ENT: Oropharynx is clear.  LUNGS: clear bilaterally  CV: regular, no murmurs, rubs, or gallops  ABDOMEN: soft, non-tendern  EXT: warm, well perfused, no edema  NEURO: alert  SKIN: no rashes     LABORATORY AND IMAGING STUDIES     Lab Results   Component Value Date    WBC 6.2 11/21/2018     Lab Results   Component Value Date    RBC 4.57 11/21/2018     Lab Results   Component Value Date    HGB 13.9 11/21/2018     Lab Results   Component Value Date    HCT 41.5 11/21/2018     No components found for: MCT  Lab Results   Component Value Date    MCV 91 11/21/2018     Lab Results   Component Value Date    MCH 30.4 11/21/2018     Lab Results   Component Value Date    MCHC 33.5 11/21/2018     Lab Results   Component Value Date    RDW 13.9 11/21/2018     Lab Results   Component Value Date     11/21/2018     Recent Labs   Lab Test 11/21/18  1147 11/16/18  1332    138   POTASSIUM 4.1 3.8   CHLORIDE 105 105   CO2 27 28   ANIONGAP 6 5   GLC 93 109*   BUN 18 28   CR 0.90 1.07   BLAINE 8.8 8.5     Component      Latest Ref Rng & Units 11/21/2018   Albumin Fraction      3.7 - 5.1 g/dL 4.2   Alpha 1 Fraction      0.2 - 0.4 g/dL 0.3   Alpha 2 Fraction      0.5 - 0.9 g/dL 1.0 (H)   Beta Fraction      0.6 - 1.0 g/dL 0.7   Gamma Fraction      0.7 - 1.6 g/dL 1.7 (H)   Monoclonal Peak      0.0 g/dL 1.2 (H)   ELP Interpretation:       Monoclonal protein (1.2 g/dL) seen in the gamma fraction. See immunofixation report on . . .   Immunofixation ELP       (Note)   IGG      695 - 1,620 mg/dL 1,870 (H)   IGA      70 - 380 mg/dL 127   IGM      60 - 265  mg/dL 66   [place] Free Lt Chain      0.33 - 1.94 mg/dL 7.81 (H)   Lambda Free Lt Chain      0.57 - 2.63 mg/dL 1.17   Kappa Lambda Ratio      0.26 - 1.65 6.68 (H)     Component      Latest Ref Rng & Units 12/2/2018   Albumin Fraction Urine      0 % 15.0 (H)   Alpha 1 Fraction Urine      0 % 9.1 (H)   Alpha 2 Fraction Urine      0 % 17.9 (H)   Beta Fraction Urine      0 % 13.6 (H)   Gamma Fraction Urine      0 % 44.4 (H)   Monoclonal Peak Urine      0% % 28.4 (H)   ELP Interpretation Urine       Trace albumin and globulins seen.  A monoclonal protein (30.5 %) is seen in the gamma . . .          Resulting Lab: [facility]       Comments:            Monoclonal IgG immunoglobulin of kappa light chain type.              Protein immunofixation urine       No reference range information available       Resulting Lab: [facility]       Comments:            Monoclonal free immunoglobulin light chain of kappa chain             type.      ASSESSMENT AND PLAN     73year-old male with workup of fatigue and mild anemia of inflammation in April 2018  was noted to have a monoclonal IgG Kappa paraprotein.     Monoclonal paraproteinemia    Clinical impression consistent with monoclonal gammopathy of uncertain clinical significance of  IgG Kappa type   Low Intermediate risk given abnormal free light chain ratio with a 21 % risk of progression to multiple myeloma over 20 years.  Lab results reviewed with patient today. Shows stable monoclonal protein in the serum with slightly increased kappa light chains in serum in the last 6 months. Patient was informed that he continues to be negative for evidence of end organ damage( CRAB criteria) with previous bone marrow biopsy showing 2-3% plasma cells  We'll repeat evaluation at 4 months with CBC with differential, chemistry panel, SPEP, UPEP, and Iight chain ratio    - Anemia of inflammation vs iron deficiency  Currently on oral  iron supplementation daily        RTC in 4 months for follow-up with labs    Chart documentation with Dragon Voice recognition Software. Although reviewed after completion, some words and grammatical errors may remain.  Rj Leary MD  Attending Physician   Hematology/Medical Oncology

## 2019-01-03 ENCOUNTER — OFFICE VISIT (OUTPATIENT)
Dept: ORTHOPEDICS | Facility: CLINIC | Age: 74
End: 2019-01-03
Payer: COMMERCIAL

## 2019-01-03 VITALS
BODY MASS INDEX: 24.92 KG/M2 | HEIGHT: 72 IN | WEIGHT: 184 LBS | SYSTOLIC BLOOD PRESSURE: 124 MMHG | DIASTOLIC BLOOD PRESSURE: 76 MMHG

## 2019-01-03 DIAGNOSIS — Z98.890 S/P LEFT ROTATOR CUFF REPAIR: Primary | ICD-10-CM

## 2019-01-03 PROCEDURE — 99024 POSTOP FOLLOW-UP VISIT: CPT | Performed by: ORTHOPAEDIC SURGERY

## 2019-01-03 ASSESSMENT — MIFFLIN-ST. JEOR: SCORE: 1617.62

## 2019-01-03 NOTE — PROGRESS NOTES
chief complaint:   Chief Complaint   Patient presents with     Left Shoulder - RECHECK     SURGERY: left shoulder rotator cuff repair, arthroscopic ( [facility] ).  1. Left shoulder arthroscopic rotator cuff tear, large to massive repair, double-row technique.  2. Left shoulder arthroscopic proximal biceps tenodesis  3. Left shoulder arthroscopic labral debridement  4. Left shoulder arthroscopic subacromial decompression with partial acromioplasty     DATE OF SURGERY: 12/18/2018      HISTORY OF PRESENT ILLNESS:  Jaron Carrera is a 73 year old male seen for postoperative evaluation of a left shoulder arthroscopic rotator cuff repair and proximal biceps tenodesis, labral debridement and subacromial decompression with partial acromioplasty for left shoulder rotator cuff tear, large to massive, with retraction and tendinosis, type I SLAP tear and proximal biceps tendinosis, medial subluxation, acromioclavicular arthritis, impingement syndrome. Surgery occurred 2 weeks ago. Returns today stating he is doing OK. Pain has been manageable. Mild today, rated a 3/10. He is no longer on pain medication. Will take Advil as needed for pain. Had severe pain the first 3 days after surgery, but since then has improved. Patient continues to have pain at night. Worsens with sleeping. No problems with the surgical wounds. Denies fevers chills or night sweats. No associated numbness or tingling. Has been compliant since surgery as recommended. Has not started physical therapy. Presents with his wife today.       Findings/Conclusions: complete tear of the supraspinatus and infraspinatus with delamination, thickening and retraction, with chronic appearance. Grossly intact subscapularis. Type I SLAP tear. Subacromial and subclavicular small osteophytes. Mild glenohumeral chondrosis. Diffuse synovitis. Thickened subacromial bursa. Soft humeral footprint bone quality.       Past Medical History:   Diagnosis Date     Vitamin  B12 deficiency      Vitamin D deficiencies        Past Surgical History:   Procedure Laterality Date     COLONOSCOPY  3/21/2013    Procedure: COLONOSCOPY;  COLONOSCOPY SCREEN;  Surgeon: Walker Thompson MD;  Location:  OR      ESOPHAGOSCOPY, DIAGNOSTIC       SURGICAL HISTORY OF -   1995    Varicose Veins- Stripping      SURGICAL HISTORY OF -   1965    Lt Knee Surgery torn MM        Medications:   Current Outpatient Medications:      ASPIRIN 81 MG PO TABS, 1 TABLET DAILY, Disp: , Rfl:      Ferrous Sulfate (IRON SUPPLEMENT PO), Take 45 mg by mouth twice a week , Disp: , Rfl:      metoprolol succinate (TOPROL-XL) 25 MG 24 hr tablet, Take 0.5 tablets (12.5 mg) by mouth daily, Disp: 45 tablet, Rfl: 3     VITAMIN B-12 PO, Take one tablet every other day, Disp: , Rfl:      VITAMIN D PO, Take one tablet by mouth daily, Disp: , Rfl:     Allergies:   Allergies   Allergen Reactions     Penicillins Hives     Childhood       REVIEW OF SYSTEMS:   CONSTITUTIONAL:NEGATIVE for fever, chills, night sweats  INTEGUMENTARY/SKIN: NEGATIVE for worrisome wound problems or redness.  MUSCULOSKELETAL:See HPI above  NEURO: NEGATIVE for weakness, dizziness or paresthesias    This document serves as a record of the services and decisions personally performed and made by Warren Carl MD. It was created on his behalf by Venus Reeves, a trained medical scribe. The creation of this document is based the provider's statements to the medical scribe.    Estefany Reeves 1:38 PM 1/3/2019       PHYSICAL EXAM:  /76 (BP Location: Left arm, Patient Position: Chair, Cuff Size: Adult Regular)   Ht 1.829 m (6')   Wt 83.5 kg (184 lb)   BMI 24.95 kg/m     GENERAL APPEARANCE: healthy, alert, no distress  SKIN: no suspicious lesions or rashes  NEURO: Normal strength and tone, mentation intact and speech normal  PSYCH:  mentation appears normal and affect normal, not anxious  RESPIRATORY: No increased work of breathing.    LEFT UPPER  EXTREMITY:  Sensation intact to light touch in median, radial, ulnar and axillary nerve distributions  Palpable 2+ radial pulse, brisk capillary refill to all fingers, wwp  Intact epl fpl fdp edc wrist flexion/extension biceps triceps deltoid    LEFT SHOULDER:  Shoulder Inspection: no erythema, resolving ecchymosis into the armpit. Mild swelling.  Tender: nontender to palpation   Range of motion: not assessed  Strength: not tested      Impression: Jaron Carrera is a 73 year old male 2 weeks status post left shoulder arthroscopy and proximal biceps tenodesis, labral debridement and subacromial decompression with partial acromioplasty, doing well.     Plan: routine postoperative shoulder arthroscopy rotator cuff repair, large to massive protocol  * Reviewed surgical photos today.  * suture removal  * WB status: non weightbearing.  * Continue sling for another 6-8 weeks  * Activity: no lifting, pushing, pulling or reaching.  * Rest.  * Ice twice daily to three times daily.  * PT ordered for postoperative rehabilitation, large to massive repair protocol  * wean to Tylenol as needed for pain  * return to clinic in 4 weeks, sooner as needed.    The information in this document, created by a scribe for me, accurately reflects the services I personally performed and the decisions made by me. I have reviewed and approved this document for accuracy.        Warren Carl M.D., M.S.  Dept. of Orthopaedic Surgery  [facility]

## 2019-01-03 NOTE — LETTER
1/3/2019         RE: Jaron Carrera  [address] [place] [address]        Dear Colleague,    Thank you for referring your patient, Jaron Carrera, to the [facility]. Please see a copy of my visit note below.    chief complaint:   Chief Complaint   Patient presents with     Left Shoulder - RECHECK     SURGERY: left shoulder rotator cuff repair, arthroscopic ( [facility] ).  1. Left shoulder arthroscopic rotator cuff tear, large to massive repair, double-row technique.  2. Left shoulder arthroscopic proximal biceps tenodesis  3. Left shoulder arthroscopic labral debridement  4. Left shoulder arthroscopic subacromial decompression with partial acromioplasty     DATE OF SURGERY: 12/18/2018      HISTORY OF PRESENT ILLNESS:  Jaron Carrera is a 73 year old male seen for postoperative evaluation of a left shoulder arthroscopic rotator cuff repair and proximal biceps tenodesis, labral debridement and subacromial decompression with partial acromioplasty for left shoulder rotator cuff tear, large to massive, with retraction and tendinosis, type I SLAP tear and proximal biceps tendinosis, medial subluxation, acromioclavicular arthritis, impingement syndrome. Surgery occurred 2 weeks ago. Returns today stating he is doing OK. Pain has been manageable. Mild today, rated a 3/10. He is no longer on pain medication. Will take Advil as needed for pain. Had severe pain the first 3 days after surgery, but since then has improved. Patient continues to have pain at night. Worsens with sleeping. No problems with the surgical wounds. Denies fevers chills or night sweats. No associated numbness or tingling. Has been compliant since surgery as recommended. Has not started physical therapy. Presents with his wife today.       Findings/Conclusions: complete tear of the supraspinatus and infraspinatus with delamination, thickening and retraction, with chronic appearance. Grossly  intact subscapularis. Type I SLAP tear. Subacromial and subclavicular small osteophytes. Mild glenohumeral chondrosis. Diffuse synovitis. Thickened subacromial bursa. Soft humeral footprint bone quality.       Past Medical History:   Diagnosis Date     Vitamin B12 deficiency      Vitamin D deficiencies        Past Surgical History:   Procedure Laterality Date     COLONOSCOPY  3/21/2013    Procedure: COLONOSCOPY;  COLONOSCOPY SCREEN;  Surgeon: Walker Thompson MD;  Location:  OR      ESOPHAGOSCOPY, DIAGNOSTIC       SURGICAL HISTORY OF -   1995    Varicose Veins- Stripping      SURGICAL HISTORY OF -   1965    Lt Knee Surgery torn MM        Medications:   Current Outpatient Medications:      ASPIRIN 81 MG PO TABS, 1 TABLET DAILY, Disp: , Rfl:      Ferrous Sulfate (IRON SUPPLEMENT PO), Take 45 mg by mouth twice a week , Disp: , Rfl:      metoprolol succinate (TOPROL-XL) 25 MG 24 hr tablet, Take 0.5 tablets (12.5 mg) by mouth daily, Disp: 45 tablet, Rfl: 3     VITAMIN B-12 PO, Take one tablet every other day, Disp: , Rfl:      VITAMIN D PO, Take one tablet by mouth daily, Disp: , Rfl:     Allergies:   Allergies   Allergen Reactions     Penicillins Hives     Childhood       REVIEW OF SYSTEMS:   CONSTITUTIONAL:NEGATIVE for fever, chills, night sweats  INTEGUMENTARY/SKIN: NEGATIVE for worrisome wound problems or redness.  MUSCULOSKELETAL:See HPI above  NEURO: NEGATIVE for weakness, dizziness or paresthesias    This document serves as a record of the services and decisions personally performed and made by Warren Carl MD. It was created on his behalf by Venus Reeves, a trained medical scribe. The creation of this document is based the provider's statements to the medical scribe.    Scribe Venus Reeves 1:38 PM 1/3/2019       PHYSICAL EXAM:  /76 (BP Location: Left arm, Patient Position: Chair, Cuff Size: Adult Regular)   Ht 1.829 m (6')   Wt 83.5 kg (184 lb)   BMI 24.95 kg/m      GENERAL APPEARANCE: healthy,  alert, no distress  SKIN: no suspicious lesions or rashes  NEURO: Normal strength and tone, mentation intact and speech normal  PSYCH:  mentation appears normal and affect normal, not anxious  RESPIRATORY: No increased work of breathing.    LEFT UPPER EXTREMITY:  Sensation intact to light touch in median, radial, ulnar and axillary nerve distributions  Palpable 2+ radial pulse, brisk capillary refill to all fingers, wwp  Intact epl fpl fdp edc wrist flexion/extension biceps triceps deltoid    LEFT SHOULDER:  Shoulder Inspection: no erythema, resolving ecchymosis into the armpit. Mild swelling.  Tender: nontender to palpation   Range of motion: not assessed  Strength: not tested      Impression: Jaron Carrera is a 73 year old male 2 weeks status post left shoulder arthroscopy and proximal biceps tenodesis, labral debridement and subacromial decompression with partial acromioplasty, doing well.     Plan: routine postoperative shoulder arthroscopy rotator cuff repair, large to massive protocol  * Reviewed surgical photos today.  * suture removal  * WB status: non weightbearing.  * Continue sling for another 6-8 weeks  * Activity: no lifting, pushing, pulling or reaching.  * Rest.  * Ice twice daily to three times daily.  * PT ordered for postoperative rehabilitation, large to massive repair protocol  * wean to Tylenol as needed for pain  * return to clinic in 4 weeks, sooner as needed.    The information in this document, created by a scribe for me, accurately reflects the services I personally performed and the decisions made by me. I have reviewed and approved this document for accuracy.        Warren Carl M.D., M.S.  Dept. of Orthopaedic Surgery  [facility]      Again, thank you for allowing me to participate in the care of your patient.        Sincerely,        Warren Carl MD

## 2019-01-10 ENCOUNTER — THERAPY VISIT (OUTPATIENT)
Dept: PHYSICAL THERAPY | Facility: CLINIC | Age: 74
End: 2019-01-10
Payer: COMMERCIAL

## 2019-01-10 DIAGNOSIS — Z98.890 S/P LEFT ROTATOR CUFF REPAIR: ICD-10-CM

## 2019-01-10 DIAGNOSIS — M25.512 ACUTE PAIN OF LEFT SHOULDER: ICD-10-CM

## 2019-01-10 PROCEDURE — 97161 PT EVAL LOW COMPLEX 20 MIN: CPT | Mod: GP | Performed by: PHYSICAL THERAPIST

## 2019-01-10 PROCEDURE — 97110 THERAPEUTIC EXERCISES: CPT | Mod: GP | Performed by: PHYSICAL THERAPIST

## 2019-01-10 NOTE — PROGRESS NOTES
[facility] Initial Evaluation  Subjective:  The history is provided by the patient. No  was used.   Jaron Carrera is a 73 year old male with a left shoulder condition.  Condition occurred with:  A fall.  Condition occurred: at home.  This is a new condition  Fell off his roof 2 months ago while cleaning out leaves.  Thinks it was about an 8 ft fall.  DOS: 12/18/18- massive RCR  .    Patient reports pain:  Posterior and upper trap.    Pain is described as aching and is intermittent and reported as 1/10.  Associated symptoms:  Loss of strength and loss of motion/stiffness. Pain is the same all the time.  Exacerbated by: no AROM allowed at this point. and relieved by ice.  Since onset symptoms are gradually improving.    Previous treatment includes surgery.  Improvement with previous treatment: TBD.  General health as reported by patient is good.  Pertinent medical history includes:  None.  Medical allergies: yes (penicillin).  Other surgeries include:  Orthopedic surgery.  Current medications:  None as reported by the patient.  Current occupation is retired.        Barriers include:  None as reported by the patient.    Red flags:  None as reported by the patient.                        Objective:  Standing Alignment:      Shoulder/upper extremity deviations alignment: arrives wearing sling.                                       Shoulder Evaluation:  ROM:  AROM:    Flexion:  Right:  150    Abduction:  Right:  150      External Rotation:  Right:  65                PROM:    Flexion:  Left:  41                External Rotation:  Left:  -2                        Strength:  : normal strength on R, L not tested.                                                               General     ROS    Assessment/Plan:    Patient is a 73 year old male with left shoulder complaints.    Patient has the following significant findings with corresponding treatment plan.                Diagnosis  1:  S/p L RCR  Pain -  self management, education and home program  Decreased ROM/flexibility - manual therapy, therapeutic exercise and home program  Decreased strength - therapeutic exercise, therapeutic activities and home program  Impaired muscle performance - neuro re-education and home program  Decreased function - therapeutic activities and home program    Therapy Evaluation Codes:   1) History comprised of:   Personal factors that impact the plan of care:      None.    Comorbidity factors that impact the plan of care are:      None.     Medications impacting care: None.  2) Examination of Body Systems comprised of:   Body structures and functions that impact the plan of care:      Shoulder.   Activity limitations that impact the plan of care are:      Bathing, Dressing, Grasping and Lifting.  3) Clinical presentation characteristics are:   Stable/Uncomplicated.  4) Decision-Making    Low complexity using standardized patient assessment instrument and/or measureable assessment of functional outcome.  Cumulative Therapy Evaluation is: Low complexity.    Previous and current functional limitations:  (See Goal Flow Sheet for this information)    Short term and Long term goals: (See Goal Flow Sheet for this information)     Communication ability:  Patient appears to be able to clearly communicate and understand verbal and written communication and follow directions correctly.  Treatment Explanation - The following has been discussed with the patient:   RX ordered/plan of care  Anticipated outcomes  Possible risks and side effects  This patient would benefit from PT intervention to resume normal activities.   Rehab potential is good.    Frequency:  2 X week, once daily  Duration:  for 6 weeks tapering to 1 X a week over 10 weeks  Discharge Plan:  Achieve all LTG.  Independent in home treatment program.  Reach maximal therapeutic benefit.    Please refer to the daily flowsheet for treatment today, total treatment  time and time spent performing 1:1 timed codes.

## 2019-01-14 ENCOUNTER — THERAPY VISIT (OUTPATIENT)
Dept: PHYSICAL THERAPY | Facility: CLINIC | Age: 74
End: 2019-01-14
Payer: COMMERCIAL

## 2019-01-14 DIAGNOSIS — Z98.890 S/P LEFT ROTATOR CUFF REPAIR: ICD-10-CM

## 2019-01-14 DIAGNOSIS — M25.512 ACUTE PAIN OF LEFT SHOULDER: ICD-10-CM

## 2019-01-14 PROCEDURE — 97110 THERAPEUTIC EXERCISES: CPT | Mod: GP | Performed by: PHYSICAL THERAPY ASSISTANT

## 2019-01-17 ENCOUNTER — THERAPY VISIT (OUTPATIENT)
Dept: PHYSICAL THERAPY | Facility: CLINIC | Age: 74
End: 2019-01-17
Payer: COMMERCIAL

## 2019-01-17 DIAGNOSIS — Z98.890 S/P LEFT ROTATOR CUFF REPAIR: ICD-10-CM

## 2019-01-17 DIAGNOSIS — M25.512 ACUTE PAIN OF LEFT SHOULDER: ICD-10-CM

## 2019-01-17 PROCEDURE — 97110 THERAPEUTIC EXERCISES: CPT | Mod: GP | Performed by: PHYSICAL THERAPIST

## 2019-01-17 NOTE — PROGRESS NOTES
[place] [facility] Initial Evaluation  Subjective:  HPI                    Objective:  System    Physical Exam    General     ROS    Assessment/Plan:    SUBJECTIVE  Subjective: Almost no pain except for when he stretches a bit   Current pain level: 1/10     Changes in function:  None     Adverse reaction to treatment or activity:  None    OBJECTIVE  Objective: PROM L shoulder scap: 71, ER: 8     ASSESSMENT  Jaron continues to require intervention to meet STG and LTG's: PT  Patient is progressing as expected.  Response to therapy has shown an improvement in  ROM   Progress made towards STG/LTG?  None    PLAN  Continue current treatment until MD allows progression of the treatment plan.    PTA/ATC plan:  N/A    Please refer to the daily flowsheet for treatment today, total treatment time and time spent performing 1:1 timed codes.

## 2019-01-24 ENCOUNTER — THERAPY VISIT (OUTPATIENT)
Dept: PHYSICAL THERAPY | Facility: CLINIC | Age: 74
End: 2019-01-24
Payer: COMMERCIAL

## 2019-01-24 DIAGNOSIS — M25.512 ACUTE PAIN OF LEFT SHOULDER: ICD-10-CM

## 2019-01-24 DIAGNOSIS — Z98.890 S/P LEFT ROTATOR CUFF REPAIR: ICD-10-CM

## 2019-01-24 PROCEDURE — 97110 THERAPEUTIC EXERCISES: CPT | Mod: GP | Performed by: PHYSICAL THERAPIST

## 2019-01-24 NOTE — PROGRESS NOTES
Subjective:  HPI                    Objective:  System    Physical Exam    General     ROS    Assessment/Plan:    SUBJECTIVE  Subjective: Occasionally gets  alittle bit of a pinch in the front of the shoulder, otherwise doing well   Current pain level: 1/10     Changes in function:  None     Adverse reaction to treatment or activity:  None    OBJECTIVE  Objective: PROM L shoulder scap: 87, ER: 14     ASSESSMENT  Jaron continues to require intervention to meet STG and LTG's: PT  Patient is progressing as expected.  Response to therapy has shown an improvement in  pain level and ROM   Progress made towards STG/LTG?  No goals met to date    PLAN  Continue current treatment until MD allows progression of the treatment plan.    PTA/ATC plan:  N/A    Please refer to the daily flowsheet for treatment today, total treatment time and time spent performing 1:1 timed codes.

## 2019-01-28 ENCOUNTER — THERAPY VISIT (OUTPATIENT)
Dept: PHYSICAL THERAPY | Facility: CLINIC | Age: 74
End: 2019-01-28
Payer: COMMERCIAL

## 2019-01-28 DIAGNOSIS — Z98.890 S/P LEFT ROTATOR CUFF REPAIR: ICD-10-CM

## 2019-01-28 DIAGNOSIS — M25.512 ACUTE PAIN OF LEFT SHOULDER: ICD-10-CM

## 2019-01-28 PROCEDURE — 97110 THERAPEUTIC EXERCISES: CPT | Mod: GP | Performed by: PHYSICAL THERAPIST

## 2019-01-31 ENCOUNTER — THERAPY VISIT (OUTPATIENT)
Dept: PHYSICAL THERAPY | Facility: CLINIC | Age: 74
End: 2019-01-31
Payer: COMMERCIAL

## 2019-01-31 DIAGNOSIS — M25.512 ACUTE PAIN OF LEFT SHOULDER: ICD-10-CM

## 2019-01-31 DIAGNOSIS — Z98.890 S/P LEFT ROTATOR CUFF REPAIR: ICD-10-CM

## 2019-01-31 PROCEDURE — 97110 THERAPEUTIC EXERCISES: CPT | Mod: GP | Performed by: PHYSICAL THERAPIST

## 2019-01-31 NOTE — PROGRESS NOTES
Subjective:  HPI                    Objective:  System    Physical Exam    General     ROS    Assessment/Plan:    PROGRESS  REPORT    Progress reporting period is from 1/10/19 to 1/31/19.       SUBJECTIVE  Subjective: Slowly getting better.  Pain is fine unless he is stretching.  Can't wait to get out of his sling    Current pain level is 1/10  .     Previous pain level was  3/10  .   Changes in function:  None  Adverse reaction to treatment or activity: None    OBJECTIVE  Objective: PROM L shoulder scap: 91, ER: 30.  Gets extremely tight going above 90 deg scaption, probably would have more than 30 ER but held there due to post op restrictions     ASSESSMENT/PLAN  Updated problem list and treatment plan: Diagnosis 1:  S/p RCR  Pain -  self management, education and home program  Decreased ROM/flexibility - manual therapy, therapeutic exercise and home program  Decreased strength - therapeutic exercise, therapeutic activities and home program  Impaired muscle performance - neuro re-education and home program  Decreased function - therapeutic activities and home program  STG/LTGs have been met or progress has been made towards goals:  Yes, progressing toward ROM goal  Assessment of Progress: The patient's condition is improving.  The patient's condition has potential to improve.  Self Management Plans:  Patient has been instructed in a home treatment program.  Jaron continues to require the following intervention to meet STG and LTG's:  PT    Recommendations:  This patient would benefit from continued therapy.     Frequency:  2 X week, once daily  Duration:  for 4 weeks tapering to 1 X a week over 6 weeks        Please refer to the daily flowsheet for treatment today, total treatment time and time spent performing 1:1 timed codes.

## 2019-02-04 ENCOUNTER — OFFICE VISIT (OUTPATIENT)
Dept: ORTHOPEDICS | Facility: CLINIC | Age: 74
End: 2019-02-04
Payer: COMMERCIAL

## 2019-02-04 ENCOUNTER — THERAPY VISIT (OUTPATIENT)
Dept: PHYSICAL THERAPY | Facility: CLINIC | Age: 74
End: 2019-02-04
Payer: COMMERCIAL

## 2019-02-04 VITALS
HEIGHT: 72 IN | BODY MASS INDEX: 25.73 KG/M2 | DIASTOLIC BLOOD PRESSURE: 71 MMHG | WEIGHT: 190 LBS | SYSTOLIC BLOOD PRESSURE: 110 MMHG

## 2019-02-04 DIAGNOSIS — Z98.890 S/P LEFT ROTATOR CUFF REPAIR: Primary | ICD-10-CM

## 2019-02-04 DIAGNOSIS — M25.512 ACUTE PAIN OF LEFT SHOULDER: ICD-10-CM

## 2019-02-04 DIAGNOSIS — Z98.890 S/P LEFT ROTATOR CUFF REPAIR: ICD-10-CM

## 2019-02-04 PROCEDURE — 99024 POSTOP FOLLOW-UP VISIT: CPT | Performed by: ORTHOPAEDIC SURGERY

## 2019-02-04 PROCEDURE — 97110 THERAPEUTIC EXERCISES: CPT | Mod: GP | Performed by: PHYSICAL THERAPY ASSISTANT

## 2019-02-04 ASSESSMENT — PAIN SCALES - GENERAL: PAINLEVEL: NO PAIN (0)

## 2019-02-04 ASSESSMENT — MIFFLIN-ST. JEOR: SCORE: 1639.83

## 2019-02-04 NOTE — PROGRESS NOTES
chief complaint:   Chief Complaint   Patient presents with     Left Shoulder - Surgical Followup     Left shoulder large/massive rotator cuff repair. DOS: 12/18/18. P/o 7 weeks tomorrow. Compliant with wearing sling and doing well with Physical Therapy      SURGERY: left shoulder rotator cuff repair, arthroscopic ( [facility] ).  1. Left shoulder arthroscopic rotator cuff tear, large to massive repair, double-row technique.  2. Left shoulder arthroscopic proximal biceps tenodesis  3. Left shoulder arthroscopic labral debridement  4. Left shoulder arthroscopic subacromial decompression with partial acromioplasty     DATE OF SURGERY: 12/18/2018      HISTORY OF PRESENT ILLNESS:  Jaron Carrera is a 74 year old male seen for postoperative evaluation of a left shoulder arthroscopic rotator cuff repair and proximal biceps tenodesis, labral debridement and subacromial decompression with partial acromioplasty for left shoulder rotator cuff tear, large to massive, with retraction and tendinosis, type I SLAP tear and proximal biceps tendinosis, medial subluxation, acromioclavicular arthritis, impingement syndrome. Surgery occurred 7 weeks ago. Returns today stating he is doing better. Pain has been manageable, rated 0/10. He is no longer on pain medication. Will take Advil as needed for pain. Had severe pain the first 3 days after surgery, but since then has improved.  No problems with the surgical wounds. Denies fevers chills or night sweats. No associated numbness or tingling. Has been compliant since surgery as recommended. Has been going to physical therapy.      Findings/Conclusions: complete tear of the supraspinatus and infraspinatus with delamination, thickening and retraction, with chronic appearance. Grossly intact subscapularis. Type I SLAP tear. Subacromial and subclavicular small osteophytes. Mild glenohumeral chondrosis. Diffuse synovitis. Thickened subacromial bursa. Soft humeral footprint bone  quality.       Past Medical History:   Diagnosis Date     Vitamin B12 deficiency      Vitamin D deficiencies        Past Surgical History:   Procedure Laterality Date     COLONOSCOPY  3/21/2013    Procedure: COLONOSCOPY;  COLONOSCOPY SCREEN;  Surgeon: Walker Thompson MD;  Location:  OR      ESOPHAGOSCOPY, DIAGNOSTIC       SURGICAL HISTORY OF -   1995    Varicose Veins- Stripping      SURGICAL HISTORY OF -   1965    Lt Knee Surgery torn MM        Medications:   Current Outpatient Medications:      ASPIRIN 81 MG PO TABS, 1 TABLET DAILY, Disp: , Rfl:      Ferrous Sulfate (IRON SUPPLEMENT PO), Take 45 mg by mouth twice a week , Disp: , Rfl:      metoprolol succinate (TOPROL-XL) 25 MG 24 hr tablet, Take 0.5 tablets (12.5 mg) by mouth daily, Disp: 45 tablet, Rfl: 3     VITAMIN B-12 PO, Take one tablet every other day, Disp: , Rfl:      VITAMIN D PO, Take one tablet by mouth daily, Disp: , Rfl:     Allergies:   Allergies   Allergen Reactions     Penicillins Hives     Childhood       REVIEW OF SYSTEMS:   CONSTITUTIONAL:NEGATIVE for fever, chills, night sweats  INTEGUMENTARY/SKIN: NEGATIVE for worrisome wound problems or redness.  MUSCULOSKELETAL:See HPI above  NEURO: NEGATIVE for weakness, dizziness or paresthesias        PHYSICAL EXAM:  /71   Ht 1.829 m (6')   Wt 86.2 kg (190 lb)   BMI 25.77 kg/m     GENERAL APPEARANCE: healthy, alert, no distress  SKIN: no suspicious lesions or rashes  NEURO: Normal strength and tone, mentation intact and speech normal  PSYCH:  mentation appears normal and affect normal, not anxious  RESPIRATORY: No increased work of breathing.    LEFT UPPER EXTREMITY:  Sensation intact to light touch in median, radial, ulnar and axillary nerve distributions  Palpable 2+ radial pulse, brisk capillary refill to all fingers, wwp  Intact epl fpl fdp edc wrist flexion/extension biceps triceps deltoid    LEFT SHOULDER:  Shoulder Inspection: no erythema, no swelling. No ecchymosis.  Tender:  mild diffuse.  Range of motion:    Passive forward flexion 90 degrees, external rotation 10 degrees.  Strength: not tested      Impression: Jaron Carrera is a 74 year old male 7 weeks status post left shoulder arthroscopy and proximal biceps tenodesis, labral debridement and subacromial decompression with partial acromioplasty, doing well.     Plan: routine postoperative shoulder arthroscopy rotator cuff repair, large to massive protocol  * WB status: non weightbearing.  * discontinue sling.  * Activity: no lifting, pushing, pulling or reaching.  * Rest.  * Ice twice daily to three times daily.  * PT ordered for postoperative rehabilitation, large to massive repair protocol; aggressive range of motion in all planes.  * wean to Tylenol as needed for pain  * return to clinic in 6 weeks, sooner as needed.        Warren Carl M.D., M.S.  Dept. of Orthopaedic Surgery  [facility]

## 2019-02-04 NOTE — LETTER
2/4/2019         RE: Jaron Carrera  [address] [place] [address]        Dear Colleague,    Thank you for referring your patient, Jaron Carrera, to the [facility]. Please see a copy of my visit note below.    chief complaint:   Chief Complaint   Patient presents with     Left Shoulder - Surgical Followup     Left shoulder large/massive rotator cuff repair. DOS: 12/18/18. P/o 7 weeks tomorrow. Compliant with wearing sling and doing well with Physical Therapy      SURGERY: left shoulder rotator cuff repair, arthroscopic ( [facility] ).  1. Left shoulder arthroscopic rotator cuff tear, large to massive repair, double-row technique.  2. Left shoulder arthroscopic proximal biceps tenodesis  3. Left shoulder arthroscopic labral debridement  4. Left shoulder arthroscopic subacromial decompression with partial acromioplasty     DATE OF SURGERY: 12/18/2018      HISTORY OF PRESENT ILLNESS:  Jaron Carrera is a 74 year old male seen for postoperative evaluation of a left shoulder arthroscopic rotator cuff repair and proximal biceps tenodesis, labral debridement and subacromial decompression with partial acromioplasty for left shoulder rotator cuff tear, large to massive, with retraction and tendinosis, type I SLAP tear and proximal biceps tendinosis, medial subluxation, acromioclavicular arthritis, impingement syndrome. Surgery occurred 7 weeks ago. Returns today stating he is doing better. Pain has been manageable, rated 0/10. He is no longer on pain medication. Will take Advil as needed for pain. Had severe pain the first 3 days after surgery, but since then has improved.  No problems with the surgical wounds. Denies fevers chills or night sweats. No associated numbness or tingling. Has been compliant since surgery as recommended. Has been going to physical therapy.      Findings/Conclusions: complete tear of the supraspinatus and infraspinatus with  delamination, thickening and retraction, with chronic appearance. Grossly intact subscapularis. Type I SLAP tear. Subacromial and subclavicular small osteophytes. Mild glenohumeral chondrosis. Diffuse synovitis. Thickened subacromial bursa. Soft humeral footprint bone quality.       Past Medical History:   Diagnosis Date     Vitamin B12 deficiency      Vitamin D deficiencies        Past Surgical History:   Procedure Laterality Date     COLONOSCOPY  3/21/2013    Procedure: COLONOSCOPY;  COLONOSCOPY SCREEN;  Surgeon: Walker Thompson MD;  Location:  OR      ESOPHAGOSCOPY, DIAGNOSTIC       SURGICAL HISTORY OF -   1995    Varicose Veins- Stripping      SURGICAL HISTORY OF -   1965    Lt Knee Surgery torn MM        Medications:   Current Outpatient Medications:      ASPIRIN 81 MG PO TABS, 1 TABLET DAILY, Disp: , Rfl:      Ferrous Sulfate (IRON SUPPLEMENT PO), Take 45 mg by mouth twice a week , Disp: , Rfl:      metoprolol succinate (TOPROL-XL) 25 MG 24 hr tablet, Take 0.5 tablets (12.5 mg) by mouth daily, Disp: 45 tablet, Rfl: 3     VITAMIN B-12 PO, Take one tablet every other day, Disp: , Rfl:      VITAMIN D PO, Take one tablet by mouth daily, Disp: , Rfl:     Allergies:   Allergies   Allergen Reactions     Penicillins Hives     Childhood       REVIEW OF SYSTEMS:   CONSTITUTIONAL:NEGATIVE for fever, chills, night sweats  INTEGUMENTARY/SKIN: NEGATIVE for worrisome wound problems or redness.  MUSCULOSKELETAL:See HPI above  NEURO: NEGATIVE for weakness, dizziness or paresthesias        PHYSICAL EXAM:  /71   Ht 1.829 m (6')   Wt 86.2 kg (190 lb)   BMI 25.77 kg/m      GENERAL APPEARANCE: healthy, alert, no distress  SKIN: no suspicious lesions or rashes  NEURO: Normal strength and tone, mentation intact and speech normal  PSYCH:  mentation appears normal and affect normal, not anxious  RESPIRATORY: No increased work of breathing.    LEFT UPPER EXTREMITY:  Sensation intact to light touch in median, radial,  ulnar and axillary nerve distributions  Palpable 2+ radial pulse, brisk capillary refill to all fingers, wwp  Intact epl fpl fdp edc wrist flexion/extension biceps triceps deltoid    LEFT SHOULDER:  Shoulder Inspection: no erythema, no swelling. No ecchymosis.  Tender: mild diffuse.  Range of motion:    Passive forward flexion 90 degrees, external rotation 10 degrees.  Strength: not tested      Impression: Jaron Carrera is a 74 year old male 7 weeks status post left shoulder arthroscopy and proximal biceps tenodesis, labral debridement and subacromial decompression with partial acromioplasty, doing well.     Plan: routine postoperative shoulder arthroscopy rotator cuff repair, large to massive protocol  * WB status: non weightbearing.  * discontinue sling.  * Activity: no lifting, pushing, pulling or reaching.  * Rest.  * Ice twice daily to three times daily.  * PT ordered for postoperative rehabilitation, large to massive repair protocol; aggressive range of motion in all planes.  * wean to Tylenol as needed for pain  * return to clinic in 6 weeks, sooner as needed.        Warren Carl M.D., M.S.  Dept. of Orthopaedic Surgery  [facility]      Again, thank you for allowing me to participate in the care of your patient.        Sincerely,        Warren Carl MD

## 2019-02-07 ENCOUNTER — THERAPY VISIT (OUTPATIENT)
Dept: PHYSICAL THERAPY | Facility: CLINIC | Age: 74
End: 2019-02-07
Payer: COMMERCIAL

## 2019-02-07 DIAGNOSIS — M25.512 ACUTE PAIN OF LEFT SHOULDER: ICD-10-CM

## 2019-02-07 DIAGNOSIS — Z98.890 S/P LEFT ROTATOR CUFF REPAIR: ICD-10-CM

## 2019-02-07 PROCEDURE — 97110 THERAPEUTIC EXERCISES: CPT | Mod: GP | Performed by: PHYSICAL THERAPIST

## 2019-02-14 ENCOUNTER — THERAPY VISIT (OUTPATIENT)
Dept: PHYSICAL THERAPY | Facility: CLINIC | Age: 74
End: 2019-02-14
Payer: COMMERCIAL

## 2019-02-14 DIAGNOSIS — M25.512 ACUTE PAIN OF LEFT SHOULDER: ICD-10-CM

## 2019-02-14 DIAGNOSIS — Z98.890 S/P LEFT ROTATOR CUFF REPAIR: ICD-10-CM

## 2019-02-14 PROCEDURE — 97110 THERAPEUTIC EXERCISES: CPT | Mod: GP | Performed by: PHYSICAL THERAPIST

## 2019-02-14 NOTE — PROGRESS NOTES
SUBJECTIVE  Subjective: Wears sling occasionally if he is sore but mostly not in use anymore   Current Pain level: 1/10   Changes in function:  Yes (See Goal flowsheet attached for changes in current functional level)     Adverse reaction to treatment or activity:  None    OBJECTIVE  Objective: PROM L shoulder: Flex: 127, ABD: 109, ER: 36     ASSESSMENT  Jaron continues to require intervention to meet STG and LTG's: PT  Patient is progressing as expected.  Response to therapy has shown an improvement in  pain level and ROM   Progress made towards STG/LTG?  Yes (See Goal flowsheet attached for updates on achievement of STG and LTG)    PLAN  Current treatment program is being advanced to more complex exercises.    PTA/ATC plan:  N/A    Please refer to the daily flowsheet for treatment today, total treatment time and time spent performing 1:1 timed codes.

## 2019-02-21 ENCOUNTER — THERAPY VISIT (OUTPATIENT)
Dept: PHYSICAL THERAPY | Facility: CLINIC | Age: 74
End: 2019-02-21
Payer: COMMERCIAL

## 2019-02-21 DIAGNOSIS — Z98.890 S/P LEFT ROTATOR CUFF REPAIR: ICD-10-CM

## 2019-02-21 DIAGNOSIS — M25.512 ACUTE PAIN OF LEFT SHOULDER: ICD-10-CM

## 2019-02-21 PROCEDURE — 97110 THERAPEUTIC EXERCISES: CPT | Mod: GP | Performed by: PHYSICAL THERAPIST

## 2019-02-27 ENCOUNTER — OFFICE VISIT (OUTPATIENT)
Dept: FAMILY MEDICINE | Facility: CLINIC | Age: 74
End: 2019-02-27
Payer: COMMERCIAL

## 2019-02-27 VITALS
SYSTOLIC BLOOD PRESSURE: 127 MMHG | TEMPERATURE: 97.7 F | RESPIRATION RATE: 16 BRPM | BODY MASS INDEX: 25.77 KG/M2 | WEIGHT: 190 LBS | HEART RATE: 78 BPM | OXYGEN SATURATION: 97 % | DIASTOLIC BLOOD PRESSURE: 77 MMHG

## 2019-02-27 DIAGNOSIS — L03.039 ACUTE PARONYCHIA OF TOE, UNSPECIFIED LATERALITY: Primary | ICD-10-CM

## 2019-02-27 PROCEDURE — 99213 OFFICE O/P EST LOW 20 MIN: CPT | Performed by: PHYSICIAN ASSISTANT

## 2019-02-27 RX ORDER — CEPHALEXIN 500 MG/1
500 CAPSULE ORAL 2 TIMES DAILY
Qty: 14 CAPSULE | Refills: 0 | Status: SHIPPED | OUTPATIENT
Start: 2019-02-27 | End: 2019-06-14

## 2019-02-27 NOTE — PROGRESS NOTES
SUBJECTIVE:   Jaron Carrera is a 74 year old male who presents to clinic today for the following health issues:      Ingrown toenail-bilateral ingrown toenails great toe. Some swelling and redness. Foot soaks daily with relief. No drainage or bleeding.        Problem list and histories reviewed & adjusted, as indicated.  Additional history: as documented    BP Readings from Last 3 Encounters:   02/27/19 127/77   02/04/19 110/71   01/03/19 124/76    Wt Readings from Last 3 Encounters:   02/27/19 86.2 kg (190 lb)   02/04/19 86.2 kg (190 lb)   01/03/19 83.5 kg (184 lb)                    Reviewed and updated as needed this visit by clinical staff  Tobacco  Allergies  Meds  Med Hx  Surg Hx  Fam Hx  Soc Hx      Reviewed and updated as needed this visit by Provider         All other systems negative except as outline above  OBJECTIVE:  Mild paronychial infection involving both great toes.    Jaron was seen today for ingrown toenail.    Diagnoses and all orders for this visit:    Acute paronychia of toe, unspecified laterality  -     cephALEXin (KEFLEX) 500 MG capsule; Take 1 capsule (500 mg) by mouth 2 times daily for 7 days      Advised supportive and symptomatic treatment.  Follow up with Provider - if condition persists or worsens.

## 2019-02-28 ENCOUNTER — THERAPY VISIT (OUTPATIENT)
Dept: PHYSICAL THERAPY | Facility: CLINIC | Age: 74
End: 2019-02-28
Payer: COMMERCIAL

## 2019-02-28 DIAGNOSIS — Z98.890 S/P LEFT ROTATOR CUFF REPAIR: ICD-10-CM

## 2019-02-28 DIAGNOSIS — M25.512 ACUTE PAIN OF LEFT SHOULDER: ICD-10-CM

## 2019-02-28 PROCEDURE — 97110 THERAPEUTIC EXERCISES: CPT | Mod: GP | Performed by: PHYSICAL THERAPIST

## 2019-02-28 NOTE — PROGRESS NOTES
SUBJECTIVE  Subjective changes as noted by pt:  Shoulder is constantly stiff and never seems to loosen up.  Only gets sore when he stretches because it's so stiff     Current pain level: 1/10     Changes in function:  Yes (See Goal flowsheet attached for changes in current functional level)     Adverse reaction to treatment or activity:  None    OBJECTIVE  Changes in objective findings:  PROM L shoulder: 131, ABD: 111, ER: 39, IR: 62        ASSESSMENT  Jaron continues to require intervention to meet STG and LTG's: PT  Progressing slower than expected.  Minimal change in PROM over the last 2 weeks with significant capsular restrictions  Response to therapy has shown an improvement in  pain level  Progress made towards STG/LTG?  Yes (See Goal flowsheet attached for updates on achievement of STG and LTG)    PLAN  Current treatment program is being advanced to more complex exercises.    PTA/ATC plan:  N/A    Please refer to the daily flowsheet for treatment today, total treatment time and time spent performing 1:1 timed codes.

## 2019-03-07 ENCOUNTER — THERAPY VISIT (OUTPATIENT)
Dept: PHYSICAL THERAPY | Facility: CLINIC | Age: 74
End: 2019-03-07
Payer: COMMERCIAL

## 2019-03-07 DIAGNOSIS — M25.512 ACUTE PAIN OF LEFT SHOULDER: ICD-10-CM

## 2019-03-07 DIAGNOSIS — Z98.890 S/P LEFT ROTATOR CUFF REPAIR: ICD-10-CM

## 2019-03-07 PROCEDURE — 97110 THERAPEUTIC EXERCISES: CPT | Mod: GP | Performed by: PHYSICAL THERAPIST

## 2019-03-14 ENCOUNTER — THERAPY VISIT (OUTPATIENT)
Dept: PHYSICAL THERAPY | Facility: CLINIC | Age: 74
End: 2019-03-14
Payer: COMMERCIAL

## 2019-03-14 DIAGNOSIS — M25.512 ACUTE PAIN OF LEFT SHOULDER: ICD-10-CM

## 2019-03-14 DIAGNOSIS — Z98.890 S/P LEFT ROTATOR CUFF REPAIR: ICD-10-CM

## 2019-03-14 PROCEDURE — 97110 THERAPEUTIC EXERCISES: CPT | Mod: GP | Performed by: PHYSICAL THERAPIST

## 2019-03-14 NOTE — PROGRESS NOTES
SUBJECTIVE  Subjective: Can tell the shoulder is getting looser.  Just wishes he had more strength to reach up with   Current Pain level: 1/10   Changes in function:  Yes (See Goal flowsheet attached for changes in current functional level)     Adverse reaction to treatment or activity:  None    OBJECTIVE  Objective: Standing AROM L shoulder flex: 105, ABD: 93, ER: 36, IR: to T11     ASSESSMENT  Jaron continues to require intervention to meet STG and LTG's: PT  Patient is progressing as expected.  Response to therapy has shown an improvement in  pain level, ROM  and strength  Progress made towards STG/LTG?  Yes (See Goal flowsheet attached for updates on achievement of STG and LTG)    PLAN  Current treatment program is being advanced to more complex exercises.    PTA/ATC plan:  N/A    Please refer to the daily flowsheet for treatment today, total treatment time and time spent performing 1:1 timed codes.

## 2019-03-19 ENCOUNTER — THERAPY VISIT (OUTPATIENT)
Dept: PHYSICAL THERAPY | Facility: CLINIC | Age: 74
End: 2019-03-19
Payer: COMMERCIAL

## 2019-03-19 DIAGNOSIS — Z98.890 S/P LEFT ROTATOR CUFF REPAIR: ICD-10-CM

## 2019-03-19 DIAGNOSIS — M25.512 ACUTE PAIN OF LEFT SHOULDER: ICD-10-CM

## 2019-03-19 PROCEDURE — 97110 THERAPEUTIC EXERCISES: CPT | Mod: GP | Performed by: PHYSICAL THERAPIST

## 2019-03-19 PROCEDURE — 97112 NEUROMUSCULAR REEDUCATION: CPT | Mod: GP | Performed by: PHYSICAL THERAPIST

## 2019-03-25 ENCOUNTER — OFFICE VISIT (OUTPATIENT)
Dept: ORTHOPEDICS | Facility: CLINIC | Age: 74
End: 2019-03-25
Payer: COMMERCIAL

## 2019-03-25 VITALS
WEIGHT: 191 LBS | RESPIRATION RATE: 16 BRPM | SYSTOLIC BLOOD PRESSURE: 127 MMHG | DIASTOLIC BLOOD PRESSURE: 67 MMHG | BODY MASS INDEX: 25.87 KG/M2 | HEIGHT: 72 IN

## 2019-03-25 DIAGNOSIS — Z98.890 S/P LEFT ROTATOR CUFF REPAIR: Primary | ICD-10-CM

## 2019-03-25 PROCEDURE — 99213 OFFICE O/P EST LOW 20 MIN: CPT | Performed by: ORTHOPAEDIC SURGERY

## 2019-03-25 ASSESSMENT — PAIN SCALES - GENERAL: PAINLEVEL: NO PAIN (0)

## 2019-03-25 ASSESSMENT — MIFFLIN-ST. JEOR: SCORE: 1644.37

## 2019-03-25 NOTE — LETTER
3/25/2019         RE: Jaron Carrera  [address] [address]        Dear Colleague,    Thank you for referring your patient, Jaron Carrera, to the [facility]. Please see a copy of my visit note below.    chief complaint:   Chief Complaint   Patient presents with     Left Shoulder - Surgical Followup     Left shoulder large rotator cuff repair, biceps tenodesis. DOS: 12/18/18. 14 weeks post-op     Surgical Followup     Doing well. ROM is coming along, has not started lifting anything yet.     SURGERY: left shoulder rotator cuff repair, arthroscopic ( [facility] ).  1. Left shoulder arthroscopic rotator cuff tear, large to massive repair, double-row technique.  2. Left shoulder arthroscopic proximal biceps tenodesis  3. Left shoulder arthroscopic labral debridement  4. Left shoulder arthroscopic subacromial decompression with partial acromioplasty     DATE OF SURGERY: 12/18/2018      HISTORY OF PRESENT ILLNESS:  Jaron Carrera is a 74 year old male seen for postoperative evaluation of a left shoulder arthroscopic rotator cuff repair and proximal biceps tenodesis, labral debridement and subacromial decompression with partial acromioplasty for left shoulder rotator cuff tear, large to massive, with retraction and tendinosis, type I SLAP tear and proximal biceps tendinosis, medial subluxation, acromioclavicular arthritis, impingement syndrome. Surgery occurred 3 months ago. Returns today stating he is doing better. No pain. Going to Physical Therapy with improved range of motion notably the past few weeks.  No problems with the surgical wounds. Denies fevers chills or night sweats. No associated numbness or tingling. Has been compliant since surgery as recommended. Has been going to physical therapy. Hasn't started any strengthening yet or any lifting.      Findings/Conclusions: complete tear of the supraspinatus and infraspinatus with delamination,  thickening and retraction, with chronic appearance. Grossly intact subscapularis. Type I SLAP tear. Subacromial and subclavicular small osteophytes. Mild glenohumeral chondrosis. Diffuse synovitis. Thickened subacromial bursa. Soft humeral footprint bone quality.       Past Medical History:   Diagnosis Date     Vitamin B12 deficiency      Vitamin D deficiencies        Past Surgical History:   Procedure Laterality Date     COLONOSCOPY  3/21/2013    Procedure: COLONOSCOPY;  COLONOSCOPY SCREEN;  Surgeon: Walker Thompson MD;  Location:  OR      ESOPHAGOSCOPY, DIAGNOSTIC       SURGICAL HISTORY OF -   1995    Varicose Veins- Stripping      SURGICAL HISTORY OF -   1965    Lt Knee Surgery torn MM        Medications:   Current Outpatient Medications:      ASPIRIN 81 MG PO TABS, 1 TABLET DAILY, Disp: , Rfl:      Ferrous Sulfate (IRON SUPPLEMENT PO), Take 45 mg by mouth twice a week , Disp: , Rfl:      metoprolol succinate (TOPROL-XL) 25 MG 24 hr tablet, Take 0.5 tablets (12.5 mg) by mouth daily, Disp: 45 tablet, Rfl: 3     VITAMIN B-12 PO, Take one tablet every other day, Disp: , Rfl:      VITAMIN D PO, Take one tablet by mouth daily, Disp: , Rfl:     Allergies:   Allergies   Allergen Reactions     Penicillins Hives     Childhood       REVIEW OF SYSTEMS:   CONSTITUTIONAL:NEGATIVE for fever, chills, night sweats  INTEGUMENTARY/SKIN: NEGATIVE for worrisome wound problems or redness.  MUSCULOSKELETAL:See HPI above  NEURO: NEGATIVE for weakness, dizziness or paresthesias        PHYSICAL EXAM:  /67   Resp 16   Ht 1.829 m (6')   Wt 86.6 kg (191 lb)   BMI 25.90 kg/m      GENERAL APPEARANCE: healthy, alert, no distress  SKIN: no suspicious lesions or rashes  NEURO: Normal strength and tone, mentation intact and speech normal  PSYCH:  mentation appears normal and affect normal, not anxious  RESPIRATORY: No increased work of breathing.    LEFT UPPER EXTREMITY:  Sensation intact to light touch in median, radial,  ulnar and axillary nerve distributions  Palpable 2+ radial pulse, brisk capillary refill to all fingers, wwp  Intact epl fpl fdp edc wrist flexion/extension biceps triceps deltoid    LEFT SHOULDER:  Shoulder Inspection: no erythema, no swelling. No ecchymosis.  Tender: mild diffuse.  Range of motion:    Active forward flexion 110 degrees, external rotation 10 degrees, internal rotation T12.   Passive forward flexion 120 degrees, external rotation 30 degrees  Strength: 4/5      Impression: Jaron Carrera is a 74 year old male 3 months status post left shoulder arthroscopy and proximal biceps tenodesis, labral debridement and subacromial decompression with partial acromioplasty, doing well.     Plan: routine postoperative shoulder arthroscopy rotator cuff repair, large to massive protocol  * WB status: light weight bearing, ADLs  * aggressive range of motion no restrictions on range of motion.  * Activity: no lifting>1lb, pushing, pulling or reaching.  * Rest.  * Ice twice daily to three times daily.  * PT ordered for postoperative rehabilitation, large to massive repair protocol; aggressive range of motion in all planes. Start light resistance.  * wean to Tylenol as needed for pain  * return to clinic in 8 weeks, sooner as needed.        Warren Carl M.D., M.S.  Dept. of Orthopaedic Surgery  [facility]      Again, thank you for allowing me to participate in the care of your patient.        Sincerely,        Warren Carl MD

## 2019-03-25 NOTE — PROGRESS NOTES
chief complaint:   Chief Complaint   Patient presents with     Left Shoulder - Surgical Followup     Left shoulder large rotator cuff repair, biceps tenodesis. DOS: 12/18/18. 14 weeks post-op     Surgical Followup     Doing well. ROM is coming along, has not started lifting anything yet.     SURGERY: left shoulder rotator cuff repair, arthroscopic ( [facility] ).  1. Left shoulder arthroscopic rotator cuff tear, large to massive repair, double-row technique.  2. Left shoulder arthroscopic proximal biceps tenodesis  3. Left shoulder arthroscopic labral debridement  4. Left shoulder arthroscopic subacromial decompression with partial acromioplasty     DATE OF SURGERY: 12/18/2018      HISTORY OF PRESENT ILLNESS:  Jaron Carrera is a 74 year old male seen for postoperative evaluation of a left shoulder arthroscopic rotator cuff repair and proximal biceps tenodesis, labral debridement and subacromial decompression with partial acromioplasty for left shoulder rotator cuff tear, large to massive, with retraction and tendinosis, type I SLAP tear and proximal biceps tendinosis, medial subluxation, acromioclavicular arthritis, impingement syndrome. Surgery occurred 3 months ago. Returns today stating he is doing better. No pain. Going to Physical Therapy with improved range of motion notably the past few weeks.  No problems with the surgical wounds. Denies fevers chills or night sweats. No associated numbness or tingling. Has been compliant since surgery as recommended. Has been going to physical therapy. Hasn't started any strengthening yet or any lifting.      Findings/Conclusions: complete tear of the supraspinatus and infraspinatus with delamination, thickening and retraction, with chronic appearance. Grossly intact subscapularis. Type I SLAP tear. Subacromial and subclavicular small osteophytes. Mild glenohumeral chondrosis. Diffuse synovitis. Thickened subacromial bursa. Soft humeral footprint bone  quality.       Past Medical History:   Diagnosis Date     Vitamin B12 deficiency      Vitamin D deficiencies        Past Surgical History:   Procedure Laterality Date     COLONOSCOPY  3/21/2013    Procedure: COLONOSCOPY;  COLONOSCOPY SCREEN;  Surgeon: Walker Thompson MD;  Location:  OR      ESOPHAGOSCOPY, DIAGNOSTIC       SURGICAL HISTORY OF -   1995    Varicose Veins- Stripping      SURGICAL HISTORY OF -   1965    Lt Knee Surgery torn MM        Medications:   Current Outpatient Medications:      ASPIRIN 81 MG PO TABS, 1 TABLET DAILY, Disp: , Rfl:      Ferrous Sulfate (IRON SUPPLEMENT PO), Take 45 mg by mouth twice a week , Disp: , Rfl:      metoprolol succinate (TOPROL-XL) 25 MG 24 hr tablet, Take 0.5 tablets (12.5 mg) by mouth daily, Disp: 45 tablet, Rfl: 3     VITAMIN B-12 PO, Take one tablet every other day, Disp: , Rfl:      VITAMIN D PO, Take one tablet by mouth daily, Disp: , Rfl:     Allergies:   Allergies   Allergen Reactions     Penicillins Hives     Childhood       REVIEW OF SYSTEMS:   CONSTITUTIONAL:NEGATIVE for fever, chills, night sweats  INTEGUMENTARY/SKIN: NEGATIVE for worrisome wound problems or redness.  MUSCULOSKELETAL:See HPI above  NEURO: NEGATIVE for weakness, dizziness or paresthesias        PHYSICAL EXAM:  /67   Resp 16   Ht 1.829 m (6')   Wt 86.6 kg (191 lb)   BMI 25.90 kg/m     GENERAL APPEARANCE: healthy, alert, no distress  SKIN: no suspicious lesions or rashes  NEURO: Normal strength and tone, mentation intact and speech normal  PSYCH:  mentation appears normal and affect normal, not anxious  RESPIRATORY: No increased work of breathing.    LEFT UPPER EXTREMITY:  Sensation intact to light touch in median, radial, ulnar and axillary nerve distributions  Palpable 2+ radial pulse, brisk capillary refill to all fingers, wwp  Intact epl fpl fdp edc wrist flexion/extension biceps triceps deltoid    LEFT SHOULDER:  Shoulder Inspection: no erythema, no swelling. No  ecchymosis.  Tender: mild diffuse.  Range of motion:    Active forward flexion 110 degrees, external rotation 10 degrees, internal rotation T12.   Passive forward flexion 120 degrees, external rotation 30 degrees  Strength: 4/5      Impression: Jaron Carrera is a 74 year old male 3 months status post left shoulder arthroscopy and proximal biceps tenodesis, labral debridement and subacromial decompression with partial acromioplasty, doing well.     Plan: routine postoperative shoulder arthroscopy rotator cuff repair, large to massive protocol  * WB status: light weight bearing, ADLs  * aggressive range of motion no restrictions on range of motion.  * Activity: no lifting>1lb, pushing, pulling or reaching.  * Rest.  * Ice twice daily to three times daily.  * PT ordered for postoperative rehabilitation, large to massive repair protocol; aggressive range of motion in all planes. Start light resistance.  * wean to Tylenol as needed for pain  * return to clinic in 8 weeks, sooner as needed.        Warren Carl M.D., M.S.  Dept. of Orthopaedic Surgery  [facility]

## 2019-03-28 ENCOUNTER — THERAPY VISIT (OUTPATIENT)
Dept: PHYSICAL THERAPY | Facility: CLINIC | Age: 74
End: 2019-03-28
Payer: COMMERCIAL

## 2019-03-28 DIAGNOSIS — D47.2 MGUS (MONOCLONAL GAMMOPATHY OF UNKNOWN SIGNIFICANCE): ICD-10-CM

## 2019-03-28 DIAGNOSIS — Z98.890 S/P LEFT ROTATOR CUFF REPAIR: ICD-10-CM

## 2019-03-28 DIAGNOSIS — M25.512 ACUTE PAIN OF LEFT SHOULDER: ICD-10-CM

## 2019-03-28 LAB
ANION GAP SERPL CALCULATED.3IONS-SCNC: 7 MMOL/L (ref 3–14)
BUN SERPL-MCNC: 21 MG/DL (ref 7–30)
CALCIUM SERPL-MCNC: 9.3 MG/DL (ref 8.5–10.1)
CHLORIDE SERPL-SCNC: 104 MMOL/L (ref 94–109)
CO2 SERPL-SCNC: 26 MMOL/L (ref 20–32)
CREAT SERPL-MCNC: 1.06 MG/DL (ref 0.66–1.25)
ERYTHROCYTE [DISTWIDTH] IN BLOOD BY AUTOMATED COUNT: 13 % (ref 10–15)
GFR SERPL CREATININE-BSD FRML MDRD: 69 ML/MIN/{1.73_M2}
GLUCOSE SERPL-MCNC: 81 MG/DL (ref 70–99)
HCT VFR BLD AUTO: 46.1 % (ref 40–53)
HGB BLD-MCNC: 15.5 G/DL (ref 13.3–17.7)
MCH RBC QN AUTO: 31.3 PG (ref 26.5–33)
MCHC RBC AUTO-ENTMCNC: 33.6 G/DL (ref 31.5–36.5)
MCV RBC AUTO: 93 FL (ref 78–100)
PLATELET # BLD AUTO: 200 10E9/L (ref 150–450)
POTASSIUM SERPL-SCNC: 4.3 MMOL/L (ref 3.4–5.3)
RBC # BLD AUTO: 4.95 10E12/L (ref 4.4–5.9)
SODIUM SERPL-SCNC: 137 MMOL/L (ref 133–144)
WBC # BLD AUTO: 8.2 10E9/L (ref 4–11)

## 2019-03-28 PROCEDURE — 84165 PROTEIN E-PHORESIS SERUM: CPT | Performed by: INTERNAL MEDICINE

## 2019-03-28 PROCEDURE — 84166 PROTEIN E-PHORESIS/URINE/CSF: CPT | Performed by: INTERNAL MEDICINE

## 2019-03-28 PROCEDURE — 83883 ASSAY NEPHELOMETRY NOT SPEC: CPT | Mod: 59 | Performed by: INTERNAL MEDICINE

## 2019-03-28 PROCEDURE — 97110 THERAPEUTIC EXERCISES: CPT | Mod: GP | Performed by: PHYSICAL THERAPIST

## 2019-03-28 PROCEDURE — 86334 IMMUNOFIX E-PHORESIS SERUM: CPT | Performed by: INTERNAL MEDICINE

## 2019-03-28 PROCEDURE — 85027 COMPLETE CBC AUTOMATED: CPT | Performed by: INTERNAL MEDICINE

## 2019-03-28 PROCEDURE — 80048 BASIC METABOLIC PNL TOTAL CA: CPT | Performed by: INTERNAL MEDICINE

## 2019-03-28 PROCEDURE — 00000402 ZZHCL STATISTIC TOTAL PROTEIN: Performed by: INTERNAL MEDICINE

## 2019-03-28 PROCEDURE — 36415 COLL VENOUS BLD VENIPUNCTURE: CPT | Performed by: INTERNAL MEDICINE

## 2019-03-28 PROCEDURE — 97112 NEUROMUSCULAR REEDUCATION: CPT | Mod: GP | Performed by: PHYSICAL THERAPIST

## 2019-03-28 PROCEDURE — 82784 ASSAY IGA/IGD/IGG/IGM EACH: CPT | Performed by: INTERNAL MEDICINE

## 2019-03-28 PROCEDURE — 83883 ASSAY NEPHELOMETRY NOT SPEC: CPT | Performed by: INTERNAL MEDICINE

## 2019-03-28 PROCEDURE — 86335 IMMUNFIX E-PHORSIS/URINE/CSF: CPT | Performed by: INTERNAL MEDICINE

## 2019-03-28 NOTE — PROGRESS NOTES
PROGRESS  REPORT    Progress reporting period is from 1/10/19 to 3/28/19.       SUBJECTIVE  Subjective: Saw his surgeon and got the OK for light strengthening.  Pt states he feels he is reaching higher with greater ease in the last 2 weeks and feels he is turning a corner    Current Pain level: 1/10.     Initial Pain level: 3/10.   Changes in function:  Yes (See Goal flowsheet attached for changes in current functional level)  Adverse reaction to treatment or activity: None    OBJECTIVE  Objective: AROM L shoulder flex: 129- starts to shrug at that point.  Tolerated addition of 6 oz to AROM exercises but fatigued as expected     ASSESSMENT/PLAN  Updated problem list and treatment plan: Diagnosis 1:  S/p RCR (large protocol)  Pain -  self management, education and home program  Decreased ROM/flexibility - manual therapy, therapeutic exercise and home program  Decreased strength - therapeutic exercise, therapeutic activities and home program  Impaired muscle performance - neuro re-education and home program  STG/LTGs have been met or progress has been made towards goals:  Yes (See Goal flow sheet completed today.)  Assessment of Progress: The patient's condition is improving.  Patient is meeting short term goals and is progressing towards long term goals.  Self Management Plans:  Patient is independent in a home treatment program.  Jaron continues to require the following intervention to meet STG and LTG's:  PT    Recommendations:  This patient would benefit from continued therapy.     Frequency:  1 X week, once daily  Duration:  for 4-6 weeks        Please refer to the daily flowsheet for treatment today, total treatment time and time spent performing 1:1 timed codes.

## 2019-03-29 LAB
ALBUMIN SERPL ELPH-MCNC: 4.2 G/DL (ref 3.7–5.1)
ALPHA1 GLOB SERPL ELPH-MCNC: 0.2 G/DL (ref 0.2–0.4)
ALPHA2 GLOB SERPL ELPH-MCNC: 1 G/DL (ref 0.5–0.9)
B-GLOBULIN SERPL ELPH-MCNC: 0.7 G/DL (ref 0.6–1)
GAMMA GLOB SERPL ELPH-MCNC: 1.6 G/DL (ref 0.7–1.6)
KAPPA LC UR-MCNC: 8.51 MG/DL (ref 0.33–1.94)
KAPPA LC/LAMBDA SER: 6.5 {RATIO} (ref 0.26–1.65)
LAMBDA LC SERPL-MCNC: 1.31 MG/DL (ref 0.57–2.63)
M PROTEIN SERPL ELPH-MCNC: 1.1 G/DL
PROT PATTERN SERPL ELPH-IMP: ABNORMAL
PROT PATTERN UR ELPH-IMP: NORMAL

## 2019-04-01 LAB
IGA SERPL-MCNC: 119 MG/DL (ref 70–380)
IGG SERPL-MCNC: 1660 MG/DL (ref 695–1620)
IGM SERPL-MCNC: 65 MG/DL (ref 60–265)
PROT ELPH PNL UR ELPH: NORMAL
PROT PATTERN SERPL IFE-IMP: ABNORMAL

## 2019-04-08 ENCOUNTER — ONCOLOGY VISIT (OUTPATIENT)
Dept: ONCOLOGY | Facility: CLINIC | Age: 74
End: 2019-04-08
Attending: INTERNAL MEDICINE
Payer: COMMERCIAL

## 2019-04-08 VITALS
TEMPERATURE: 97.9 F | HEIGHT: 72 IN | HEART RATE: 78 BPM | RESPIRATION RATE: 16 BRPM | SYSTOLIC BLOOD PRESSURE: 123 MMHG | DIASTOLIC BLOOD PRESSURE: 76 MMHG | OXYGEN SATURATION: 98 % | WEIGHT: 189.56 LBS | BODY MASS INDEX: 25.67 KG/M2

## 2019-04-08 DIAGNOSIS — D47.2 MGUS (MONOCLONAL GAMMOPATHY OF UNKNOWN SIGNIFICANCE): ICD-10-CM

## 2019-04-08 PROCEDURE — 99213 OFFICE O/P EST LOW 20 MIN: CPT | Performed by: INTERNAL MEDICINE

## 2019-04-08 ASSESSMENT — MIFFLIN-ST. JEOR: SCORE: 1637.98

## 2019-04-08 ASSESSMENT — PAIN SCALES - GENERAL: PAINLEVEL: NO PAIN (0)

## 2019-04-08 NOTE — NURSING NOTE
"Oncology Rooming Note    April 8, 2019 11:43 AM   Jaron Carrera is a 74 year old male who presents for:    Chief Complaint   Patient presents with     Oncology Clinic Visit     4 month follow up     Initial Vitals: /76 (BP Location: Left arm)   Pulse 78   Temp 97.9  F (36.6  C) (Oral)   Resp 16   Ht 1.829 m (6' 0.01\")   Wt 86 kg (189 lb 9 oz)   SpO2 98%   BMI 25.70 kg/m   Estimated body mass index is 25.7 kg/m  as calculated from the following:    Height as of this encounter: 1.829 m (6' 0.01\").    Weight as of this encounter: 86 kg (189 lb 9 oz). Body surface area is 2.09 meters squared.  No Pain (0) Comment: Data Unavailable   No LMP for male patient.  Allergies reviewed: Yes  Medications reviewed: Yes    Medications: Medication refills not needed today.  Pharmacy name entered into [employer]: [id number] [place] [place] - [address]      Dee Grady LPN            "

## 2019-04-08 NOTE — Clinical Note
4/8/2019         RE: Jaron Carrera  [address] [place] [id number]        Dear Colleague,    Thank you for referring your patient, Jaron Carrera, to the [facility]. Please see a copy of my visit note below.      FOLLOW-UP VISIT NOTE    PATIENT NAME: Jaron Carrera MRN # [id number]  DATE OF VISIT: Apr 8, 2019 YOB: 1945    REFERRING PROVIDER: Cirilo Spain PA-C  [id number] [address] [place] [address]    Reason for follow up   Monoclonal gammopathy    HISTORY:  73-year-old male with no significant past medical history who in April 2018 presented to primary care provider with complaints of fatigue. He was noted to have mild anemia was further workup was ordered including protein electrophoresis which revealed monoclonal paraprotein along with elevated ferritin and CRP. Subsequently serum immunofixation and urine immunofixation were requested which revealed 1.3 g/ dl IgG kappa paraprotein with IgG 1880 mg/dl.    05/21/18 negative for evidence of end organ damage( CRAB criteria)  bone marrow biopsy showing 2-3% plasma cells. 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    SUBJECTIVE     Patient is in clinic today for routine follow-up and review recent labs. No new complaints. . Denies weight loss, fever/chills, bone pain abdominal pain, diarrhea, nausea/vomiting or any other complaints      PAST MEDICAL HISTORY     Past Medical History:   Diagnosis Date     Vitamin B12 deficiency      Vitamin D deficiencies          CURRENT OUTPATIENT MEDICATIONS     Current Outpatient Medications   Medication Sig Dispense Refill     ASPIRIN 81 MG PO TABS 1 TABLET DAILY       Ferrous Sulfate (IRON SUPPLEMENT PO) Take 45 mg by mouth twice a week        metoprolol succinate (TOPROL-XL) 25 MG 24 hr tablet Take 0.5 tablets (12.5 mg) by mouth daily 45 tablet 3     VITAMIN B-12 PO Take one  tablet every other day       VITAMIN D PO Take one tablet by mouth daily          ALLERGIES     Allergies   Allergen Reactions     Penicillins Hives     Childhood        REVIEW OF SYSTEMS   As above in the HPI, o/w complete 12-point ROS was negative.     PHYSICAL EXAM   B/P: 117/71, T: 97.7, P: 98, R: 18  SpO2 Readings from Last 4 Encounters:   06/11/18 98%   05/22/18 97%   05/21/18 98%   05/11/18 99%     Wt Readings from Last 3 Encounters:   04/08/19 86 kg (189 lb 9 oz)   03/25/19 86.6 kg (191 lb)   02/27/19 86.2 kg (190 lb)     GEN: NAD  EYES:PERRLA  Mouth/ENT: Oropharynx is clear.  LUNGS: clear bilaterally  CV: regular, no murmurs, rubs, or gallops  ABDOMEN: soft, non-tendern  EXT: warm, well perfused, no edema  NEURO: alert  SKIN: no rashes     LABORATORY AND IMAGING STUDIES     Lab Results   Component Value Date    WBC 8.2 03/28/2019     Lab Results   Component Value Date    RBC 4.95 03/28/2019     Lab Results   Component Value Date    HGB 15.5 03/28/2019     Lab Results   Component Value Date    HCT 46.1 03/28/2019     No components found for: MCT  Lab Results   Component Value Date    MCV 93 03/28/2019     Lab Results   Component Value Date    MCH 31.3 03/28/2019     Lab Results   Component Value Date    MCHC 33.6 03/28/2019     Lab Results   Component Value Date    RDW 13.0 03/28/2019     Lab Results   Component Value Date     03/28/2019     Recent Labs   Lab Test 03/28/19  0857 11/21/18  1147    138   POTASSIUM 4.3 4.1   CHLORIDE 104 105   CO2 26 27   ANIONGAP 7 6   GLC 81 93   BUN 21 18   CR 1.06 0.90   BLAINE 9.3 8.8     Component      Latest Ref Rng & Units 3/28/2019   Albumin Fraction      3.7 - 5.1 g/dL 4.2   Alpha 1 Fraction      0.2 - 0.4 g/dL 0.2   Alpha 2 Fraction      0.5 - 0.9 g/dL 1.0 (H)   Beta Fraction      0.6 - 1.0 g/dL 0.7   Gamma Fraction      0.7 - 1.6 g/dL 1.6   Monoclonal Peak      0.0 g/dL 1.1 (H)   ELP Interpretation:       Monoclonal protein (1.1 g/dL) seen in the gamma  fraction.  See immunofixation report on . . .   Immunofixation ELP       (Note)   IGG      695 - 1,620 mg/dL 1,660 (H)   IGA      70 - 380 mg/dL 119   IGM      60 - 265 mg/dL 65   [place] Free Lt Chain      0.33 - 1.94 mg/dL 8.51 (H)   Lambda Free Lt Chain      0.57 - 2.63 mg/dL 1.31   Kappa Lambda Ratio      0.26 - 1.65 6.50 (H)   ELP Interpretation Urine       Albumin and globulins seen.  A monoclonal protein (25%) is seen in the gamma fraction. . . .      ASSESSMENT AND PLAN     74 year-old male with workup of fatigue and mild anemia of inflammation in April 2018  was noted to have a monoclonal IgG Kappa paraprotein.     Monoclonal paraproteinemia    Clinical impression consistent with monoclonal gammopathy of uncertain clinical significance of  IgG Kappa type   Low Intermediate risk given abnormal free light chain ratio with a 21 % risk of progression to multiple myeloma over 20 years.  Lab results reviewed with patient today. Shows slightly decreased monoclonal protein in the serum, kappa light chains in serum. Patient was informed that he continues to be negative for evidence of end organ damage( CRAB criteria) with previous bone marrow biopsy showing 2-3% plasma cells  Continue with surveillance    - Anemia of inflammation vs iron deficiency  Currently on oral iron supplementation daily        RTC in 1 year for follow-up with labs    Chart documentation with Dragon Voice recognition Software. Although reviewed after completion, some words and grammatical errors may remain.  Rj Leary MD  Attending Physician   Hematology/Medical Oncology    Again, thank you for allowing me to participate in the care of your patient.        Sincerely,        Rj Leary MD

## 2019-04-08 NOTE — PROGRESS NOTES
FOLLOW-UP VISIT NOTE    PATIENT NAME: Jaron Carrera MRN # [id number]  DATE OF VISIT: Apr 8, 2019 YOB: 1945    REFERRING PROVIDER: Cirilo Spain PA-C  [id number] [place] [address]    Reason for follow up   Monoclonal gammopathy    HISTORY:  73-year-old male with no significant past medical history who in April 2018 presented to primary care provider with complaints of fatigue. He was noted to have mild anemia was further workup was ordered including protein electrophoresis which revealed monoclonal paraprotein along with elevated ferritin and CRP. Subsequently serum immunofixation and urine immunofixation were requested which revealed 1.3 g/ dl IgG kappa paraprotein with IgG 1880 mg/dl.    05/21/18 negative for evidence of end organ damage( CRAB criteria)  bone marrow biopsy showing 2-3% plasma cells. 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    SUBJECTIVE     Patient is in clinic today for routine follow-up and review recent labs. No new complaints. . Denies weight loss, fever/chills, bone pain abdominal pain, diarrhea, nausea/vomiting or any other complaints      PAST MEDICAL HISTORY     Past Medical History:   Diagnosis Date     Vitamin B12 deficiency      Vitamin D deficiencies          CURRENT OUTPATIENT MEDICATIONS     Current Outpatient Medications   Medication Sig Dispense Refill     ASPIRIN 81 MG PO TABS 1 TABLET DAILY       Ferrous Sulfate (IRON SUPPLEMENT PO) Take 45 mg by mouth twice a week        metoprolol succinate (TOPROL-XL) 25 MG 24 hr tablet Take 0.5 tablets (12.5 mg) by mouth daily 45 tablet 3     VITAMIN B-12 PO Take one tablet every other day       VITAMIN D PO Take one tablet by mouth daily          ALLERGIES     Allergies   Allergen Reactions     Penicillins Hives     Childhood        REVIEW OF SYSTEMS   As above in the HPI, o/w complete 12-point ROS was  negative.     PHYSICAL EXAM   B/P: 117/71, T: 97.7, P: 98, R: 18  SpO2 Readings from Last 4 Encounters:   06/11/18 98%   05/22/18 97%   05/21/18 98%   05/11/18 99%     Wt Readings from Last 3 Encounters:   04/08/19 86 kg (189 lb 9 oz)   03/25/19 86.6 kg (191 lb)   02/27/19 86.2 kg (190 lb)     GEN: NAD  EYES:PERRLA  Mouth/ENT: Oropharynx is clear.  LUNGS: clear bilaterally  CV: regular, no murmurs, rubs, or gallops  ABDOMEN: soft, non-tendern  EXT: warm, well perfused, no edema  NEURO: alert  SKIN: no rashes     LABORATORY AND IMAGING STUDIES     Lab Results   Component Value Date    WBC 8.2 03/28/2019     Lab Results   Component Value Date    RBC 4.95 03/28/2019     Lab Results   Component Value Date    HGB 15.5 03/28/2019     Lab Results   Component Value Date    HCT 46.1 03/28/2019     No components found for: MCT  Lab Results   Component Value Date    MCV 93 03/28/2019     Lab Results   Component Value Date    MCH 31.3 03/28/2019     Lab Results   Component Value Date    MCHC 33.6 03/28/2019     Lab Results   Component Value Date    RDW 13.0 03/28/2019     Lab Results   Component Value Date     03/28/2019     Recent Labs   Lab Test 03/28/19  0857 11/21/18  1147    138   POTASSIUM 4.3 4.1   CHLORIDE 104 105   CO2 26 27   ANIONGAP 7 6   GLC 81 93   BUN 21 18   CR 1.06 0.90   BLAINE 9.3 8.8     Component      Latest Ref Rng & Units 3/28/2019   Albumin Fraction      3.7 - 5.1 g/dL 4.2   Alpha 1 Fraction      0.2 - 0.4 g/dL 0.2   Alpha 2 Fraction      0.5 - 0.9 g/dL 1.0 (H)   Beta Fraction      0.6 - 1.0 g/dL 0.7   Gamma Fraction      0.7 - 1.6 g/dL 1.6   Monoclonal Peak      0.0 g/dL 1.1 (H)   ELP Interpretation:       Monoclonal protein (1.1 g/dL) seen in the gamma fraction.  See immunofixation report on . . .   Immunofixation ELP       (Note)   IGG      695 - 1,620 mg/dL 1,660 (H)   IGA      70 - 380 mg/dL 119   IGM      60 - 265 mg/dL 65   [place] Free Lt Chain      0.33 - 1.94 mg/dL 8.51 (H)   Lambda  Free Lt Chain      0.57 - 2.63 mg/dL 1.31   Kappa Lambda Ratio      0.26 - 1.65 6.50 (H)   ELP Interpretation Urine       Albumin and globulins seen.  A monoclonal protein (25%) is seen in the gamma fraction. . . .      ASSESSMENT AND PLAN     74 year-old male with workup of fatigue and mild anemia of inflammation in April 2018  was noted to have a monoclonal IgG Kappa paraprotein.     Monoclonal paraproteinemia    Clinical impression consistent with monoclonal gammopathy of uncertain clinical significance of  IgG Kappa type   Low Intermediate risk given abnormal free light chain ratio with a 21 % risk of progression to multiple myeloma over 20 years.  Lab results reviewed with patient today. Shows slightly decreased monoclonal protein in the serum, kappa light chains in serum. Patient was informed that he continues to be negative for evidence of end organ damage( CRAB criteria) with previous bone marrow biopsy showing 2-3% plasma cells  Continue with surveillance    - Anemia of inflammation vs iron deficiency  Currently on oral iron supplementation daily        RTC in 1 year for follow-up with labs    Chart documentation with Dragon Voice recognition Software. Although reviewed after completion, some words and grammatical errors may remain.  Rj Leary MD  Attending Physician   Hematology/Medical Oncology

## 2019-04-22 ENCOUNTER — THERAPY VISIT (OUTPATIENT)
Dept: PHYSICAL THERAPY | Facility: CLINIC | Age: 74
End: 2019-04-22
Payer: COMMERCIAL

## 2019-04-22 DIAGNOSIS — Z98.890 S/P LEFT ROTATOR CUFF REPAIR: ICD-10-CM

## 2019-04-22 DIAGNOSIS — M25.512 ACUTE PAIN OF LEFT SHOULDER: ICD-10-CM

## 2019-04-22 PROCEDURE — 97112 NEUROMUSCULAR REEDUCATION: CPT | Mod: GP | Performed by: PHYSICAL THERAPIST

## 2019-04-22 PROCEDURE — 97110 THERAPEUTIC EXERCISES: CPT | Mod: GP | Performed by: PHYSICAL THERAPIST

## 2019-04-22 NOTE — PROGRESS NOTES
SUBJECTIVE  Subjective: Feeling much better about his arm the last few weeks.  Can tell he is getting stronger and able to reach into cupboards easier   Current Pain level: 0/10   Changes in function:  Yes (See Goal flowsheet attached for changes in current functional level)     Adverse reaction to treatment or activity:  None    OBJECTIVE  Objective: AROM L shoulder flex: 123, ABD: 110, ER: 34, IR: T12     ASSESSMENT  Jaron continues to require intervention to meet STG and LTG's: PT  Patient is progressing slowly but taking into consideration the size of the repair this is as expected.  Response to therapy has shown an improvement in  pain level, ROM  and strength  Progress made towards STG/LTG?  Yes (See Goal flowsheet attached for updates on achievement of STG and LTG)    PLAN  Current treatment program is being advanced to more complex exercises.    PTA/ATC plan:  N/A    Please refer to the daily flowsheet for treatment today, total treatment time and time spent performing 1:1 timed codes.

## 2019-05-02 ENCOUNTER — THERAPY VISIT (OUTPATIENT)
Dept: PHYSICAL THERAPY | Facility: CLINIC | Age: 74
End: 2019-05-02
Payer: COMMERCIAL

## 2019-05-02 DIAGNOSIS — M25.512 ACUTE PAIN OF LEFT SHOULDER: ICD-10-CM

## 2019-05-02 DIAGNOSIS — Z98.890 S/P LEFT ROTATOR CUFF REPAIR: ICD-10-CM

## 2019-05-02 PROCEDURE — 97110 THERAPEUTIC EXERCISES: CPT | Mod: GP | Performed by: PHYSICAL THERAPIST

## 2019-05-02 PROCEDURE — 97112 NEUROMUSCULAR REEDUCATION: CPT | Mod: GP | Performed by: PHYSICAL THERAPIST

## 2019-05-02 NOTE — PROGRESS NOTES
SUBJECTIVE  Subjective changes as noted by pt:  Shoulder is getting stronger week by week.  Weakest point is overhead of course     Current Pain level: 0/10   Changes in function:  Yes (See Goal flowsheet attached for changes in current functional level)     Adverse reaction to treatment or activity:  None    OBJECTIVE  Objective: AROM L shoulder flex: 128, ABD: 116, ER: 49     ASSESSMENT  Jaron continues to require intervention to meet STG and LTG's: PT  Patient is progressing as expected.  Response to therapy has shown an improvement in  ROM  and strength  Progress made towards STG/LTG?  Yes (See Goal flowsheet attached for updates on achievement of STG and LTG)    PLAN  Current treatment program is being advanced to more complex exercises.    PTA/ATC plan:  N/A    Please refer to the daily flowsheet for treatment today, total treatment time and time spent performing 1:1 timed codes.

## 2019-05-21 ENCOUNTER — THERAPY VISIT (OUTPATIENT)
Dept: PHYSICAL THERAPY | Facility: CLINIC | Age: 74
End: 2019-05-21
Payer: COMMERCIAL

## 2019-05-21 DIAGNOSIS — Z98.890 S/P LEFT ROTATOR CUFF REPAIR: ICD-10-CM

## 2019-05-21 DIAGNOSIS — M25.512 ACUTE PAIN OF LEFT SHOULDER: ICD-10-CM

## 2019-05-21 PROCEDURE — 97530 THERAPEUTIC ACTIVITIES: CPT | Mod: GP | Performed by: PHYSICAL THERAPIST

## 2019-05-21 PROCEDURE — 97110 THERAPEUTIC EXERCISES: CPT | Mod: GP | Performed by: PHYSICAL THERAPIST

## 2019-05-21 PROCEDURE — 97112 NEUROMUSCULAR REEDUCATION: CPT | Mod: GP | Performed by: PHYSICAL THERAPIST

## 2019-05-21 NOTE — PROGRESS NOTES
DISCHARGE REPORT    Progress reporting period is from 1/10/19 to 5/21/19.       SUBJECTIVE  Subjective: Never has pain in shoulder, just stiffness.  Gradually getting stronger and figuring out what he can do with it    Current Pain level: 0/10.     Initial Pain level: 3/10.   Changes in function:  Yes (See Goal flowsheet attached for changes in current functional level)  Adverse reaction to treatment or activity: None    OBJECTIVE  Objective: AROM L shoulder flex: 128, ABD: 120, ER: 55, IR: T9.  MMT L shoulder flex: 5/5, ABD: 5-/5, ER: 4+/5, IR: 5/5     ASSESSMENT/PLAN  Updated problem list and treatment plan: Diagnosis 1:  S/p RCR    STG/LTGs have been met or progress has been made towards goals:  Yes (See Goal flow sheet completed today.)  Assessment of Progress: The patient's condition is improving.  The patient has met all of their long term goals.  Self Management Plans:  Patient is independent in a home treatment program.  Jaron continues to require the following intervention to meet STG and LTG's:  PT intervention is no longer required to meet STG/LTG.    Recommendations:  This patient is ready to be discharged from therapy and continue their home treatment program.    Please refer to the daily flowsheet for treatment today, total treatment time and time spent performing 1:1 timed codes.

## 2019-05-30 ENCOUNTER — OFFICE VISIT (OUTPATIENT)
Dept: ORTHOPEDICS | Facility: CLINIC | Age: 74
End: 2019-05-30
Payer: COMMERCIAL

## 2019-05-30 VITALS
WEIGHT: 189.4 LBS | HEART RATE: 84 BPM | BODY MASS INDEX: 25.65 KG/M2 | SYSTOLIC BLOOD PRESSURE: 120 MMHG | RESPIRATION RATE: 16 BRPM | HEIGHT: 72 IN | DIASTOLIC BLOOD PRESSURE: 73 MMHG

## 2019-05-30 DIAGNOSIS — Z98.890 S/P LEFT ROTATOR CUFF REPAIR: Primary | ICD-10-CM

## 2019-05-30 PROCEDURE — 99213 OFFICE O/P EST LOW 20 MIN: CPT | Performed by: ORTHOPAEDIC SURGERY

## 2019-05-30 ASSESSMENT — MIFFLIN-ST. JEOR: SCORE: 1637.11

## 2019-05-30 ASSESSMENT — PAIN SCALES - GENERAL: PAINLEVEL: NO PAIN (0)

## 2019-05-30 NOTE — PROGRESS NOTES
chief complaint:   Chief Complaint   Patient presents with     Left Shoulder - Surgical Followup     Arthroscopy - rotator cuff repair(L-M), subacromial decompression, labral debridement, proximal biceps tenodesis. DOS 12/18/18, 5 mon PO. Patient states his shoulder is coming along. Finished physical therapy. Has better range of motion.      SURGERY: left shoulder rotator cuff repair, arthroscopic ( [facility] ).  1. Left shoulder arthroscopic rotator cuff tear, large to massive repair, double-row technique.  2. Left shoulder arthroscopic proximal biceps tenodesis  3. Left shoulder arthroscopic labral debridement  4. Left shoulder arthroscopic subacromial decompression with partial acromioplasty     DATE OF SURGERY: 12/18/2018      HISTORY OF PRESENT ILLNESS:  Jaron Carrera is a 74 year old male seen for postoperative evaluation of a left shoulder arthroscopic rotator cuff repair and proximal biceps tenodesis, labral debridement and subacromial decompression with partial acromioplasty for left shoulder rotator cuff tear, large to massive, with retraction and tendinosis, type I SLAP tear and proximal biceps tendinosis, medial subluxation, acromioclavicular arthritis, impingement syndrome. Surgery occurred 5.5 months ago. Returns today stating he is doing better. No pain. Has finished formal Physical Therapy with improved range of motion.  No problems with the surgical wounds. Denies fevers chills or night sweats. No associated numbness or tingling. Has been compliant since surgery as recommended.    Surgical Findings/Conclusions: complete tear of the supraspinatus and infraspinatus with delamination, thickening and retraction, with chronic appearance. Grossly intact subscapularis. Type I SLAP tear. Subacromial and subclavicular small osteophytes. Mild glenohumeral chondrosis. Diffuse synovitis. Thickened subacromial bursa. Soft humeral footprint bone quality.       Past Medical History:   Diagnosis Date      Vitamin B12 deficiency      Vitamin D deficiencies        Past Surgical History:   Procedure Laterality Date     COLONOSCOPY  3/21/2013    Procedure: COLONOSCOPY;  COLONOSCOPY SCREEN;  Surgeon: Walker Thompson MD;  Location:  OR      ESOPHAGOSCOPY, DIAGNOSTIC       SURGICAL HISTORY OF -   1995    Varicose Veins- Stripping      SURGICAL HISTORY OF -   1965    Lt Knee Surgery torn MM        Medications:   Current Outpatient Medications:      ASPIRIN 81 MG PO TABS, 1 TABLET DAILY, Disp: , Rfl:      Ferrous Sulfate (IRON SUPPLEMENT PO), Take 45 mg by mouth twice a week , Disp: , Rfl:      metoprolol succinate (TOPROL-XL) 25 MG 24 hr tablet, Take 0.5 tablets (12.5 mg) by mouth daily, Disp: 45 tablet, Rfl: 3     VITAMIN B-12 PO, Take one tablet every other day, Disp: , Rfl:      VITAMIN D PO, Take one tablet by mouth daily, Disp: , Rfl:     Allergies:   Allergies   Allergen Reactions     Penicillins Hives     Childhood       REVIEW OF SYSTEMS:   CONSTITUTIONAL:NEGATIVE for fever, chills, night sweats  INTEGUMENTARY/SKIN: NEGATIVE for worrisome wound problems or redness.  MUSCULOSKELETAL:See HPI above  NEURO: NEGATIVE for weakness, dizziness or paresthesias        PHYSICAL EXAM:  /73   Pulse 84   Resp 16   Ht 1.829 m (6')   Wt 85.9 kg (189 lb 6.4 oz)   BMI 25.69 kg/m     GENERAL APPEARANCE: healthy, alert, no distress  SKIN: no suspicious lesions or rashes  NEURO: Normal strength and tone, mentation intact and speech normal  PSYCH:  mentation appears normal and affect normal, not anxious  RESPIRATORY: No increased work of breathing.    LEFT UPPER EXTREMITY:  Sensation intact to light touch in median, radial, ulnar and axillary nerve distributions  Palpable 2+ radial pulse, brisk capillary refill to all fingers, wwp  Intact epl fpl fdp edc wrist flexion/extension biceps triceps deltoid    LEFT SHOULDER:  Shoulder Inspection: no erythema, no swelling. No ecchymosis.  Tender: none.  Range of motion:  "   Active forward flexion 140 degrees, external rotation 55 degrees, internal rotation T9   Passive forward flexion 150 degrees, external rotation 60 degrees  Strength: 4+/5      Impression: Jaron Carrera is a 74 year old male 5.5  months status post left shoulder arthroscopy and proximal biceps tenodesis, labral debridement and subacromial decompression with partial acromioplasty, doing well.     Plan:  * glad to hear he is doing quite well. Expect continued improvement up to a year or more as long as he continues to work at home exercise program and not \"over do it\" or have any setbacks.  * WB status: light weight bearing, ADLs; again stressed nothing \"too heavy\".  * aggressive range of motion no restrictions on range of motion.  * Rest.  * Ice twice daily to three times daily.    * wean to Tylenol as needed for pain  * return to clinic as needed.        Warren Carl M.D., M.S.  Dept. of Orthopaedic Surgery  [facility]    "

## 2019-05-30 NOTE — LETTER
5/30/2019         RE: Jaron Carrera  [address] [place] [address]        Dear Colleague,    Thank you for referring your patient, Jaron Carrera, to the [facility]. Please see a copy of my visit note below.    chief complaint:   Chief Complaint   Patient presents with     Left Shoulder - Surgical Followup     Arthroscopy - rotator cuff repair(L-M), subacromial decompression, labral debridement, proximal biceps tenodesis. DOS 12/18/18, 5 mon PO. Patient states his shoulder is coming along. Finished physical therapy. Has better range of motion.      SURGERY: left shoulder rotator cuff repair, arthroscopic ( [facility] ).  1. Left shoulder arthroscopic rotator cuff tear, large to massive repair, double-row technique.  2. Left shoulder arthroscopic proximal biceps tenodesis  3. Left shoulder arthroscopic labral debridement  4. Left shoulder arthroscopic subacromial decompression with partial acromioplasty     DATE OF SURGERY: 12/18/2018      HISTORY OF PRESENT ILLNESS:  Jaron Carrera is a 74 year old male seen for postoperative evaluation of a left shoulder arthroscopic rotator cuff repair and proximal biceps tenodesis, labral debridement and subacromial decompression with partial acromioplasty for left shoulder rotator cuff tear, large to massive, with retraction and tendinosis, type I SLAP tear and proximal biceps tendinosis, medial subluxation, acromioclavicular arthritis, impingement syndrome. Surgery occurred 5.5 months ago. Returns today stating he is doing better. No pain. Has finished formal Physical Therapy with improved range of motion.  No problems with the surgical wounds. Denies fevers chills or night sweats. No associated numbness or tingling. Has been compliant since surgery as recommended.    Surgical Findings/Conclusions: complete tear of the supraspinatus and infraspinatus with delamination, thickening and retraction, with chronic  appearance. Grossly intact subscapularis. Type I SLAP tear. Subacromial and subclavicular small osteophytes. Mild glenohumeral chondrosis. Diffuse synovitis. Thickened subacromial bursa. Soft humeral footprint bone quality.       Past Medical History:   Diagnosis Date     Vitamin B12 deficiency      Vitamin D deficiencies        Past Surgical History:   Procedure Laterality Date     COLONOSCOPY  3/21/2013    Procedure: COLONOSCOPY;  COLONOSCOPY SCREEN;  Surgeon: Walker Thompson MD;  Location:  OR      ESOPHAGOSCOPY, DIAGNOSTIC       SURGICAL HISTORY OF -   1995    Varicose Veins- Stripping      SURGICAL HISTORY OF -   1965    Lt Knee Surgery torn MM        Medications:   Current Outpatient Medications:      ASPIRIN 81 MG PO TABS, 1 TABLET DAILY, Disp: , Rfl:      Ferrous Sulfate (IRON SUPPLEMENT PO), Take 45 mg by mouth twice a week , Disp: , Rfl:      metoprolol succinate (TOPROL-XL) 25 MG 24 hr tablet, Take 0.5 tablets (12.5 mg) by mouth daily, Disp: 45 tablet, Rfl: 3     VITAMIN B-12 PO, Take one tablet every other day, Disp: , Rfl:      VITAMIN D PO, Take one tablet by mouth daily, Disp: , Rfl:     Allergies:   Allergies   Allergen Reactions     Penicillins Hives     Childhood       REVIEW OF SYSTEMS:   CONSTITUTIONAL:NEGATIVE for fever, chills, night sweats  INTEGUMENTARY/SKIN: NEGATIVE for worrisome wound problems or redness.  MUSCULOSKELETAL:See HPI above  NEURO: NEGATIVE for weakness, dizziness or paresthesias        PHYSICAL EXAM:  /73   Pulse 84   Resp 16   Ht 1.829 m (6')   Wt 85.9 kg (189 lb 6.4 oz)   BMI 25.69 kg/m      GENERAL APPEARANCE: healthy, alert, no distress  SKIN: no suspicious lesions or rashes  NEURO: Normal strength and tone, mentation intact and speech normal  PSYCH:  mentation appears normal and affect normal, not anxious  RESPIRATORY: No increased work of breathing.    LEFT UPPER EXTREMITY:  Sensation intact to light touch in median, radial, ulnar and axillary  "nerve distributions  Palpable 2+ radial pulse, brisk capillary refill to all fingers, wwp  Intact epl fpl fdp edc wrist flexion/extension biceps triceps deltoid    LEFT SHOULDER:  Shoulder Inspection: no erythema, no swelling. No ecchymosis.  Tender: none.  Range of motion:    Active forward flexion 140 degrees, external rotation 55 degrees, internal rotation T9   Passive forward flexion 150 degrees, external rotation 60 degrees  Strength: 4+/5      Impression: Jaron Carrera is a 74 year old male 5.5  months status post left shoulder arthroscopy and proximal biceps tenodesis, labral debridement and subacromial decompression with partial acromioplasty, doing well.     Plan:  * glad to hear he is doing quite well. Expect continued improvement up to a year or more as long as he continues to work at home exercise program and not \"over do it\" or have any setbacks.  * WB status: light weight bearing, ADLs; again stressed nothing \"too heavy\".  * aggressive range of motion no restrictions on range of motion.  * Rest.  * Ice twice daily to three times daily.    * wean to Tylenol as needed for pain  * return to clinic as needed.        Warren Carl M.D., M.S.  Dept. of Orthopaedic Surgery  [facility]      Again, thank you for allowing me to participate in the care of your patient.        Sincerely,        Warren Carl MD    "

## 2019-06-14 ENCOUNTER — OFFICE VISIT (OUTPATIENT)
Dept: FAMILY MEDICINE | Facility: CLINIC | Age: 74
End: 2019-06-14
Payer: COMMERCIAL

## 2019-06-14 VITALS
DIASTOLIC BLOOD PRESSURE: 72 MMHG | RESPIRATION RATE: 18 BRPM | HEART RATE: 84 BPM | SYSTOLIC BLOOD PRESSURE: 132 MMHG | BODY MASS INDEX: 25.77 KG/M2 | OXYGEN SATURATION: 98 % | TEMPERATURE: 97.6 F | WEIGHT: 190 LBS

## 2019-06-14 DIAGNOSIS — I77.89 AORTIC ROOT ENLARGEMENT (H): Primary | ICD-10-CM

## 2019-06-14 DIAGNOSIS — R68.89 HYPEREMIA: ICD-10-CM

## 2019-06-14 PROCEDURE — 99213 OFFICE O/P EST LOW 20 MIN: CPT | Performed by: PHYSICIAN ASSISTANT

## 2019-06-14 NOTE — PROGRESS NOTES
Subjective     Jaron Carrera is a 74 year old male who presents to clinic today for the following health issues:    HPI   Ingrown toenails-bilateral ingrown toenails. Has taken keflex in the past helps with swelling and redness. No drainage or bleeding.   No pain . No dysesthesias or paresthesias. No claudication symptoms.     BP Readings from Last 3 Encounters:   06/14/19 132/72   05/30/19 120/73   04/08/19 123/76    Wt Readings from Last 3 Encounters:   06/14/19 86.2 kg (190 lb)   05/30/19 85.9 kg (189 lb 6.4 oz)   04/08/19 86 kg (189 lb 9 oz)                      Reviewed and updated as needed this visit by Provider         Review of Systems   ROS COMP: Constitutional, HEENT, cardiovascular, pulmonary, GI, , musculoskeletal, neuro, skin, endocrine and psych systems are negative, except as otherwise noted.      Objective    /72   Pulse 84   Temp 97.6  F (36.4  C) (Tympanic)   Resp 18   Wt 86.2 kg (190 lb)   SpO2 98%   BMI 25.77 kg/m    Body mass index is 25.77 kg/m .  Physical Exam     Mild hyperemia involving ends of toes bilaterally.  Cms of foot and ankle normal.     No ingrown nail. No pain or profound swelling.   CHEST:chest clear to IPPA, no tachypnea, retractions or cyanosis and S1, S2 normal, no murmur, no gallop, rate regular.    Jaron was seen today for ingrown toenail.    Diagnoses and all orders for this visit:    Aortic root enlargement (H)  -     Echocardiogram Complete; Future    Hyperemia      Advised supportive and symptomatic treatment.  Follow up with Provider - if condition persists or worsens.

## 2019-06-24 ENCOUNTER — ANCILLARY PROCEDURE (OUTPATIENT)
Dept: CARDIOLOGY | Facility: CLINIC | Age: 74
End: 2019-06-24
Attending: PHYSICIAN ASSISTANT
Payer: COMMERCIAL

## 2019-06-24 DIAGNOSIS — I77.89 AORTIC ROOT ENLARGEMENT (H): ICD-10-CM

## 2019-06-24 PROCEDURE — 93306 TTE W/DOPPLER COMPLETE: CPT | Mod: GC | Performed by: INTERNAL MEDICINE

## 2019-07-17 ENCOUNTER — TELEPHONE (OUTPATIENT)
Dept: ONCOLOGY | Facility: CLINIC | Age: 74
End: 2019-07-17

## 2019-07-17 NOTE — TELEPHONE ENCOUNTER
I left a message and mailed a letter appointment time and provider change. Provider not in clinic reschedule follow up appointment from Dr Leary to Dr Medel on 04/06/2020.

## 2020-02-04 ENCOUNTER — TELEPHONE (OUTPATIENT)
Dept: FAMILY MEDICINE | Facility: CLINIC | Age: 75
End: 2020-02-04

## 2020-02-04 NOTE — TELEPHONE ENCOUNTER
Panel Management Review    Summary:    Patient is due/failing the following:   Medicare Wellness Exam, Shingles Vaccine    Type of outreach:    Sent [employer] message.                                                                                                                                  Khadijah Rodas [facility]     Chart routed to Care Team .

## 2020-02-24 ENCOUNTER — HEALTH MAINTENANCE LETTER (OUTPATIENT)
Age: 75
End: 2020-02-24

## 2020-03-11 ENCOUNTER — DOCUMENTATION ONLY (OUTPATIENT)
Dept: LAB | Facility: CLINIC | Age: 75
End: 2020-03-11

## 2020-03-11 DIAGNOSIS — Z12.5 SCREENING FOR PROSTATE CANCER: Primary | ICD-10-CM

## 2020-03-11 NOTE — PROGRESS NOTES
Patient has a PV-lab appointment requesting PSA test to be drawn.  Please sign order if necessary. Thank you    Kenisha Hanley MLT  Lab

## 2020-06-30 ENCOUNTER — DOCUMENTATION ONLY (OUTPATIENT)
Dept: LAB | Facility: CLINIC | Age: 75
End: 2020-06-30

## 2020-06-30 DIAGNOSIS — D47.2 MGUS (MONOCLONAL GAMMOPATHY OF UNKNOWN SIGNIFICANCE): Primary | ICD-10-CM

## 2020-06-30 NOTE — PROGRESS NOTES
Pt has a lab appointment on 7.7.20 at 0900 with no orders.  Please place any necessary future orders.  If no orders are not needed please inform patient.      Thank you,  Kenisha Hanley MLT  Lab

## 2020-07-01 ENCOUNTER — CARE COORDINATION (OUTPATIENT)
Dept: ONCOLOGY | Facility: CLINIC | Age: 75
End: 2020-07-01

## 2020-07-01 NOTE — PROGRESS NOTES
Called patient and informed him that Dr. Medel ordered a 24 hour urine test that would need to be collected and turned in a few days prior to his appointment on 7/15. Stated he has collected one before and would  the container at his 7/7 lab appointment in [place] and turn it in the next day. Tracey Maldonado RN

## 2020-07-07 DIAGNOSIS — D47.2 MGUS (MONOCLONAL GAMMOPATHY OF UNKNOWN SIGNIFICANCE): ICD-10-CM

## 2020-07-07 LAB
ALBUMIN SERPL-MCNC: 3.8 G/DL (ref 3.4–5)
ALP SERPL-CCNC: 109 U/L (ref 40–150)
ALT SERPL W P-5'-P-CCNC: 35 U/L (ref 0–70)
ANION GAP SERPL CALCULATED.3IONS-SCNC: 7 MMOL/L (ref 3–14)
AST SERPL W P-5'-P-CCNC: 24 U/L (ref 0–45)
BASOPHILS # BLD AUTO: 0 10E9/L (ref 0–0.2)
BASOPHILS NFR BLD AUTO: 0.3 %
BILIRUB SERPL-MCNC: 0.6 MG/DL (ref 0.2–1.3)
BUN SERPL-MCNC: 22 MG/DL (ref 7–30)
CALCIUM SERPL-MCNC: 9.1 MG/DL (ref 8.5–10.1)
CHLORIDE SERPL-SCNC: 105 MMOL/L (ref 94–109)
CO2 SERPL-SCNC: 27 MMOL/L (ref 20–32)
CREAT SERPL-MCNC: 1.12 MG/DL (ref 0.66–1.25)
DIFFERENTIAL METHOD BLD: NORMAL
EOSINOPHIL # BLD AUTO: 0.1 10E9/L (ref 0–0.7)
EOSINOPHIL NFR BLD AUTO: 1.5 %
ERYTHROCYTE [DISTWIDTH] IN BLOOD BY AUTOMATED COUNT: 12.7 % (ref 10–15)
GFR SERPL CREATININE-BSD FRML MDRD: 64 ML/MIN/{1.73_M2}
GLUCOSE SERPL-MCNC: 85 MG/DL (ref 70–99)
HCT VFR BLD AUTO: 47 % (ref 40–53)
HGB BLD-MCNC: 16 G/DL (ref 13.3–17.7)
LYMPHOCYTES # BLD AUTO: 1.7 10E9/L (ref 0.8–5.3)
LYMPHOCYTES NFR BLD AUTO: 23.1 %
MCH RBC QN AUTO: 32.1 PG (ref 26.5–33)
MCHC RBC AUTO-ENTMCNC: 34 G/DL (ref 31.5–36.5)
MCV RBC AUTO: 94 FL (ref 78–100)
MONOCYTES # BLD AUTO: 0.6 10E9/L (ref 0–1.3)
MONOCYTES NFR BLD AUTO: 8.5 %
NEUTROPHILS # BLD AUTO: 4.8 10E9/L (ref 1.6–8.3)
NEUTROPHILS NFR BLD AUTO: 66.6 %
PLATELET # BLD AUTO: 171 10E9/L (ref 150–450)
POTASSIUM SERPL-SCNC: 4.3 MMOL/L (ref 3.4–5.3)
PROT SERPL-MCNC: 7.8 G/DL (ref 6.8–8.8)
RBC # BLD AUTO: 4.98 10E12/L (ref 4.4–5.9)
SODIUM SERPL-SCNC: 139 MMOL/L (ref 133–144)
WBC # BLD AUTO: 7.2 10E9/L (ref 4–11)

## 2020-07-07 PROCEDURE — 80053 COMPREHEN METABOLIC PANEL: CPT | Performed by: INTERNAL MEDICINE

## 2020-07-07 PROCEDURE — 85025 COMPLETE CBC W/AUTO DIFF WBC: CPT | Performed by: INTERNAL MEDICINE

## 2020-07-07 PROCEDURE — 86334 IMMUNOFIX E-PHORESIS SERUM: CPT | Performed by: INTERNAL MEDICINE

## 2020-07-07 PROCEDURE — 82784 ASSAY IGA/IGD/IGG/IGM EACH: CPT | Performed by: INTERNAL MEDICINE

## 2020-07-07 PROCEDURE — 84165 PROTEIN E-PHORESIS SERUM: CPT | Performed by: INTERNAL MEDICINE

## 2020-07-07 PROCEDURE — 00000402 ZZHCL STATISTIC TOTAL PROTEIN: Performed by: INTERNAL MEDICINE

## 2020-07-07 PROCEDURE — 83883 ASSAY NEPHELOMETRY NOT SPEC: CPT | Performed by: INTERNAL MEDICINE

## 2020-07-07 PROCEDURE — 36415 COLL VENOUS BLD VENIPUNCTURE: CPT | Performed by: INTERNAL MEDICINE

## 2020-07-08 DIAGNOSIS — D47.2 MGUS (MONOCLONAL GAMMOPATHY OF UNKNOWN SIGNIFICANCE): ICD-10-CM

## 2020-07-08 LAB
ALBUMIN SERPL ELPH-MCNC: 4.3 G/DL (ref 3.7–5.1)
ALPHA1 GLOB SERPL ELPH-MCNC: 0.2 G/DL (ref 0.2–0.4)
ALPHA2 GLOB SERPL ELPH-MCNC: 1 G/DL (ref 0.5–0.9)
B-GLOBULIN SERPL ELPH-MCNC: 0.6 G/DL (ref 0.6–1)
GAMMA GLOB SERPL ELPH-MCNC: 1.5 G/DL (ref 0.7–1.6)
IGA SERPL-MCNC: 124 MG/DL (ref 84–499)
IGG SERPL-MCNC: 1615 MG/DL (ref 610–1616)
IGM SERPL-MCNC: 59 MG/DL (ref 35–242)
KAPPA LC UR-MCNC: 7.72 MG/DL (ref 0.33–1.94)
KAPPA LC/LAMBDA SER: 9.53 {RATIO} (ref 0.26–1.65)
LAMBDA LC SERPL-MCNC: 0.81 MG/DL (ref 0.57–2.63)
M PROTEIN SERPL ELPH-MCNC: 1 G/DL
PROT PATTERN SERPL ELPH-IMP: ABNORMAL
PROT PATTERN SERPL IFE-IMP: NORMAL

## 2020-07-08 PROCEDURE — 86335 IMMUNFIX E-PHORSIS/URINE/CSF: CPT | Performed by: INTERNAL MEDICINE

## 2020-07-08 PROCEDURE — 84166 PROTEIN E-PHORESIS/URINE/CSF: CPT | Performed by: INTERNAL MEDICINE

## 2020-07-09 LAB
ALBUMIN MFR UR ELPH: 22 %
ALPHA1 GLOB MFR UR ELPH: 4.7 %
ALPHA2 GLOB MFR UR ELPH: 6.6 %
B-GLOBULIN MFR UR ELPH: 12.1 %
GAMMA GLOB MFR UR ELPH: 54.6 %
M PROTEIN MFR UR ELPH: 14.5 %
PROT PATTERN UR ELPH-IMP: ABNORMAL

## 2020-07-10 LAB — PROT ELPH PNL UR ELPH: NORMAL

## 2020-07-15 ENCOUNTER — VIRTUAL VISIT (OUTPATIENT)
Dept: ONCOLOGY | Facility: CLINIC | Age: 75
End: 2020-07-15
Payer: COMMERCIAL

## 2020-07-15 DIAGNOSIS — D47.2 MGUS (MONOCLONAL GAMMOPATHY OF UNKNOWN SIGNIFICANCE): Primary | ICD-10-CM

## 2020-07-15 PROCEDURE — 99213 OFFICE O/P EST LOW 20 MIN: CPT | Mod: 95 | Performed by: INTERNAL MEDICINE

## 2020-07-15 NOTE — PROGRESS NOTES
FOLLOW-UP VISIT NOTE    PATIENT NAME: Jaron Carrera MRN # [id number]  DATE OF VISIT: Jul 15, 2020 YOB: 1945    REFERRING PROVIDER: Cirilo Spain PA-C  [address] [place] [address]    Reason for follow up   Monoclonal gammopathy    HISTORY:  He was being followed by Dr. Leary but now he has transferred his care to me.  I have reviewed his previous records and have copied and updated from prior notes.    75-year-old male with no significant past medical history who in April 2018 presented to primary care provider with complaints of fatigue. He was noted to have mild anemia was further workup was ordered including protein electrophoresis which revealed monoclonal paraprotein along with elevated ferritin and CRP. Subsequently serum immunofixation and urine immunofixation were requested which revealed 1.3 g/ dl IgG kappa paraprotein with IgG 1880 mg/dl.    05/21/18 negative for evidence of end organ damage( CRAB criteria)  bone marrow biopsy showing 2-3% plasma cells. 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    SUBJECTIVE     This is a video visit.  Overall he is doing well.  He denies any B symptoms.  No new swellings.  No pain.  Energy is good and he exercises regularly.  Denies shortness of breath or infections.  He denies any neuropathy.      ROS:  A comprehensive ROS was otherwise neg        PAST MEDICAL HISTORY     Past Medical History:   Diagnosis Date     Vitamin B12 deficiency      Vitamin D deficiencies          CURRENT OUTPATIENT MEDICATIONS     Current Outpatient Medications   Medication Sig Dispense Refill     ASPIRIN 81 MG PO TABS 1 TABLET DAILY       Ferrous Sulfate (IRON SUPPLEMENT PO) Take 45 mg by mouth twice a week        VITAMIN B-12 PO Take one tablet every other day       VITAMIN D PO Take one tablet by mouth daily       FAMILY HX:  Family History   Problem Relation  Age of Onset     Respiratory Father         COPD     Lipids Brother      Prostate Cancer Brother      Other Cancer Brother      Thyroid Disease Daughter      C.A.D. No family hx of      Cancer - colorectal No family hx of      Cerebrovascular Disease No family hx of      Diabetes No family hx of      1 brother had multiple myeloma- he had exposure to agent orange.  Another brother also has MGUS and he also is on observation.  3 daughters are healthy       ALLERGIES     Allergies   Allergen Reactions     Penicillins Hives     Childhood     SOCIAL HISTORY:  Social History     Socioeconomic History     Marital status:      Spouse name: Alyssa     Number of children: 3     Years of education: Not on file     Highest education level: Not on file   Occupational History     Occupation: certified      Employer: RETIRED   Social Needs     Financial resource strain: Not on file     Food insecurity     Worry: Not on file     Inability: Not on file     Transportation needs     Medical: Not on file     Non-medical: Not on file   Tobacco Use     Smoking status: Former Smoker     Packs/day: 1.00     Years: 2.00     Pack years: 2.00     Types: Cigarettes     Last attempt to quit: 1970     Years since quittin.0     Smokeless tobacco: Never Used   Substance and Sexual Activity     Alcohol use: Yes     Alcohol/week: 1.7 standard drinks     Types: 2 Cans of beer per week     Comment: 1-2 beers weekly     Drug use: No     Sexual activity: Yes     Partners: Female     Birth control/protection: None   Lifestyle     Physical activity     Days per week: Not on file     Minutes per session: Not on file     Stress: Not on file   Relationships     Social connections     Talks on phone: Not on file     Gets together: Not on file     Attends Yarsani service: Not on file     Active member of club or organization: Not on file     Attends meetings of clubs or organizations: Not on file     Relationship status: Not on  file     Intimate partner violence     Fear of current or ex partner: Not on file     Emotionally abused: Not on file     Physically abused: Not on file     Forced sexual activity: Not on file   Other Topics Concern     Parent/sibling w/ CABG, MI or angioplasty before 65F 55M? No   Social History Narrative     Not on file   No smoking. Drinks etoh occasionally.      PHYSICAL EXAM     There were no vitals taken for this visit.  Wt Readings from Last 4 Encounters:   06/14/19 86.2 kg (190 lb)   05/30/19 85.9 kg (189 lb 6.4 oz)   04/08/19 86 kg (189 lb 9 oz)   03/25/19 86.6 kg (191 lb)         Constitutional.  Does not seem to be in any acute distress.  Eyes.  No redness or discharge noted.  Respiratory.  Speaking in full sentences.  Breathing seems comfortable without any accessory use of muscles.    Skin.  Visualized his skin does not show any obvious rashes.  Musculoskeletal.  Range of motion for visualized areas is intact.  Neurological.  Alert and oriented x3.  Psychiatric.  Mood, mentation and affect are normal.  Decision making capacity is intact.      The rest of a comprehensive physical examination is deferred due to Public Health Emergency video visit restrictions.       LABORATORY AND IMAGING STUDIES     Reviewed and stable       ASSESSMENT AND PLAN     75 year-old male with workup of fatigue and mild anemia of inflammation in April 2018  was noted to have a monoclonal IgG Kappa paraprotein.     IgG kappa MGUS.  No evidence of myeloma.  He is on observation.  He is doing well and his labs are stable.  We discussed the situation in great detail today.  I discussed about what MGUS means and there is a small risk of progression into myeloma.  I recommend continued observation and we will repeat labs in 1 year.  I will see him after that.      All questions were answered to his satisfaction.  He is agreeable and comfortable with the plan.    Ana Rosa Medel MD        Video start time. 8:39 AM  Video stop time.  8:54 AM

## 2020-07-15 NOTE — NURSING NOTE
"Jaron Carrera is a 75 year old male who is being evaluated via a billable video visit.      The patient has been notified of following:     \"This video visit will be conducted via a call between you and your physician/provider. We have found that certain health care needs can be provided without the need for an in-person physical exam.  This service lets us provide the care you need with a video conversation.  If a prescription is necessary we can send it directly to your pharmacy.  If lab work is needed we can place an order for that and you can then stop by our lab to have the test done at a later time.    Video visits are billed at different rates depending on your insurance coverage.  Please reach out to your insurance provider with any questions.    If during the course of the call the physician/provider feels a video visit is not appropriate, you will not be charged for this service.\"    Patient has given verbal consent for Video visit? Yes  How would you like to obtain your AVS? Russ  Patient would like the video invitation sent by: [email or web address]  Will anyone else be joining your video visit? No        Video-Visit Details    Type of service:  Video Visit    Originating Location (pt. Location): Home    Distant Location (provider location):  [facility]     Platform used for Video Visit: Jose Brambila CMA        "

## 2020-07-15 NOTE — LETTER
7/15/2020         RE: Jaron Carrera  [address] [place] [address]        Dear Colleague,    Thank you for referring your patient, Jaron Carrera, to the [facility]. Please see a copy of my visit note below.      FOLLOW-UP VISIT NOTE    PATIENT NAME: Jaron Carrera MRN # [id number]  DATE OF VISIT: Jul 15, 2020 YOB: 1945    REFERRING PROVIDER: Cirilo Spain PA-C  [address] [place] [address]    Reason for follow up   Monoclonal gammopathy    HISTORY:  He was being followed by Dr. Leary but now he has transferred his care to me.  I have reviewed his previous records and have copied and updated from prior notes.    75-year-old male with no significant past medical history who in April 2018 presented to primary care provider with complaints of fatigue. He was noted to have mild anemia was further workup was ordered including protein electrophoresis which revealed monoclonal paraprotein along with elevated ferritin and CRP. Subsequently serum immunofixation and urine immunofixation were requested which revealed 1.3 g/ dl IgG kappa paraprotein with IgG 1880 mg/dl.    05/21/18 negative for evidence of end organ damage( CRAB criteria)  bone marrow biopsy showing 2-3% plasma cells. 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    SUBJECTIVE     This is a video visit.  Overall he is doing well.  He denies any B symptoms.  No new swellings.  No pain.  Energy is good and he exercises regularly.  Denies shortness of breath or infections.  He denies any neuropathy.      ROS:  A comprehensive ROS was otherwise neg        PAST MEDICAL HISTORY     Past Medical History:   Diagnosis Date     Vitamin B12 deficiency      Vitamin D deficiencies          CURRENT OUTPATIENT MEDICATIONS     Current Outpatient Medications   Medication Sig Dispense Refill     ASPIRIN 81 MG PO TABS  1 TABLET DAILY       Ferrous Sulfate (IRON SUPPLEMENT PO) Take 45 mg by mouth twice a week        VITAMIN B-12 PO Take one tablet every other day       VITAMIN D PO Take one tablet by mouth daily       FAMILY HX:  Family History   Problem Relation Age of Onset     Respiratory Father         COPD     Lipids Brother      Prostate Cancer Brother      Other Cancer Brother      Thyroid Disease Daughter      C.A.D. No family hx of      Cancer - colorectal No family hx of      Cerebrovascular Disease No family hx of      Diabetes No family hx of      1 brother had multiple myeloma- he had exposure to agent orange.  Another brother also has MGUS and he also is on observation.  3 daughters are healthy       ALLERGIES     Allergies   Allergen Reactions     Penicillins Hives     Childhood     SOCIAL HISTORY:  Social History     Socioeconomic History     Marital status:      Spouse name: Alyssa     Number of children: 3     Years of education: Not on file     Highest education level: Not on file   Occupational History     Occupation: certified      Employer: RETIRED   Social Needs     Financial resource strain: Not on file     Food insecurity     Worry: Not on file     Inability: Not on file     Transportation needs     Medical: Not on file     Non-medical: Not on file   Tobacco Use     Smoking status: Former Smoker     Packs/day: 1.00     Years: 2.00     Pack years: 2.00     Types: Cigarettes     Last attempt to quit: 1970     Years since quittin.0     Smokeless tobacco: Never Used   Substance and Sexual Activity     Alcohol use: Yes     Alcohol/week: 1.7 standard drinks     Types: 2 Cans of beer per week     Comment: 1-2 beers weekly     Drug use: No     Sexual activity: Yes     Partners: Female     Birth control/protection: None   Lifestyle     Physical activity     Days per week: Not on file     Minutes per session: Not on file     Stress: Not on file   Relationships     Social connections      Talks on phone: Not on file     Gets together: Not on file     Attends Spiritism service: Not on file     Active member of club or organization: Not on file     Attends meetings of clubs or organizations: Not on file     Relationship status: Not on file     Intimate partner violence     Fear of current or ex partner: Not on file     Emotionally abused: Not on file     Physically abused: Not on file     Forced sexual activity: Not on file   Other Topics Concern     Parent/sibling w/ CABG, MI or angioplasty before 65F 55M? No   Social History Narrative     Not on file   No smoking. Drinks etoh occasionally.      PHYSICAL EXAM     There were no vitals taken for this visit.  Wt Readings from Last 4 Encounters:   06/14/19 86.2 kg (190 lb)   05/30/19 85.9 kg (189 lb 6.4 oz)   04/08/19 86 kg (189 lb 9 oz)   03/25/19 86.6 kg (191 lb)         Constitutional.  Does not seem to be in any acute distress.  Eyes.  No redness or discharge noted.  Respiratory.  Speaking in full sentences.  Breathing seems comfortable without any accessory use of muscles.    Skin.  Visualized his skin does not show any obvious rashes.  Musculoskeletal.  Range of motion for visualized areas is intact.  Neurological.  Alert and oriented x3.  Psychiatric.  Mood, mentation and affect are normal.  Decision making capacity is intact.      The rest of a comprehensive physical examination is deferred due to Public Health Emergency video visit restrictions.       LABORATORY AND IMAGING STUDIES     Reviewed and stable       ASSESSMENT AND PLAN     75 year-old male with workup of fatigue and mild anemia of inflammation in April 2018  was noted to have a monoclonal IgG Kappa paraprotein.     IgG kappa MGUS.  No evidence of myeloma.  He is on observation.  He is doing well and his labs are stable.  We discussed the situation in great detail today.  I discussed about what MGUS means and there is a small risk of progression into myeloma.  I recommend continued  observation and we will repeat labs in 1 year.  I will see him after that.      All questions were answered to his satisfaction.  He is agreeable and comfortable with the plan.    Ana Rosa Medel MD        Video start time. 8:39 AM  Video stop time. 8:54 AM    Again, thank you for allowing me to participate in the care of your patient.        Sincerely,        Ana Rosa Medel MD

## 2020-08-17 NOTE — ED PROVIDER NOTES
History     Chief Complaint   Patient presents with     Fall     about 8 feet off roof; did not hit head, no LOC. mostly left sided pain     HPI  Jaron Carrera is a 73 year old male who presents the emergency department complaining of left-sided back pain flank pain, left arm pain and left leg pain status post fall.  Patient was on his roof of his garage blowing leaves with a leaf blower when he lost his balance and fell off the rough landing on his left side.  He did not hit his head or lose consciousness.  He has pain in his upper left thoracic region and flank regions with pain worsened with movement and deep breathing.  He also has pain in his left humeral region and left femur region.  He does not have any neck pain denies any anterior chest pain.  He is not short of breath.  Denies abdominal pain he has no focal numbness weakness in extremity.  Really rates his pain a 4 out of 10.    Problem List:    Patient Active Problem List    Diagnosis Date Noted     MGUS (monoclonal gammopathy of unknown significance) 06/11/2018     Priority: Medium     Screening for prostate cancer 09/30/2014     Priority: Medium     Family History: older brother diagnosed at 70 with Prostate cancer- now receiving radiation treatment       Pure hyperglyceridemia 09/17/2013     Priority: Medium     Advanced directives, counseling/discussion 12/13/2012     Priority: Medium     9/30/2014  Discussed with pt.  Wishes to be Full code.  Has a living will, has assigned wife Jayla Carrera as his MPOA.    10/27/2015  - reviewed with pt- remains Full Code, no changes.  Living will scanned into Media tab- from 2013.             CARDIOVASCULAR SCREENING; LDL GOAL LESS THAN 160 10/31/2010     Priority: Medium     Vitamin D deficiency      Priority: Medium     (Problem list name updated by automated process. Provider to review and confirm.)       Vitamin B12 deficiency      Priority: Medium        Past Medical History:    Past Medical History:  Patient is calling regarding the message below, please contact patient regarding this at [phone]       Diagnosis Date     Vitamin B12 deficiency      Vitamin D deficiencies        Past Surgical History:    Past Surgical History:   Procedure Laterality Date     COLONOSCOPY  3/21/2013    Procedure: COLONOSCOPY;  COLONOSCOPY SCREEN;  Surgeon: Walker Thompson MD;  Location:  OR      ESOPHAGOSCOPY, DIAGNOSTIC       SURGICAL HISTORY OF -   1995    Varicose Veins- Stripping      SURGICAL HISTORY OF -   1965    Lt Knee Surgery torn MM        Family History:    Family History   Problem Relation Age of Onset     Respiratory Father      COPD     Lipids Brother      Prostate Cancer Brother      Other Cancer Brother      Thyroid Disease Daughter      C.A.D. No family hx of      Cancer - colorectal No family hx of      Cerebrovascular Disease No family hx of      Diabetes No family hx of        Social History:  Marital Status:   [2]  Social History   Substance Use Topics     Smoking status: Former Smoker     Packs/day: 1.00     Years: 2.00     Types: Cigarettes     Quit date: 7/20/1970     Smokeless tobacco: Never Used     Alcohol use 1.0 oz/week     2 Cans of beer per week      Comment: 1-2 beers weekly        Medications:      ASPIRIN 81 MG PO TABS   Ferrous Sulfate (IRON SUPPLEMENT PO)   metoprolol succinate (TOPROL-XL) 25 MG 24 hr tablet   tamsulosin (FLOMAX) 0.4 MG capsule   valACYclovir (VALTREX) 1000 mg tablet   VITAMIN B-12 PO   VITAMIN D PO         Review of Systems   Constitutional: Positive for activity change. Negative for fever.   HENT: Negative for trouble swallowing.    Respiratory: Positive for shortness of breath. Negative for chest tightness and wheezing.    Cardiovascular: Negative for chest pain and leg swelling.   Gastrointestinal: Negative for nausea and vomiting.   Genitourinary: Positive for flank pain. Negative for decreased urine volume and hematuria.   Musculoskeletal: Positive for arthralgias, back pain, gait problem and myalgias. Negative for joint swelling.   Skin: Positive for  wound. Negative for rash.   Neurological: Negative for syncope, weakness, light-headedness, numbness and headaches.   Psychiatric/Behavioral: Negative for confusion.       Physical Exam   BP: (!) 143/112  Heart Rate: 94  Temp: 97.4  F (36.3  C)  Resp: 16  Height: 182.9 cm (6')  Weight: 79.4 kg (175 lb)  SpO2: 97 %      Physical Exam   Constitutional: He is oriented to person, place, and time. He appears well-developed and well-nourished. No distress.   HENT:   Head: Normocephalic and atraumatic.   Mouth/Throat: Oropharynx is clear and moist.   Eyes: EOM are normal. Pupils are equal, round, and reactive to light.   Neck: Normal range of motion. Neck supple.   No posterior  Neck tenderness.    Cardiovascular: Normal rate, regular rhythm, normal heart sounds and intact distal pulses.    No murmur heard.  Pulmonary/Chest: Effort normal. He has no wheezes. He has no rales.   Breath sounds are slightly decreased at bases left greater than right tenderness to palpation of the left lower posterior thoracic region.  No crepitance erythema or bruising is noted.   Abdominal: Soft. Bowel sounds are normal. He exhibits no distension. There is no tenderness.   Musculoskeletal: Normal range of motion. He exhibits no edema.   No midline back tenderness.  There is tenderness to palpation of the left thoracic back region and flank region.  Moving all extremities well.  There is abrasion noted to the left posterior humerus region with mild swelling and tenderness to palpation.  There is no left elbow tenderness patient is moving the wrist well and moves the shoulder without difficulty pulses and sensation in upper extremities are symmetrical.  There is an abrasion on the left thigh region with mild swelling and tenderness to palpation in this region.  Patient is able to raise legs off the bed without difficulty.  No knee tenderness he is able flex and extend the knees without difficulty no calf tenderness.  Good plantarflexion and  dorsiflexion of the ankles.  Pulses and sensation are symmetrical.   Neurological: He is alert and oriented to person, place, and time. No cranial nerve deficit. He exhibits normal muscle tone. Coordination normal.   Skin: Skin is warm and dry. No rash noted.   Psychiatric: He has a normal mood and affect.   Nursing note and vitals reviewed.      ED Course     ED Course     Procedures               Critical Care time:  none               Results for orders placed or performed during the hospital encounter of 11/16/18   CT Chest/Abdomen/Pelvis w Contrast     Value    Radiologist flags Small left pneumothorax. (AA)    Narrative    CT CHEST, ABDOMEN, AND PELVIS WITH CONTRAST  11/16/2018 2:50 PM    HISTORY: Left thoracic and left flank pain status post fall.     COMPARISON: None.    TECHNIQUE: Routine transverse CT imaging of the chest, abdomen, and  pelvis was performed following the uneventful administration of Isovue  370, 85 mL intravenous contrast. Radiation dose for this scan was  reduced using automated exposure control, adjustment of the mA and/or  kV according to patient size, or iterative reconstruction technique.    FINDINGS:     Chest: The heart size is normal.No enlarged lymph node or other  abnormal mediastinal mass is seen. There is calcification within the  thoracic aorta and coronary arteries. There is mild dependent  atelectasis of both lungs. The lungs are otherwise clear. There is a  small left pneumothorax over the left lung apex and to a much lesser  degree posterior to the left lower lobe. No right pneumothorax is  seen. No pleural effusion is identified. There are degenerative  changes of the spine. No fracture or other osseous abnormality is  seen. No chest wall pathology is seen.     Abdomen and pelvis: The liver, spleen, pancreas, gallbladder, and  adrenal glands are normal. There are two cysts of the right kidney  measuring 2.2 and 2.0 cm in greatest dimension, respectively. No other  renal  abnormality is seen. The bladder is unremarkable. No enlarged  lymph node or other abnormal mass is demonstrated. No free fluid is  seen. No free intraperitoneal gas is identified. The gastrointestinal  tract is unremarkable. The appendix is not identified. There is no  additional evidence of appendicitis. There is calcification in the  vascular structures. There are degenerative changes in the spine. No  fracture or other osseous abnormality is seen. No abdominal or pelvic  wall pathology is demonstrated.       Impression    IMPRESSION: There is a small left pneumothorax. No definite rib  fracture is seen.     [Critical Result: Small left pneumothorax.]    Finding was identified on 11/16/2018 2:56 PM.     Dr. Nieto was contacted by me on 11/16/2018 3:01 PM and verbalized  understanding of the critical result.    TAMEKA BROOKS MD   Humerus XR,  G/E 2 views, left    Narrative    XR HUMERUS LT G/E 2 VW, XR FEMUR LT 2 VW 11/16/2018 2:52 PM    HISTORY: Fall. Pain.    COMPARISON: None.    TWO-VIEW LEFT HUMERUS: No fracture or dislocation.    TWO-VIEW LEFT FEMUR: No fracture or dislocation.    LIANNE AGUIRRE MD   XR Femur Left 2 Views    Narrative    XR HUMERUS LT G/E 2 VW, XR FEMUR LT 2 VW 11/16/2018 2:52 PM    HISTORY: Fall. Pain.    COMPARISON: None.    TWO-VIEW LEFT HUMERUS: No fracture or dislocation.    TWO-VIEW LEFT FEMUR: No fracture or dislocation.    LIANNE AGUIRRE MD   XR Chest 1 View    Narrative    XR CHEST 1 VW   11/16/2018 3:56 PM     HISTORY: L pneumothorax on CT ; please evaluate.;     COMPARISON: CT chest dated 11/16/2018      Impression    IMPRESSION: There is a small left apical pneumothorax. This is  unchanged in size when compared to the previous CT. Lungs are  otherwise clear.    EDUARDO ZARAGOZA MD   XR Chest 2 Views    Narrative    XR CHEST 2 VW 11/17/2018 6:55 AM    HISTORY: Pneumothorax.    COMPARISON: 11/16/2018    FINDINGS: Left apical pneumothorax is redemonstrated, not  significantly  changed from recent previous. Lungs are otherwise clear.      Impression    IMPRESSION: Left apical pneumothorax, not significantly changed.    ELAINE STEEL MD         Medications - No data to display    Assessments & Plan (with Medical Decision Making) records were reviewed.  She did not lose consciousness did not hit his head or denies any neck pain and therefore I did not think a CT scan of the head and neck were warranted.  Trauma evaluation was considered but patient did not meet true criteria.  Labs were obtained.  CT scan of the chest abdomen and pelvis was obtained due to the fall as well as an x-ray of the left arm and left leg.  White count was 10.6 hemoglobin 14.9 platelet count 169.1 neutrophils.  Base metabolic panel without abnormality.  Urine analysis with small blood.  CT scan of the chest abdomen pelvis revealed a small left pneumothorax without definitive rib fracture.  No acute abnormality was noted.  Chest x-ray was obtained to assess size this reveals a small left-sided apical pneumothorax.  I discussed case with Dr. Mccord who recommended either admission with observation or going home with observation.  Patient lives about half an hour away and due to his fall and age I felt it would be best to observe the patient.     I have reviewed the nursing notes.    I have reviewed the findings, diagnosis, plan and need for follow up with the patient.       New Prescriptions    No medications on file       Final diagnoses:   Pneumothorax, closed, traumatic, initial encounter   Fall, initial encounter   Arm contusion, left, initial encounter   Contusion of left lower extremity, initial encounter   Back contusion, left, initial encounter       11/16/2018   [facility] EMERGENCY DEPARTMENT     Fabián Nieto MD  11/19/18 1143

## 2020-12-13 ENCOUNTER — HEALTH MAINTENANCE LETTER (OUTPATIENT)
Age: 75
End: 2020-12-13

## 2021-01-25 ENCOUNTER — TELEPHONE (OUTPATIENT)
Dept: FAMILY MEDICINE | Facility: CLINIC | Age: 76
End: 2021-01-25

## 2021-01-25 NOTE — TELEPHONE ENCOUNTER
Topic: Procedural Question      The [facility] Dept says I may be able to schedule a covid vaccine shot thru my health care provider. Can I schedule the shot with [place]?

## 2021-01-25 NOTE — TELEPHONE ENCOUNTER
[employer] information sent to patient.    Margie BSN-RN  Triage Nurse  [facility]: [facility]

## 2021-01-28 ENCOUNTER — IMMUNIZATION (OUTPATIENT)
Dept: PEDIATRICS | Facility: CLINIC | Age: 76
End: 2021-01-28
Payer: COMMERCIAL

## 2021-01-28 PROCEDURE — 91300 PR COVID VAC PFIZER DIL RECON 30 MCG/0.3 ML IM: CPT

## 2021-01-28 PROCEDURE — 0001A PR COVID VAC PFIZER DIL RECON 30 MCG/0.3 ML IM: CPT

## 2021-02-18 ENCOUNTER — IMMUNIZATION (OUTPATIENT)
Dept: PEDIATRICS | Facility: CLINIC | Age: 76
End: 2021-02-18
Attending: INTERNAL MEDICINE
Payer: COMMERCIAL

## 2021-02-18 PROCEDURE — 0002A PR COVID VAC PFIZER DIL RECON 30 MCG/0.3 ML IM: CPT

## 2021-02-18 PROCEDURE — 91300 PR COVID VAC PFIZER DIL RECON 30 MCG/0.3 ML IM: CPT

## 2021-04-17 ENCOUNTER — HEALTH MAINTENANCE LETTER (OUTPATIENT)
Age: 76
End: 2021-04-17

## 2021-05-19 ASSESSMENT — ENCOUNTER SYMPTOMS
HEARTBURN: 0
DYSURIA: 0
PALPITATIONS: 0
DIZZINESS: 0
SORE THROAT: 0
ABDOMINAL PAIN: 0
EYE PAIN: 0
NERVOUS/ANXIOUS: 0
SHORTNESS OF BREATH: 0
COUGH: 0
CONSTIPATION: 0
FEVER: 0
HEMATURIA: 0
MYALGIAS: 0
DIARRHEA: 0
FREQUENCY: 1
CHILLS: 0
NAUSEA: 0
WEAKNESS: 0
PARESTHESIAS: 0
HEMATOCHEZIA: 0
ARTHRALGIAS: 0
JOINT SWELLING: 0
HEADACHES: 0

## 2021-05-19 ASSESSMENT — ACTIVITIES OF DAILY LIVING (ADL): CURRENT_FUNCTION: NO ASSISTANCE NEEDED

## 2021-05-24 ENCOUNTER — OFFICE VISIT (OUTPATIENT)
Dept: FAMILY MEDICINE | Facility: CLINIC | Age: 76
End: 2021-05-24
Payer: COMMERCIAL

## 2021-05-24 VITALS
WEIGHT: 191.8 LBS | BODY MASS INDEX: 25.98 KG/M2 | DIASTOLIC BLOOD PRESSURE: 64 MMHG | OXYGEN SATURATION: 95 % | TEMPERATURE: 96.1 F | HEIGHT: 72 IN | SYSTOLIC BLOOD PRESSURE: 130 MMHG | HEART RATE: 98 BPM | RESPIRATION RATE: 20 BRPM

## 2021-05-24 DIAGNOSIS — Z00.00 ENCOUNTER FOR ANNUAL WELLNESS EXAM IN MEDICARE PATIENT: Primary | ICD-10-CM

## 2021-05-24 DIAGNOSIS — Z12.5 SCREENING FOR PROSTATE CANCER: ICD-10-CM

## 2021-05-24 DIAGNOSIS — Z13.6 CARDIOVASCULAR SCREENING; LDL GOAL LESS THAN 160: ICD-10-CM

## 2021-05-24 LAB
CHOLEST SERPL-MCNC: 175 MG/DL
HDLC SERPL-MCNC: 48 MG/DL
LDLC SERPL CALC-MCNC: 96 MG/DL
NONHDLC SERPL-MCNC: 127 MG/DL
PSA SERPL-ACNC: 0.27 UG/L (ref 0–4)
TRIGL SERPL-MCNC: 153 MG/DL

## 2021-05-24 PROCEDURE — G0438 PPPS, INITIAL VISIT: HCPCS | Performed by: PHYSICIAN ASSISTANT

## 2021-05-24 PROCEDURE — G0103 PSA SCREENING: HCPCS | Performed by: PHYSICIAN ASSISTANT

## 2021-05-24 PROCEDURE — 36415 COLL VENOUS BLD VENIPUNCTURE: CPT | Performed by: PHYSICIAN ASSISTANT

## 2021-05-24 PROCEDURE — 80061 LIPID PANEL: CPT | Performed by: PHYSICIAN ASSISTANT

## 2021-05-24 ASSESSMENT — ENCOUNTER SYMPTOMS
MYALGIAS: 0
COUGH: 0
HEADACHES: 0
HEMATOCHEZIA: 0
ARTHRALGIAS: 0
CHILLS: 0
PARESTHESIAS: 0
CONSTIPATION: 0
DIZZINESS: 0
HEARTBURN: 0
SORE THROAT: 0
FREQUENCY: 1
WEAKNESS: 0
NERVOUS/ANXIOUS: 0
EYE PAIN: 0
DYSURIA: 0
DIARRHEA: 0
ABDOMINAL PAIN: 0
SHORTNESS OF BREATH: 0
PALPITATIONS: 0
HEMATURIA: 0
NAUSEA: 0
JOINT SWELLING: 0
FEVER: 0

## 2021-05-24 ASSESSMENT — MIFFLIN-ST. JEOR: SCORE: 1634.03

## 2021-05-24 ASSESSMENT — ACTIVITIES OF DAILY LIVING (ADL): CURRENT_FUNCTION: NO ASSISTANCE NEEDED

## 2021-05-24 NOTE — PROGRESS NOTES
"SUBJECTIVE:   Jaron Carrera is a 76 year old male who presents for Preventive Visit.      Patient has been advised of split billing requirements and indicates understanding: Yes   Are you in the first 12 months of your Medicare coverage?  No    Healthy Habits:     In general, how would you rate your overall health?  Good    Frequency of exercise:  4-5 days/week    Duration of exercise:  45-60 minutes    Do you usually eat at least 4 servings of fruit and vegetables a day, include whole grains    & fiber and avoid regularly eating high fat or \"junk\" foods?  Yes    Taking medications regularly:  Yes    Medication side effects:  None    Ability to successfully perform activities of daily living:  No assistance needed    Home Safety:  No safety concerns identified    Hearing Impairment:  No hearing concerns    In the past 6 months, have you been bothered by leaking of urine?  No    In general, how would you rate your overall mental or emotional health?  Excellent      PHQ-2 Total Score: 0    Additional concerns today:  No    Do you feel safe in your environment? Yes    Have you ever done Advance Care Planning? (For example, a Health Directive, POLST, or a discussion with a medical provider or your loved ones about your wishes): Yes, patient states has an Advance Care Planning document and will bring a copy to the clinic.    No hearing concerns     Fall risk  Fallen 2 or more times in the past year?: No  Any fall with injury in the past year?: No    Cognitive Screening   1) Repeat 3 items (Leader, Season, Table)    2) Clock draw: NORMAL  3) 3 item recall: Recalls 2 objects   Results: NORMAL clock, 1-2 items recalled: COGNITIVE IMPAIRMENT LESS LIKELY    Mini-CogTM Copyright WILIAN Long. Licensed by the author for use in [facility]; reprinted with permission (luciana@.[facility]). All rights reserved.      Do you have sleep apnea, excessive snoring or daytime drowsiness?: no    Reviewed and updated as needed this " visit by clinical staff  Tobacco  Allergies  Meds   Med Hx  Surg Hx  Fam Hx  Soc Hx        Reviewed and updated as needed this visit by Provider                Social History     Tobacco Use     Smoking status: Former Smoker     Packs/day: 1.00     Years: 2.00     Pack years: 2.00     Types: Cigarettes     Quit date: 1970     Years since quittin.8     Smokeless tobacco: Never Used   Substance Use Topics     Alcohol use: Yes     Alcohol/week: 1.7 standard drinks     Types: 2 Cans of beer per week     Comment: 1-2 beers weekly         Alcohol Use 2021   Prescreen: >3 drinks/day or >7 drinks/week? No   Prescreen: >3 drinks/day or >7 drinks/week? -               Current providers sharing in care for this patient include:   Patient Care Team:  Cirilo Spain PA-C as PCP - General (Physician Assistant)  Cirilo Spain PA-C as Assigned PCP  Ana Rosa Medel MD as Assigned Cancer Care Provider    The following health maintenance items are reviewed in Epic and correct as of today:  Health Maintenance Due   Topic Date Due     ANNUAL REVIEW OF HM ORDERS  Never done     ZOSTER IMMUNIZATION (2 of 3) 2012     FALL RISK ASSESSMENT  2019     DTAP/TDAP/TD IMMUNIZATION (2 - Td) 2020     Lab work is in process  Labs reviewed in EPIC  BP Readings from Last 3 Encounters:   21 130/64   19 132/72   19 120/73    Wt Readings from Last 3 Encounters:   21 87 kg (191 lb 12.8 oz)   19 86.2 kg (190 lb)   19 85.9 kg (189 lb 6.4 oz)                  Patient Active Problem List   Diagnosis     Vitamin D deficiency     Vitamin B12 deficiency     CARDIOVASCULAR SCREENING; LDL GOAL LESS THAN 160     Advanced directives, counseling/discussion     Pure hyperglyceridemia     Screening for prostate cancer     MGUS (monoclonal gammopathy of unknown significance)     Pneumothorax, closed, traumatic, initial encounter     Past Surgical History:   Procedure Laterality Date      COLONOSCOPY  3/21/2013    Procedure: COLONOSCOPY;  COLONOSCOPY SCREEN;  Surgeon: Walker Thompson MD;  Location: [facility] OR     HC ESOPHAGOSCOPY, DIAGNOSTIC       SURGICAL HISTORY OF -       Varicose Veins- Stripping      SURGICAL HISTORY OF -       Lt Knee Surgery torn MM        Social History     Tobacco Use     Smoking status: Former Smoker     Packs/day: 1.00     Years: 2.00     Pack years: 2.00     Types: Cigarettes     Quit date: 1970     Years since quittin.8     Smokeless tobacco: Never Used   Substance Use Topics     Alcohol use: Yes     Alcohol/week: 1.7 standard drinks     Types: 2 Cans of beer per week     Comment: 1-2 beers weekly     Family History   Problem Relation Age of Onset     Respiratory Father         COPD     Lipids Brother      Prostate Cancer Brother      Other Cancer Brother      Thyroid Disease Daughter      C.A.D. No family hx of      Cancer - colorectal No family hx of      Cerebrovascular Disease No family hx of      Diabetes No family hx of          Current Outpatient Medications   Medication Sig Dispense Refill     ASPIRIN 81 MG PO TABS 1 TABLET DAILY       Ferrous Sulfate (IRON SUPPLEMENT PO) Take 45 mg by mouth twice a week        VITAMIN B-12 PO Take one tablet every other day       VITAMIN D PO Take one tablet by mouth daily       Allergies   Allergen Reactions     Penicillins Hives     Childhood     Recent Labs   Lab Test 20  0906 19  0857 18  1212 18  1212 10/18/16  0805 10/18/16  0805 10/14/15  0839 14  0855 13  0833   LDL  --   --   --   --   --  110* 117 104 110   HDL  --   --   --   --   --  50 49 53 45   TRIG  --   --   --   --   --  211* 185* 136 196*   ALT 35  --   --  28  --   --   --  34  --    CR 1.12 1.06   < > 0.91   < >  --   --  1.10 1.01   GFRESTIMATED 64 69   < > 82   < >  --   --  66 73   GFRESTBLACK 74 80   < > >90   < >  --   --  80 89   POTASSIUM 4.3 4.3   < > 4.0   < >  --   --  4.0 4.4   TSH  --   --   " --  1.73  --   --   --   --  1.80    < > = values in this interval not displayed.                Review of Systems   Constitutional: Negative for chills and fever.   HENT: Negative for congestion, ear pain, hearing loss and sore throat.    Eyes: Negative for pain and visual disturbance.   Respiratory: Negative for cough and shortness of breath.    Cardiovascular: Negative for chest pain, palpitations and peripheral edema.   Gastrointestinal: Negative for abdominal pain, constipation, diarrhea, heartburn, hematochezia and nausea.   Genitourinary: Positive for frequency. Negative for discharge, dysuria, genital sores, hematuria, impotence and urgency.   Musculoskeletal: Negative for arthralgias, joint swelling and myalgias.   Skin: Negative for rash.   Neurological: Negative for dizziness, weakness, headaches and paresthesias.   Psychiatric/Behavioral: Negative for mood changes. The patient is not nervous/anxious.      Constitutional, HEENT, cardiovascular, pulmonary, GI, , musculoskeletal, neuro, skin, endocrine and psych systems are negative, except as otherwise noted.    OBJECTIVE:   /64   Pulse 98   Temp 96.1  F (35.6  C) (Tympanic)   Resp 20   Ht 1.822 m (5' 11.75\")   Wt 87 kg (191 lb 12.8 oz)   SpO2 95%   BMI 26.19 kg/m   Estimated body mass index is 26.19 kg/m  as calculated from the following:    Height as of this encounter: 1.822 m (5' 11.75\").    Weight as of this encounter: 87 kg (191 lb 12.8 oz).  Physical Exam  GENERAL: healthy, alert and no distress  EYES: Eyes grossly normal to inspection, PERRL and conjunctivae and sclerae normal  HENT: ear canals and TM's normal, nose and mouth without ulcers or lesions  NECK: no adenopathy, no asymmetry, masses, or scars and thyroid normal to palpation  RESP: lungs clear to auscultation - no rales, rhonchi or wheezes  CV: regular rate and rhythm, normal S1 S2, no S3 or S4, no murmur, click or rub, no peripheral edema and peripheral pulses " "strong  ABDOMEN: soft, nontender, no hepatosplenomegaly, no masses and bowel sounds normal  MS: no gross musculoskeletal defects noted, no edema  SKIN: no suspicious lesions or rashes  NEURO: Normal strength and tone, mentation intact and speech normal  PSYCH: mentation appears normal, affect normal/bright    Diagnostic Test Results:  Labs reviewed in Epic    ASSESSMENT / PLAN:       ICD-10-CM    1. Encounter for annual wellness exam in Medicare patient  Z00.00    2. CARDIOVASCULAR SCREENING; LDL GOAL LESS THAN 160  Z13.6 Lipid panel reflex to direct LDL Fasting   3. Screening for prostate cancer  Z12.5 PSA, screen     work on lifestyle modification    Patient has been advised of split billing requirements and indicates understanding: Yes  COUNSELING:  Reviewed preventive health counseling, as reflected in patient instructions       Regular exercise       Healthy diet/nutrition    Estimated body mass index is 26.19 kg/m  as calculated from the following:    Height as of this encounter: 1.822 m (5' 11.75\").    Weight as of this encounter: 87 kg (191 lb 12.8 oz).        He reports that he quit smoking about 50 years ago. His smoking use included cigarettes. He has a 2.00 pack-year smoking history. He has never used smokeless tobacco.      Appropriate preventive services were discussed with this patient, including applicable screening as appropriate for cardiovascular disease, diabetes, osteopenia/osteoporosis, and glaucoma.  As appropriate for age/gender, discussed screening for colorectal cancer, prostate cancer, breast cancer, and cervical cancer. Checklist reviewing preventive services available has been given to the patient.    Reviewed patients plan of care and provided an AVS. The Basic Care Plan (routine screening as documented in Health Maintenance) for Jaron meets the Care Plan requirement. This Care Plan has been established and reviewed with the Patient.    Counseling Resources:  ATP IV Guidelines  Pooled " Cohorts Equation Calculator  Breast Cancer Risk Calculator  Breast Cancer: Medication to Reduce Risk  FRAX Risk Assessment  ICSI Preventive Guidelines  Dietary Guidelines for Americans, 2010  USDA's MyPlate  ASA Prophylaxis  Lung CA Screening    BERE Lucio [facility] AKIL    Identified Health Risks:

## 2021-07-07 ENCOUNTER — MYC MEDICAL ADVICE (OUTPATIENT)
Dept: ONCOLOGY | Facility: CLINIC | Age: 76
End: 2021-07-07

## 2021-07-12 ENCOUNTER — LAB (OUTPATIENT)
Dept: LAB | Facility: CLINIC | Age: 76
End: 2021-07-12
Payer: COMMERCIAL

## 2021-07-12 DIAGNOSIS — D47.2 MGUS (MONOCLONAL GAMMOPATHY OF UNKNOWN SIGNIFICANCE): ICD-10-CM

## 2021-07-12 LAB
ALBUMIN SERPL-MCNC: 4 G/DL (ref 3.4–5)
ALP SERPL-CCNC: 93 U/L (ref 40–150)
ALT SERPL W P-5'-P-CCNC: 36 U/L (ref 0–70)
ANION GAP SERPL CALCULATED.3IONS-SCNC: 3 MMOL/L (ref 3–14)
AST SERPL W P-5'-P-CCNC: 26 U/L (ref 0–45)
BASOPHILS # BLD AUTO: 0 10E3/UL (ref 0–0.2)
BASOPHILS NFR BLD AUTO: 0 %
BILIRUB SERPL-MCNC: 1 MG/DL (ref 0.2–1.3)
BUN SERPL-MCNC: 18 MG/DL (ref 7–30)
CALCIUM SERPL-MCNC: 9.1 MG/DL (ref 8.5–10.1)
CHLORIDE BLD-SCNC: 107 MMOL/L (ref 94–109)
CO2 SERPL-SCNC: 28 MMOL/L (ref 20–32)
CREAT SERPL-MCNC: 1.17 MG/DL (ref 0.66–1.25)
EOSINOPHIL # BLD AUTO: 0.2 10E3/UL (ref 0–0.7)
EOSINOPHIL NFR BLD AUTO: 2 %
ERYTHROCYTE [DISTWIDTH] IN BLOOD BY AUTOMATED COUNT: 12.6 % (ref 10–15)
GFR SERPL CREATININE-BSD FRML MDRD: 60 ML/MIN/1.73M2
GLUCOSE BLD-MCNC: 93 MG/DL (ref 70–99)
HCT VFR BLD AUTO: 46.8 % (ref 40–53)
HGB BLD-MCNC: 15.9 G/DL (ref 13.3–17.7)
LYMPHOCYTES # BLD AUTO: 1.7 10E3/UL (ref 0.8–5.3)
LYMPHOCYTES NFR BLD AUTO: 24 %
MCH RBC QN AUTO: 32 PG (ref 26.5–33)
MCHC RBC AUTO-ENTMCNC: 34 G/DL (ref 31.5–36.5)
MCV RBC AUTO: 94 FL (ref 78–100)
MONOCYTES # BLD AUTO: 0.6 10E3/UL (ref 0–1.3)
MONOCYTES NFR BLD AUTO: 8 %
NEUTROPHILS # BLD AUTO: 4.5 10E3/UL (ref 1.6–8.3)
NEUTROPHILS NFR BLD AUTO: 65 %
PLATELET # BLD AUTO: 179 10E3/UL (ref 150–450)
POTASSIUM BLD-SCNC: 4.4 MMOL/L (ref 3.4–5.3)
PROT SERPL-MCNC: 7.9 G/DL (ref 6.8–8.8)
RBC # BLD AUTO: 4.97 10E6/UL (ref 4.4–5.9)
SODIUM SERPL-SCNC: 138 MMOL/L (ref 133–144)
WBC # BLD AUTO: 7 10E3/UL (ref 4–11)

## 2021-07-12 PROCEDURE — 83883 ASSAY NEPHELOMETRY NOT SPEC: CPT

## 2021-07-12 PROCEDURE — 85025 COMPLETE CBC W/AUTO DIFF WBC: CPT

## 2021-07-12 PROCEDURE — 81050 URINALYSIS VOLUME MEASURE: CPT | Performed by: PATHOLOGY

## 2021-07-12 PROCEDURE — 84166 PROTEIN E-PHORESIS/URINE/CSF: CPT | Performed by: PATHOLOGY

## 2021-07-12 PROCEDURE — 86335 IMMUNFIX E-PHORSIS/URINE/CSF: CPT

## 2021-07-12 PROCEDURE — 82784 ASSAY IGA/IGD/IGG/IGM EACH: CPT | Mod: 59

## 2021-07-12 PROCEDURE — 84165 PROTEIN E-PHORESIS SERUM: CPT | Mod: 59

## 2021-07-12 PROCEDURE — 84155 ASSAY OF PROTEIN SERUM: CPT | Mod: 59

## 2021-07-12 PROCEDURE — 36415 COLL VENOUS BLD VENIPUNCTURE: CPT

## 2021-07-12 PROCEDURE — 84165 PROTEIN E-PHORESIS SERUM: CPT

## 2021-07-12 PROCEDURE — 80053 COMPREHEN METABOLIC PANEL: CPT

## 2021-07-12 PROCEDURE — 86334 IMMUNOFIX E-PHORESIS SERUM: CPT

## 2021-07-12 PROCEDURE — 82784 ASSAY IGA/IGD/IGG/IGM EACH: CPT

## 2021-07-13 LAB
ALBUMIN MFR UR ELPH: 11.8 %
ALPHA1 GLOB MFR UR ELPH: 16.3 %
ALPHA2 GLOB MFR UR ELPH: 10.5 %
B-GLOBULIN MFR UR ELPH: 14.9 %
GAMMA GLOB MFR UR ELPH: 46.5 %
IGA SERPL-MCNC: 107 MG/DL (ref 84–499)
IGG SERPL-MCNC: 1599 MG/DL (ref 610–1616)
IGM SERPL-MCNC: 64 MG/DL (ref 35–242)
KAPPA LC FREE SER-MCNC: 8.24 MG/DL (ref 0.33–1.94)
KAPPA LC FREE/LAMBDA FREE SER NEPH: 8.77 {RATIO} (ref 0.26–1.65)
LAMBDA LC FREE SERPL-MCNC: 0.94 MG/DL (ref 0.57–2.63)
M PROTEIN MFR UR ELPH: 24.8 %
PROT PATTERN UR ELPH-IMP: ABNORMAL
TOTAL PROTEIN SERUM FOR ELP: 7.7 G/DL (ref 6.8–8.8)

## 2021-07-14 ENCOUNTER — VIRTUAL VISIT (OUTPATIENT)
Dept: ONCOLOGY | Facility: CLINIC | Age: 76
End: 2021-07-14
Attending: INTERNAL MEDICINE
Payer: COMMERCIAL

## 2021-07-14 DIAGNOSIS — D47.2 MGUS (MONOCLONAL GAMMOPATHY OF UNKNOWN SIGNIFICANCE): Primary | ICD-10-CM

## 2021-07-14 LAB
IGA SERPL-MCNC: 107 MG/DL (ref 84–499)
IGG SERPL-MCNC: 1599 MG/DL (ref 610–1616)
IGM SERPL-MCNC: 64 MG/DL (ref 35–242)
PROT ELPH PNL UR ELPH: NORMAL
PROT PATTERN SERPL IFE-IMP: NORMAL

## 2021-07-14 PROCEDURE — 99213 OFFICE O/P EST LOW 20 MIN: CPT | Mod: 95 | Performed by: INTERNAL MEDICINE

## 2021-07-14 NOTE — LETTER
7/14/2021         RE: Jaron Carrera  [address] [place] [address]        Dear Colleague,    Thank you for referring your patient, Jaron Carrera, to the [facility]. Please see a copy of my visit note below.      FOLLOW-UP VISIT NOTE    PATIENT NAME: Jaron Carrera MRN # [id number]  DATE OF VISIT: Jul 14, 2021 YOB: 1945    REFERRING PROVIDER: Cirilo Spain PA-C  [id number] [place] [address]    Reason for follow up   Monoclonal gammopathy    HISTORY:  He was being followed by Dr. Leary but now he has transferred his care to me.  I have reviewed his previous records and have copied and updated from prior notes.    76 year old  male with no significant past medical history who in April 2018 presented to primary care provider with complaints of fatigue. He was noted to have mild anemia was further workup was ordered including protein electrophoresis which revealed monoclonal paraprotein along with elevated ferritin and CRP. Subsequently serum immunofixation and urine immunofixation were requested which revealed 1.3 g/ dl IgG kappa paraprotein with IgG 1880 mg/dl.    05/21/18 negative for evidence of end organ damage( CRAB criteria)  bone marrow biopsy showing 2-3% plasma cells. 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    SUBJECTIVE     This is a video visit.  He feels good and currently does not offer any complaints.  He specifically denies any pain or B symptoms or infections or shortness of breath or new swellings or lack of energy.     ROS:  Rest of the comprehensive review of the system was essentially unremarkable.    I reviewed other hx in Epic as below      PAST MEDICAL HISTORY     Past Medical History:   Diagnosis Date     Vitamin B12 deficiency      Vitamin D deficiencies          CURRENT OUTPATIENT MEDICATIONS     Current  Outpatient Medications   Medication Sig Dispense Refill     ASPIRIN 81 MG PO TABS 1 TABLET DAILY       Ferrous Sulfate (IRON SUPPLEMENT PO) Take 45 mg by mouth twice a week        VITAMIN B-12 PO Take one tablet every other day       VITAMIN D PO Take one tablet by mouth daily       FAMILY HX:  Family History   Problem Relation Age of Onset     Respiratory Father         COPD     Lipids Brother      Prostate Cancer Brother      Other Cancer Brother      Thyroid Disease Daughter      C.A.D. No family hx of      Cancer - colorectal No family hx of      Cerebrovascular Disease No family hx of      Diabetes No family hx of      1 brother had multiple myeloma- he had exposure to agent orange.  Another brother also has MGUS and he also is on observation.  3 daughters are healthy       ALLERGIES     Allergies   Allergen Reactions     Pcn [Penicillins] Hives     Childhood     SOCIAL HISTORY:  Social History     Socioeconomic History     Marital status:      Spouse name: Alyssa     Number of children: 3     Years of education: Not on file     Highest education level: Not on file   Occupational History     Occupation: certified      Employer: RETIRED   Tobacco Use     Smoking status: Former Smoker     Packs/day: 1.00     Years: 2.00     Pack years: 2.00     Types: Cigarettes     Quit date: 1970     Years since quittin.0     Smokeless tobacco: Never Used   Substance and Sexual Activity     Alcohol use: Yes     Alcohol/week: 1.7 standard drinks     Types: 2 Cans of beer per week     Comment: 1-2 beers weekly     Drug use: No     Sexual activity: Yes     Partners: Female     Birth control/protection: None   Other Topics Concern     Parent/sibling w/ CABG, MI or angioplasty before 65F 55M? No   Social History Narrative     Not on file     Social Determinants of Health     Financial Resource Strain:      Difficulty of Paying Living Expenses:    Food Insecurity:      Worried About Running Out of Food in  the Last Year:      Ran Out of Food in the Last Year:    Transportation Needs:      Lack of Transportation (Medical):      Lack of Transportation (Non-Medical):    Physical Activity:      Days of Exercise per Week:      Minutes of Exercise per Session:    Stress:      Feeling of Stress :    Social Connections:      Frequency of Communication with Friends and Family:      Frequency of Social Gatherings with Friends and Family:      Attends [employer] Services:      Active Member of Clubs or Organizations:      Attends Club or Organization Meetings:      Marital Status:    Intimate Partner Violence:      Fear of Current or Ex-Partner:      Emotionally Abused:      Physically Abused:      Sexually Abused:    No smoking. Drinks etoh occasionally.      PHYSICAL EXAM     There were no vitals taken for this visit.  Wt Readings from Last 4 Encounters:   05/24/21 87 kg (191 lb 12.8 oz)   06/14/19 86.2 kg (190 lb)   05/30/19 85.9 kg (189 lb 6.4 oz)   04/08/19 86 kg (189 lb 9 oz)         Constitutional.  Looks well and in no apparent distress.   Eyes.  Without eye redness or apparent jaundice.   Respiratory.  Non labored breathing. Speaking in full sentences.    Skin.  No concerning skin rashes on the skin visualized.   Neurological.  Is alert and oriented.  Psychiatric.  Mood and affect seem appropriate.      The rest of a comprehensive physical examination is deferred due to Public Health Emergency video visit restrictions.       LABORATORY AND IMAGING STUDIES     Reviewed   7/12/2021  CBC- unremarkable  CMP- unremarkable  IgG 1599, IgA 107, IgM 64  FLC---K- 8.24, L-0.94, K/L 8.77  SPEP pending  24 UPEP- M Pedro 24.8- it was 14.5 in July 2020 but 28.4 in Dec 2018       ASSESSMENT AND PLAN     76 year old  male with workup of fatigue and mild anemia of inflammation in April 2018  was noted to have a monoclonal IgG Kappa paraprotein.     IgG kappa MGUS.  No evidence of myeloma.  He is on observation.  Overall he is doing well  and his labs seem stable.  Repeat SPEP is pending but serum IgG levels and free light chains and total protein level are is stable so I think that the M spike would not be significantly different but we will have to follow that.      24-hour urine protein electrophoresis showed M spike of 24.8 but it was 28.4 in December 2018 although it was 14.5 last year.  I believe this is a routine fluctuations which we are picking up on his urine test.    At this time I recommend continued serial monitoring and repeating labs including 24-hour urine protein electrophoresis/KATARZYNA in 1 year assuming that he remains well and asymptomatic.    If he has any questions or concerns in the interim, then he will let me know.      I will see him back in 1 year with repeat labs/urine test prior.      I answered all of his questions to his satisfaction.  He is agreeable and comfortable with the plan.      Ana Rosa Medel MD        Video start time.9:48 AM  Video stop time. 9:57 AM    Rose is a 76 year old who is being evaluated via a billable video visit.      How would you like to obtain your AVS? MyChart  If the video visit is dropped, the invitation should be resent by: Text to cell phone: [phone]  Will anyone else be joining your video visit? Yes: Spouse may sit in. How would they like to receive their invitation? Other e-mail: NA            Again, thank you for allowing me to participate in the care of your patient.        Sincerely,        Ana Rosa Medel MD

## 2021-07-14 NOTE — PROGRESS NOTES
FOLLOW-UP VISIT NOTE    PATIENT NAME: Jaron Carrera MRN # [id number]  DATE OF VISIT: Jul 14, 2021 YOB: 1945    REFERRING PROVIDER: Cirilo Spain PA-C  [address] [place] [address]    Reason for follow up   Monoclonal gammopathy    HISTORY:  He was being followed by Dr. Leary but now he has transferred his care to me.  I have reviewed his previous records and have copied and updated from prior notes.    76 year old  male with no significant past medical history who in April 2018 presented to primary care provider with complaints of fatigue. He was noted to have mild anemia was further workup was ordered including protein electrophoresis which revealed monoclonal paraprotein along with elevated ferritin and CRP. Subsequently serum immunofixation and urine immunofixation were requested which revealed 1.3 g/ dl IgG kappa paraprotein with IgG 1880 mg/dl.    05/21/18 negative for evidence of end organ damage( CRAB criteria)  bone marrow biopsy showing 2-3% plasma cells. 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    SUBJECTIVE     This is a video visit.  He feels good and currently does not offer any complaints.  He specifically denies any pain or B symptoms or infections or shortness of breath or new swellings or lack of energy.     ROS:  Rest of the comprehensive review of the system was essentially unremarkable.    I reviewed other hx in Epic as below      PAST MEDICAL HISTORY     Past Medical History:   Diagnosis Date     Vitamin B12 deficiency      Vitamin D deficiencies          CURRENT OUTPATIENT MEDICATIONS     Current Outpatient Medications   Medication Sig Dispense Refill     ASPIRIN 81 MG PO TABS 1 TABLET DAILY       Ferrous Sulfate (IRON SUPPLEMENT PO) Take 45 mg by mouth twice a week        VITAMIN B-12 PO Take one tablet every other day       VITAMIN D PO Take one tablet by mouth  daily       FAMILY HX:  Family History   Problem Relation Age of Onset     Respiratory Father         COPD     Lipids Brother      Prostate Cancer Brother      Other Cancer Brother      Thyroid Disease Daughter      C.A.D. No family hx of      Cancer - colorectal No family hx of      Cerebrovascular Disease No family hx of      Diabetes No family hx of      1 brother had multiple myeloma- he had exposure to agent orange.  Another brother also has MGUS and he also is on observation.  3 daughters are healthy       ALLERGIES     Allergies   Allergen Reactions     Pcn [Penicillins] Hives     Childhood     SOCIAL HISTORY:  Social History     Socioeconomic History     Marital status:      Spouse name: Alyssa     Number of children: 3     Years of education: Not on file     Highest education level: Not on file   Occupational History     Occupation: certified      Employer: RETIRED   Tobacco Use     Smoking status: Former Smoker     Packs/day: 1.00     Years: 2.00     Pack years: 2.00     Types: Cigarettes     Quit date: 1970     Years since quittin.0     Smokeless tobacco: Never Used   Substance and Sexual Activity     Alcohol use: Yes     Alcohol/week: 1.7 standard drinks     Types: 2 Cans of beer per week     Comment: 1-2 beers weekly     Drug use: No     Sexual activity: Yes     Partners: Female     Birth control/protection: None   Other Topics Concern     Parent/sibling w/ CABG, MI or angioplasty before 65F 55M? No   Social History Narrative     Not on file     Social Determinants of Health     Financial Resource Strain:      Difficulty of Paying Living Expenses:    Food Insecurity:      Worried About Running Out of Food in the Last Year:      Ran Out of Food in the Last Year:    Transportation Needs:      Lack of Transportation (Medical):      Lack of Transportation (Non-Medical):    Physical Activity:      Days of Exercise per Week:      Minutes of Exercise per Session:    Stress:       Feeling of Stress :    Social Connections:      Frequency of Communication with Friends and Family:      Frequency of Social Gatherings with Friends and Family:      Attends Muslim Services:      Active Member of Clubs or Organizations:      Attends Club or Organization Meetings:      Marital Status:    Intimate Partner Violence:      Fear of Current or Ex-Partner:      Emotionally Abused:      Physically Abused:      Sexually Abused:    No smoking. Drinks etoh occasionally.      PHYSICAL EXAM     There were no vitals taken for this visit.  Wt Readings from Last 4 Encounters:   05/24/21 87 kg (191 lb 12.8 oz)   06/14/19 86.2 kg (190 lb)   05/30/19 85.9 kg (189 lb 6.4 oz)   04/08/19 86 kg (189 lb 9 oz)         Constitutional.  Looks well and in no apparent distress.   Eyes.  Without eye redness or apparent jaundice.   Respiratory.  Non labored breathing. Speaking in full sentences.    Skin.  No concerning skin rashes on the skin visualized.   Neurological.  Is alert and oriented.  Psychiatric.  Mood and affect seem appropriate.      The rest of a comprehensive physical examination is deferred due to Public Health Emergency video visit restrictions.       LABORATORY AND IMAGING STUDIES     Reviewed   7/12/2021  CBC- unremarkable  CMP- unremarkable  IgG 1599, IgA 107, IgM 64  FLC---K- 8.24, L-0.94, K/L 8.77  SPEP pending  24 UPEP- M Pedro 24.8- it was 14.5 in July 2020 but 28.4 in Dec 2018       ASSESSMENT AND PLAN     76 year old  male with workup of fatigue and mild anemia of inflammation in April 2018  was noted to have a monoclonal IgG Kappa paraprotein.     IgG kappa MGUS.  No evidence of myeloma.  He is on observation.  Overall he is doing well and his labs seem stable.  Repeat SPEP is pending but serum IgG levels and free light chains and total protein level are is stable so I think that the M spike would not be significantly different but we will have to follow that.      24-hour urine protein  electrophoresis showed M spike of 24.8 but it was 28.4 in December 2018 although it was 14.5 last year.  I believe this is a routine fluctuations which we are picking up on his urine test.    At this time I recommend continued serial monitoring and repeating labs including 24-hour urine protein electrophoresis/KATARZYNA in 1 year assuming that he remains well and asymptomatic.    If he has any questions or concerns in the interim, then he will let me know.      I will see him back in 1 year with repeat labs/urine test prior.      I answered all of his questions to his satisfaction.  He is agreeable and comfortable with the plan.      Ana Rosa Medel MD        Video start time.9:48 AM  Video stop time. 9:57 AM

## 2021-07-14 NOTE — PROGRESS NOTES
Rose is a 76 year old who is being evaluated via a billable video visit.      How would you like to obtain your AVS? MyChart  If the video visit is dropped, the invitation should be resent by: Text to cell phone: [phone]  Will anyone else be joining your video visit? Yes: Spouse may sit in. How would they like to receive their invitation? Other e-mail: NA

## 2021-07-15 LAB
ALBUMIN SERPL ELPH-MCNC: 4.4 G/DL
ALPHA1 GLOB SERPL ELPH-MCNC: 0.2 G/DL
ALPHA2 GLOB SERPL ELPH-MCNC: 1 G/DL
B-GLOBULIN SERPL ELPH-MCNC: 0.6 G/DL
GAMMA GLOB SERPL ELPH-MCNC: 1.5 G/DL
M PROTEIN SERPL ELPH-MCNC: 1.2 G/DL
PROT PATTERN SERPL ELPH-IMP: NORMAL

## 2021-09-26 ENCOUNTER — HEALTH MAINTENANCE LETTER (OUTPATIENT)
Age: 76
End: 2021-09-26

## 2022-07-03 ENCOUNTER — HEALTH MAINTENANCE LETTER (OUTPATIENT)
Age: 77
End: 2022-07-03

## 2022-07-08 ENCOUNTER — LAB (OUTPATIENT)
Dept: LAB | Facility: CLINIC | Age: 77
End: 2022-07-08
Payer: COMMERCIAL

## 2022-07-08 DIAGNOSIS — D47.2 MGUS (MONOCLONAL GAMMOPATHY OF UNKNOWN SIGNIFICANCE): ICD-10-CM

## 2022-07-08 LAB
ALBUMIN SERPL-MCNC: 4.1 G/DL (ref 3.4–5)
ALP SERPL-CCNC: 80 U/L (ref 40–150)
ALT SERPL W P-5'-P-CCNC: 34 U/L (ref 0–70)
ANION GAP SERPL CALCULATED.3IONS-SCNC: 3 MMOL/L (ref 3–14)
AST SERPL W P-5'-P-CCNC: 22 U/L (ref 0–45)
BASOPHILS # BLD AUTO: 0 10E3/UL (ref 0–0.2)
BASOPHILS NFR BLD AUTO: 1 %
BILIRUB SERPL-MCNC: 0.7 MG/DL (ref 0.2–1.3)
BUN SERPL-MCNC: 21 MG/DL (ref 7–30)
CALCIUM SERPL-MCNC: 9.2 MG/DL (ref 8.5–10.1)
CHLORIDE BLD-SCNC: 108 MMOL/L (ref 94–109)
CO2 SERPL-SCNC: 28 MMOL/L (ref 20–32)
CREAT SERPL-MCNC: 1.16 MG/DL (ref 0.66–1.25)
EOSINOPHIL # BLD AUTO: 0.2 10E3/UL (ref 0–0.7)
EOSINOPHIL NFR BLD AUTO: 3 %
ERYTHROCYTE [DISTWIDTH] IN BLOOD BY AUTOMATED COUNT: 12.4 % (ref 10–15)
GFR SERPL CREATININE-BSD FRML MDRD: 65 ML/MIN/1.73M2
GLUCOSE BLD-MCNC: 88 MG/DL (ref 70–99)
HCT VFR BLD AUTO: 45 % (ref 40–53)
HGB BLD-MCNC: 15.6 G/DL (ref 13.3–17.7)
LYMPHOCYTES # BLD AUTO: 1.7 10E3/UL (ref 0.8–5.3)
LYMPHOCYTES NFR BLD AUTO: 26 %
MCH RBC QN AUTO: 32.8 PG (ref 26.5–33)
MCHC RBC AUTO-ENTMCNC: 34.7 G/DL (ref 31.5–36.5)
MCV RBC AUTO: 95 FL (ref 78–100)
MONOCYTES # BLD AUTO: 0.6 10E3/UL (ref 0–1.3)
MONOCYTES NFR BLD AUTO: 9 %
NEUTROPHILS # BLD AUTO: 4 10E3/UL (ref 1.6–8.3)
NEUTROPHILS NFR BLD AUTO: 62 %
PLATELET # BLD AUTO: 181 10E3/UL (ref 150–450)
POTASSIUM BLD-SCNC: 4.4 MMOL/L (ref 3.4–5.3)
PROT SERPL-MCNC: 7.7 G/DL (ref 6.8–8.8)
RBC # BLD AUTO: 4.75 10E6/UL (ref 4.4–5.9)
SODIUM SERPL-SCNC: 139 MMOL/L (ref 133–144)
TOTAL PROTEIN SERUM FOR ELP: 7.5 G/DL (ref 6.4–8.3)
WBC # BLD AUTO: 6.5 10E3/UL (ref 4–11)

## 2022-07-08 PROCEDURE — 83521 IG LIGHT CHAINS FREE EACH: CPT

## 2022-07-08 PROCEDURE — 84155 ASSAY OF PROTEIN SERUM: CPT | Mod: 59

## 2022-07-08 PROCEDURE — 80053 COMPREHEN METABOLIC PANEL: CPT

## 2022-07-08 PROCEDURE — 82784 ASSAY IGA/IGD/IGG/IGM EACH: CPT

## 2022-07-08 PROCEDURE — 84165 PROTEIN E-PHORESIS SERUM: CPT | Performed by: PATHOLOGY

## 2022-07-08 PROCEDURE — 36415 COLL VENOUS BLD VENIPUNCTURE: CPT

## 2022-07-08 PROCEDURE — 85025 COMPLETE CBC W/AUTO DIFF WBC: CPT

## 2022-07-08 PROCEDURE — 86334 IMMUNOFIX E-PHORESIS SERUM: CPT

## 2022-07-11 LAB
ALBUMIN SERPL ELPH-MCNC: 4.5 G/DL (ref 3.7–5.1)
ALPHA1 GLOB SERPL ELPH-MCNC: 0.2 G/DL (ref 0.2–0.4)
ALPHA2 GLOB SERPL ELPH-MCNC: 0.8 G/DL (ref 0.5–0.9)
B-GLOBULIN SERPL ELPH-MCNC: 0.6 G/DL (ref 0.6–1)
GAMMA GLOB SERPL ELPH-MCNC: 1.4 G/DL (ref 0.7–1.6)
IGA SERPL-MCNC: 87 MG/DL (ref 84–499)
IGG SERPL-MCNC: 1527 MG/DL (ref 610–1616)
IGM SERPL-MCNC: 48 MG/DL (ref 35–242)
KAPPA LC FREE SER-MCNC: 8.46 MG/DL (ref 0.33–1.94)
KAPPA LC FREE/LAMBDA FREE SER NEPH: 13.22 {RATIO} (ref 0.26–1.65)
LAMBDA LC FREE SERPL-MCNC: 0.64 MG/DL (ref 0.57–2.63)
M PROTEIN SERPL ELPH-MCNC: 0.9 G/DL
PROT PATTERN SERPL ELPH-IMP: ABNORMAL
PROT PATTERN SERPL IFE-IMP: NORMAL

## 2022-07-11 PROCEDURE — 86335 IMMUNFIX E-PHORSIS/URINE/CSF: CPT | Performed by: PATHOLOGY

## 2022-07-11 PROCEDURE — 81050 URINALYSIS VOLUME MEASURE: CPT

## 2022-07-11 PROCEDURE — 84166 PROTEIN E-PHORESIS/URINE/CSF: CPT

## 2022-07-12 LAB
ALBUMIN MFR UR ELPH: 18 %
ALPHA1 GLOB MFR UR ELPH: 4.6 %
ALPHA2 GLOB MFR UR ELPH: 5.9 %
B-GLOBULIN MFR UR ELPH: 18.8 %
GAMMA GLOB MFR UR ELPH: 52.7 %
M PROTEIN MFR UR ELPH: 32.5 %
PROT ELPH PNL UR ELPH: NORMAL
PROT PATTERN UR ELPH-IMP: ABNORMAL

## 2022-08-30 ENCOUNTER — TELEPHONE (OUTPATIENT)
Dept: ONCOLOGY | Facility: CLINIC | Age: 77
End: 2022-08-30

## 2022-08-30 ENCOUNTER — VIRTUAL VISIT (OUTPATIENT)
Dept: ONCOLOGY | Facility: CLINIC | Age: 77
End: 2022-08-30
Payer: COMMERCIAL

## 2022-08-30 DIAGNOSIS — D47.2 MGUS (MONOCLONAL GAMMOPATHY OF UNKNOWN SIGNIFICANCE): Primary | ICD-10-CM

## 2022-08-30 PROCEDURE — 99214 OFFICE O/P EST MOD 30 MIN: CPT | Mod: 95 | Performed by: INTERNAL MEDICINE

## 2022-08-30 NOTE — TELEPHONE ENCOUNTER
Attempted to reach pt to schedule 1 yr virtual follow up appt and lab appt prior with Dr. Jenkins [facility]. Pt was not available at the time of call and lm w/wife. Scheduling number provided and pt will call back to schedule appts.  Pamela Ramires, Virtual Visit Facilitator

## 2022-08-30 NOTE — NURSING NOTE
Patient verified medications and allergies are correct via eCheck-in. Patient confirms no changes at this time and/or since last reviewed by clinic staff.    Pamela Ramires, Virtual Facilitator

## 2022-08-30 NOTE — LETTER
8/30/2022         RE: Jaron Carrera  [address] [place] [address]        Dear Colleague,    Thank you for referring your patient, Jaron Carrera, to the [facility]. Please see a copy of my visit note below.    Rose is a 77 year old who is being evaluated via a billable video visit.      Patient stated he is in the state of [place] for the visit today.    How would you like to obtain your AVS? MyChart  If the video visit is dropped, the invitation should be resent by: Text to cell phone: [phone]  Will anyone else be joining your video visit? No      Video-Visit Details    Video Start Time: 7:25 AM    Type of service:  Video Visit    Video End Time:7:30 AM    Originating Location (pt. Location): Home    Distant Location (provider location):  [facility]     Platform used for Video Visit: Jose Ramires, Virtual Visit Facilitator        FOLLOW-UP VISIT NOTE    PATIENT NAME: Jaron Carrera MRN # [id number]  DATE OF VISIT: Aug 30, 2022 YOB: 1945    REFERRING PROVIDER: Cirilo Spain PA-C  [address] [place] [address]    Reason for follow up   Monoclonal gammopathy    HISTORY:  He was being followed by Dr. Leary but now he has transferred his care to me.  I have reviewed his previous records and have copied and updated from prior notes.    77 year old  male with no significant past medical history who in April 2018 presented to primary care provider with complaints of fatigue. He was noted to have mild anemia was further workup was ordered including protein electrophoresis which revealed monoclonal paraprotein along with elevated ferritin and CRP. Subsequently serum immunofixation and urine immunofixation were requested which revealed 1.3 g/ dl IgG kappa paraprotein with IgG 1880 mg/dl.    05/21/18 negative for evidence of end organ damage( CRAB criteria)  bone marrow biopsy showing 2-3% plasma  cells. 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    SUBJECTIVE     This is a video visit.  He is doing well.  He denies any new pain or new swellings or night sweats or weight loss.  Denies lack of energy.  No neuropathy.  No dyspnea.      ROS:  Rest of the comprehensive review of the system was essentially unremarkable.    I reviewed other hx in Epic as below      PAST MEDICAL HISTORY     Past Medical History:   Diagnosis Date     Vitamin B12 deficiency      Vitamin D deficiencies          CURRENT OUTPATIENT MEDICATIONS     Current Outpatient Medications   Medication Sig Dispense Refill     ASPIRIN 81 MG PO TABS 1 TABLET DAILY       Ferrous Sulfate (IRON SUPPLEMENT PO) Take 45 mg by mouth twice a week        VITAMIN B-12 PO Take one tablet every other day       VITAMIN D PO Take one tablet by mouth daily       FAMILY HX:  Family History   Problem Relation Age of Onset     Respiratory Father         COPD     Lipids Brother      Prostate Cancer Brother      Other Cancer Brother      Thyroid Disease Daughter      C.A.D. No family hx of      Cancer - colorectal No family hx of      Cerebrovascular Disease No family hx of      Diabetes No family hx of      1 brother had multiple myeloma- he had exposure to agent orange.  Another brother also has MGUS and he also is on observation.  3 daughters are healthy       ALLERGIES     Allergies   Allergen Reactions     Pcn [Penicillins] Hives     Childhood     SOCIAL HISTORY:  Social History     Socioeconomic History     Marital status:      Spouse name: Alyssa     Number of children: 3     Years of education: Not on file     Highest education level: Not on file   Occupational History     Occupation: certified      Employer: RETIRED   Tobacco Use     Smoking status: Former Smoker     Packs/day: 1.00     Years: 2.00     Pack years: 2.00     Types: Cigarettes     Quit  date: 1970     Years since quittin.1     Smokeless tobacco: Never Used   Substance and Sexual Activity     Alcohol use: Yes     Alcohol/week: 1.7 standard drinks     Types: 2 Cans of beer per week     Comment: 1-2 beers weekly     Drug use: No     Sexual activity: Yes     Partners: Female     Birth control/protection: None   Other Topics Concern     Parent/sibling w/ CABG, MI or angioplasty before 65F 55M? No   Social History Narrative     Not on file     Social Determinants of Health     Financial Resource Strain: Not on file   Food Insecurity: Not on file   Transportation Needs: Not on file   Physical Activity: Not on file   Stress: Not on file   Social Connections: Not on file   Intimate Partner Violence: Not on file   Housing Stability: Not on file   No smoking. Drinks etoh occasionally.      PHYSICAL EXAM     There were no vitals taken for this visit.  Wt Readings from Last 4 Encounters:   21 87 kg (191 lb 12.8 oz)   19 86.2 kg (190 lb)   19 85.9 kg (189 lb 6.4 oz)   19 86 kg (189 lb 9 oz)           Constitutional.  Does not seem to be in any acute distress.  Eyes.  No redness or discharge noted.  Respiratory.  Speaking in full sentences.  Breathing seems comfortable without any accessory use of muscles.    Skin.  Visualized his skin does not show any obvious rashes.  Musculoskeletal.  Range of motion for visualized areas is intact.  Neurological.  Alert and oriented x3.  Psychiatric.  Mood, mentation and affect are normal.  Decision making capacity is intact.      The rest of a comprehensive physical examination is deferred due to Public Health Emergency video visit restrictions.       LABORATORY AND IMAGING STUDIES     Reviewed   2022    CBC unremarkable.  CMP unremarkable.  SPEP showed M spike 0.9.  KATARZYNA shows monoclonal IgG kappa.  Urine M spike 32.4.  KATARZYNA shows monoclonal free kappa light chain.  Serum IgG 1527.  Kappa 8.46.  Lambda 0.64.  Ratio 13.2.       ASSESSMENT  AND PLAN     77 year old  male with workup of fatigue and mild anemia of inflammation in April 2018  was noted to have a monoclonal IgG Kappa paraprotein.     IgG kappa MGUS.  No evidence of myeloma.  He is on observation.   He feels well.  Overall labs are stable.  Serum IgG is stable.  Serum M spike is a stable.  Serum free kappa light chains are also stable.  There is slight increase in urine M spike of 32.4.  It has been 28 in the past as well.     As overall labs are stable and he is doing well.  We will continue to monitor labs once a year.    Return to clinic in 1 year.  I answered all of his questions to his satisfaction.  He is agreeable and comfortable with the plan.      Ana Rosa Medel MD             Again, thank you for allowing me to participate in the care of your patient.        Sincerely,        Ana Rosa Medel MD

## 2022-08-30 NOTE — PROGRESS NOTES
FOLLOW-UP VISIT NOTE    PATIENT NAME: Jaron Carrera MRN # [id number]  DATE OF VISIT: Aug 30, 2022 YOB: 1945    REFERRING PROVIDER: Criilo Spain PA-C  [id number] [place] [address]    Reason for follow up   Monoclonal gammopathy    HISTORY:  He was being followed by Dr. Leary but now he has transferred his care to me.  I have reviewed his previous records and have copied and updated from prior notes.    77 year old  male with no significant past medical history who in April 2018 presented to primary care provider with complaints of fatigue. He was noted to have mild anemia was further workup was ordered including protein electrophoresis which revealed monoclonal paraprotein along with elevated ferritin and CRP. Subsequently serum immunofixation and urine immunofixation were requested which revealed 1.3 g/ dl IgG kappa paraprotein with IgG 1880 mg/dl.    05/21/18 negative for evidence of end organ damage( CRAB criteria)  bone marrow biopsy showing 2-3% plasma cells. 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    SUBJECTIVE     This is a video visit.  He is doing well.  He denies any new pain or new swellings or night sweats or weight loss.  Denies lack of energy.  No neuropathy.  No dyspnea.      ROS:  Rest of the comprehensive review of the system was essentially unremarkable.    I reviewed other hx in Epic as below      PAST MEDICAL HISTORY     Past Medical History:   Diagnosis Date     Vitamin B12 deficiency      Vitamin D deficiencies          CURRENT OUTPATIENT MEDICATIONS     Current Outpatient Medications   Medication Sig Dispense Refill     ASPIRIN 81 MG PO TABS 1 TABLET DAILY       Ferrous Sulfate (IRON SUPPLEMENT PO) Take 45 mg by mouth twice a week        VITAMIN B-12 PO Take one tablet every other day       VITAMIN D PO Take one tablet by mouth daily       FAMILY HX:  Family  History   Problem Relation Age of Onset     Respiratory Father         COPD     Lipids Brother      Prostate Cancer Brother      Other Cancer Brother      Thyroid Disease Daughter      C.A.D. No family hx of      Cancer - colorectal No family hx of      Cerebrovascular Disease No family hx of      Diabetes No family hx of      1 brother had multiple myeloma- he had exposure to agent orange.  Another brother also has MGUS and he also is on observation.  3 daughters are healthy       ALLERGIES     Allergies   Allergen Reactions     Pcn [Penicillins] Hives     Childhood     SOCIAL HISTORY:  Social History     Socioeconomic History     Marital status:      Spouse name: Alyssa     Number of children: 3     Years of education: Not on file     Highest education level: Not on file   Occupational History     Occupation: certified      Employer: RETIRED   Tobacco Use     Smoking status: Former Smoker     Packs/day: 1.00     Years: 2.00     Pack years: 2.00     Types: Cigarettes     Quit date: 1970     Years since quittin.1     Smokeless tobacco: Never Used   Substance and Sexual Activity     Alcohol use: Yes     Alcohol/week: 1.7 standard drinks     Types: 2 Cans of beer per week     Comment: 1-2 beers weekly     Drug use: No     Sexual activity: Yes     Partners: Female     Birth control/protection: None   Other Topics Concern     Parent/sibling w/ CABG, MI or angioplasty before 65F 55M? No   Social History Narrative     Not on file     Social Determinants of Health     Financial Resource Strain: Not on file   Food Insecurity: Not on file   Transportation Needs: Not on file   Physical Activity: Not on file   Stress: Not on file   Social Connections: Not on file   Intimate Partner Violence: Not on file   Housing Stability: Not on file   No smoking. Drinks etoh occasionally.      PHYSICAL EXAM     There were no vitals taken for this visit.  Wt Readings from Last 4 Encounters:   21 87 kg (191  lb 12.8 oz)   06/14/19 86.2 kg (190 lb)   05/30/19 85.9 kg (189 lb 6.4 oz)   04/08/19 86 kg (189 lb 9 oz)           Constitutional.  Does not seem to be in any acute distress.  Eyes.  No redness or discharge noted.  Respiratory.  Speaking in full sentences.  Breathing seems comfortable without any accessory use of muscles.    Skin.  Visualized his skin does not show any obvious rashes.  Musculoskeletal.  Range of motion for visualized areas is intact.  Neurological.  Alert and oriented x3.  Psychiatric.  Mood, mentation and affect are normal.  Decision making capacity is intact.      The rest of a comprehensive physical examination is deferred due to Public Health Emergency video visit restrictions.       LABORATORY AND IMAGING STUDIES     Reviewed   7/8/2022    CBC unremarkable.  CMP unremarkable.  SPEP showed M spike 0.9.  KATARZYNA shows monoclonal IgG kappa.  Urine M spike 32.4.  KATARZYNA shows monoclonal free kappa light chain.  Serum IgG 1527.  Kappa 8.46.  Lambda 0.64.  Ratio 13.2.       ASSESSMENT AND PLAN     77 year old  male with workup of fatigue and mild anemia of inflammation in April 2018  was noted to have a monoclonal IgG Kappa paraprotein.     IgG kappa MGUS.  No evidence of myeloma.  He is on observation.   He feels well.  Overall labs are stable.  Serum IgG is stable.  Serum M spike is a stable.  Serum free kappa light chains are also stable.  There is slight increase in urine M spike of 32.4.  It has been 28 in the past as well.     As overall labs are stable and he is doing well.  We will continue to monitor labs once a year.    Return to clinic in 1 year.  I answered all of his questions to his satisfaction.  He is agreeable and comfortable with the plan.      Ana Rosa Medel MD

## 2022-08-30 NOTE — PROGRESS NOTES
Rose is a 77 year old who is being evaluated via a billable video visit.      Patient stated he is in the state of [place] for the visit today.    How would you like to obtain your AVS? MyChart  If the video visit is dropped, the invitation should be resent by: Text to cell phone: [phone]  Will anyone else be joining your video visit? No      Video-Visit Details    Video Start Time: 7:25 AM    Type of service:  Video Visit    Video End Time:7:30 AM    Originating Location (pt. Location): Home    Distant Location (provider location):  [facility]     Platform used for Video Visit: Jose Ramires, Virtual Visit Facilitator

## 2023-04-23 ENCOUNTER — HEALTH MAINTENANCE LETTER (OUTPATIENT)
Age: 78
End: 2023-04-23

## 2023-07-16 ENCOUNTER — HEALTH MAINTENANCE LETTER (OUTPATIENT)
Age: 78
End: 2023-07-16

## 2023-08-30 ENCOUNTER — LAB (OUTPATIENT)
Dept: LAB | Facility: CLINIC | Age: 78
End: 2023-08-30
Payer: COMMERCIAL

## 2023-08-30 DIAGNOSIS — D47.2 MGUS (MONOCLONAL GAMMOPATHY OF UNKNOWN SIGNIFICANCE): ICD-10-CM

## 2023-08-30 LAB
ALBUMIN SERPL BCG-MCNC: 4.4 G/DL (ref 3.5–5.2)
ALP SERPL-CCNC: 81 U/L (ref 40–129)
ALT SERPL W P-5'-P-CCNC: 23 U/L (ref 0–70)
ANION GAP SERPL CALCULATED.3IONS-SCNC: 8 MMOL/L (ref 7–15)
AST SERPL W P-5'-P-CCNC: 27 U/L (ref 0–45)
BASOPHILS # BLD AUTO: 0 10E3/UL (ref 0–0.2)
BASOPHILS NFR BLD AUTO: 0 %
BILIRUB SERPL-MCNC: 0.8 MG/DL
BUN SERPL-MCNC: 21.7 MG/DL (ref 8–23)
CALCIUM SERPL-MCNC: 9.6 MG/DL (ref 8.8–10.2)
CHLORIDE SERPL-SCNC: 105 MMOL/L (ref 98–107)
CREAT SERPL-MCNC: 1.15 MG/DL (ref 0.67–1.17)
DEPRECATED HCO3 PLAS-SCNC: 26 MMOL/L (ref 22–29)
EOSINOPHIL # BLD AUTO: 0.2 10E3/UL (ref 0–0.7)
EOSINOPHIL NFR BLD AUTO: 2 %
ERYTHROCYTE [DISTWIDTH] IN BLOOD BY AUTOMATED COUNT: 12.2 % (ref 10–15)
GFR SERPL CREATININE-BSD FRML MDRD: 65 ML/MIN/1.73M2
GLUCOSE SERPL-MCNC: 82 MG/DL (ref 70–99)
HCT VFR BLD AUTO: 46.7 % (ref 40–53)
HGB BLD-MCNC: 16 G/DL (ref 13.3–17.7)
IMM GRANULOCYTES # BLD: 0 10E3/UL
IMM GRANULOCYTES NFR BLD: 0 %
LYMPHOCYTES # BLD AUTO: 1.8 10E3/UL (ref 0.8–5.3)
LYMPHOCYTES NFR BLD AUTO: 27 %
MCH RBC QN AUTO: 33 PG (ref 26.5–33)
MCHC RBC AUTO-ENTMCNC: 34.3 G/DL (ref 31.5–36.5)
MCV RBC AUTO: 96 FL (ref 78–100)
MONOCYTES # BLD AUTO: 0.5 10E3/UL (ref 0–1.3)
MONOCYTES NFR BLD AUTO: 8 %
NEUTROPHILS # BLD AUTO: 4.1 10E3/UL (ref 1.6–8.3)
NEUTROPHILS NFR BLD AUTO: 62 %
PLATELET # BLD AUTO: 185 10E3/UL (ref 150–450)
POTASSIUM SERPL-SCNC: 4.5 MMOL/L (ref 3.4–5.3)
PROT SERPL-MCNC: 7.6 G/DL (ref 6.4–8.3)
RBC # BLD AUTO: 4.85 10E6/UL (ref 4.4–5.9)
SODIUM SERPL-SCNC: 139 MMOL/L (ref 136–145)
TOTAL PROTEIN SERUM FOR ELP: 7.3 G/DL (ref 6.4–8.3)
WBC # BLD AUTO: 6.6 10E3/UL (ref 4–11)

## 2023-08-30 PROCEDURE — 84165 PROTEIN E-PHORESIS SERUM: CPT

## 2023-08-30 PROCEDURE — 86334 IMMUNOFIX E-PHORESIS SERUM: CPT

## 2023-08-30 PROCEDURE — 85025 COMPLETE CBC W/AUTO DIFF WBC: CPT

## 2023-08-30 PROCEDURE — 83521 IG LIGHT CHAINS FREE EACH: CPT

## 2023-08-30 PROCEDURE — 80053 COMPREHEN METABOLIC PANEL: CPT

## 2023-08-30 PROCEDURE — 36415 COLL VENOUS BLD VENIPUNCTURE: CPT

## 2023-08-30 PROCEDURE — 82784 ASSAY IGA/IGD/IGG/IGM EACH: CPT

## 2023-08-30 PROCEDURE — 84155 ASSAY OF PROTEIN SERUM: CPT | Mod: 59

## 2023-08-31 LAB
ALBUMIN SERPL ELPH-MCNC: 4.3 G/DL (ref 3.7–5.1)
ALPHA1 GLOB SERPL ELPH-MCNC: 0.2 G/DL (ref 0.2–0.4)
ALPHA2 GLOB SERPL ELPH-MCNC: 0.8 G/DL (ref 0.5–0.9)
B-GLOBULIN SERPL ELPH-MCNC: 0.6 G/DL (ref 0.6–1)
GAMMA GLOB SERPL ELPH-MCNC: 1.4 G/DL (ref 0.7–1.6)
IGA SERPL-MCNC: 84 MG/DL (ref 84–499)
IGG SERPL-MCNC: 1602 MG/DL (ref 610–1616)
IGM SERPL-MCNC: 52 MG/DL (ref 35–242)
KAPPA LC FREE SER-MCNC: 10.91 MG/DL (ref 0.33–1.94)
KAPPA LC FREE/LAMBDA FREE SER NEPH: 13.3 {RATIO} (ref 0.26–1.65)
LAMBDA LC FREE SERPL-MCNC: 0.82 MG/DL (ref 0.57–2.63)
M PROTEIN SERPL ELPH-MCNC: 1 G/DL
PROT PATTERN SERPL ELPH-IMP: ABNORMAL
PROT PATTERN SERPL IFE-IMP: NORMAL

## 2023-09-05 PROCEDURE — 84166 PROTEIN E-PHORESIS/URINE/CSF: CPT

## 2023-09-05 PROCEDURE — 81050 URINALYSIS VOLUME MEASURE: CPT

## 2023-09-05 PROCEDURE — 86335 IMMUNFIX E-PHORSIS/URINE/CSF: CPT

## 2023-09-06 LAB
ALBUMIN MFR UR ELPH: 12.2 %
ALPHA1 GLOB MFR UR ELPH: 9.3 %
ALPHA2 GLOB MFR UR ELPH: 11.1 %
B-GLOBULIN MFR UR ELPH: 12.2 %
GAMMA GLOB MFR UR ELPH: 55.2 %
M PROTEIN MFR UR ELPH: 40.8 %
PROT ELPH PNL UR ELPH: NORMAL
PROT PATTERN UR ELPH-IMP: ABNORMAL

## 2023-09-12 ENCOUNTER — VIRTUAL VISIT (OUTPATIENT)
Dept: ONCOLOGY | Facility: CLINIC | Age: 78
End: 2023-09-12
Payer: COMMERCIAL

## 2023-09-12 VITALS — WEIGHT: 178 LBS | BODY MASS INDEX: 24.11 KG/M2 | HEIGHT: 72 IN

## 2023-09-12 DIAGNOSIS — D47.2 MGUS (MONOCLONAL GAMMOPATHY OF UNKNOWN SIGNIFICANCE): Primary | ICD-10-CM

## 2023-09-12 PROCEDURE — 99213 OFFICE O/P EST LOW 20 MIN: CPT | Mod: VID | Performed by: INTERNAL MEDICINE

## 2023-09-12 ASSESSMENT — PAIN SCALES - GENERAL: PAINLEVEL: NO PAIN (0)

## 2023-09-12 NOTE — PROGRESS NOTES
Virtual Visit Details    Type of service:  Video Visit     Originating Location (pt. Location): Home    Distant Location (provider location):  Off-site  Platform used for Video Visit: [employer]  Video start time. 8:57 AM  Video stop time. 9:05 AM       FOLLOW-UP VISIT NOTE    PATIENT NAME: Jaron Carrera MRN # [id number]  DATE OF VISIT: Sep 12, 2023 YOB: 1945    REFERRING PROVIDER: Cirilo Spain PA-C  [address] [place] [address]    Reason for follow up   Monoclonal gammopathy    HISTORY:  He was being followed by Dr. Leary but now he has transferred his care to me.  I have reviewed his previous records and have copied and updated from prior notes.    78 year old  male with no significant past medical history who in April 2018 presented to primary care provider with complaints of fatigue. He was noted to have mild anemia was further workup was ordered including protein electrophoresis which revealed monoclonal paraprotein along with elevated ferritin and CRP. Subsequently serum immunofixation and urine immunofixation were requested which revealed 1.3 g/ dl IgG kappa paraprotein with IgG 1880 mg/dl.    05/21/18 negative for evidence of end organ damage( CRAB criteria)  bone marrow biopsy showing 2-3% plasma cells. 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    SUBJECTIVE     This is a video visit.  He feels well.  He feels about the same as before with decent energy.  Denies B symptoms.  No infections or shortness of breath.  No new swellings.  Denies neuropathy.    ROS:  Rest of the comprehensive review of the system was essentially unremarkable.    I reviewed other hx in Epic as below      PAST MEDICAL HISTORY     Past Medical History:   Diagnosis Date    Vitamin B12 deficiency     Vitamin D deficiencies          CURRENT OUTPATIENT MEDICATIONS     Current Outpatient Medications   Medication  Sig Dispense Refill    ASPIRIN 81 MG PO TABS 1 TABLET DAILY      Ferrous Sulfate (IRON SUPPLEMENT PO) Take 45 mg by mouth twice a week       VITAMIN B-12 PO Take one tablet every other day      VITAMIN D PO Take one tablet by mouth daily       FAMILY HX:  Family History   Problem Relation Age of Onset    Respiratory Father         COPD    Lipids Brother     Prostate Cancer Brother     Other Cancer Brother     Thyroid Disease Daughter     C.A.D. No family hx of     Cancer - colorectal No family hx of     Cerebrovascular Disease No family hx of     Diabetes No family hx of      1 brother had multiple myeloma- he had exposure to agent orange.  Another brother also has MGUS and he also is on observation.  3 daughters are healthy       ALLERGIES     Allergies   Allergen Reactions    Pcn [Penicillins] Hives     Childhood     SOCIAL HISTORY:  Social History     Socioeconomic History    Marital status:      Spouse name: Alyssa    Number of children: 3    Years of education: Not on file    Highest education level: Not on file   Occupational History    Occupation: certified      Employer: RETIRED   Tobacco Use    Smoking status: Former     Packs/day: 1.00     Years: 2.00     Pack years: 2.00     Types: Cigarettes     Quit date: 1970     Years since quittin.1    Smokeless tobacco: Never   Substance and Sexual Activity    Alcohol use: Yes     Alcohol/week: 1.7 standard drinks of alcohol     Types: 2 Cans of beer per week     Comment: 1-2 beers weekly    Drug use: No    Sexual activity: Yes     Partners: Female     Birth control/protection: None   Other Topics Concern    Parent/sibling w/ CABG, MI or angioplasty before 65F 55M? No   Social History Narrative    Not on file     Social Determinants of Health     Financial Resource Strain: Not on file   Food Insecurity: Not on file   Transportation Needs: Not on file   Physical Activity: Not on file   Stress: Not on file   Social Connections: Not on file    Intimate Partner Violence: Not on file   Housing Stability: Not on file   No smoking. Drinks etoh occasionally.      PHYSICAL EXAM     There were no vitals taken for this visit.  Wt Readings from Last 4 Encounters:   05/24/21 87 kg (191 lb 12.8 oz)   06/14/19 86.2 kg (190 lb)   05/30/19 85.9 kg (189 lb 6.4 oz)   04/08/19 86 kg (189 lb 9 oz)           Constitutional.  Does not seem to be in any acute distress.  Eyes.  No redness or discharge noted.  Respiratory.  Speaking in full sentences.  Breathing seems comfortable without any accessory use of muscles.    Skin.  Visualized his skin does not show any obvious rashes.  Musculoskeletal.  Range of motion for visualized areas is intact.  Neurological.  Alert and oriented x3.  Psychiatric.  Mood, mentation and affect are normal.  Decision making capacity is intact.      The rest of a comprehensive physical examination is deferred due to [facility] Emergency video visit restrictions.       LABORATORY AND IMAGING STUDIES     Reviewed   8/30/2023    CBC unremarkable.  CMP unremarkable.  SPEP showed M spike 1.  KATARZYNA shows monoclonal IgG kappa.  Urine M spike 40.8.  KATARZYNA shows monoclonal free kappa light chain.  Serum IgG 1602.  IgA 84.  IgM 52  Kappa 10.91.  Lambda 0.82.  Ratio 13.3.       ASSESSMENT AND PLAN     78 year old  male with workup of fatigue and mild anemia of inflammation in April 2018  was noted to have a monoclonal IgG Kappa paraprotein.     IgG kappa MGUS.  No evidence of myeloma.    He has remained on observation and clinically doing well.  We have noticed some increase in the urine monoclonal protein/M spike.  It is now 40.8.    Other labs are stable.  He feels well.  We have been checking his labs once a year but because of the increase in urinary M spike, I would recommend repeating the test in 6 months rather than 1 year.  If this continues to go up significantly, then I would consider repeat bone marrow biopsy/PET scan.      I will see him back in 6  months with labs prior.      I answered all of his questions to his satisfaction.  He is agreeable and comfortable with the plan.      Ana Rosa Medel MD

## 2023-09-12 NOTE — PATIENT INSTRUCTIONS
Labs including 24 hr urine test in 6 months and see me a week after that  
Observation for blood transfusion, Dr. Simon khanna.

## 2023-09-12 NOTE — LETTER
9/12/2023         RE: Jaron Carrera  [address] [place] [address]        Dear Colleague,    Thank you for referring your patient, Jaron Carrera, to the [facility]. Please see a copy of my visit note below.    Virtual Visit Details    Type of service:  Video Visit     Originating Location (pt. Location): Home    Distant Location (provider location):  Off-site  Platform used for Video Visit: [employer]  Video start time. 8:57 AM  Video stop time. 9:05 AM       FOLLOW-UP VISIT NOTE    PATIENT NAME: Jaron Carrera MRN # [id number]  DATE OF VISIT: Sep 12, 2023 YOB: 1945    REFERRING PROVIDER: Cirilo Spain PA-C  [address] [place] [address]    Reason for follow up   Monoclonal gammopathy    HISTORY:  He was being followed by Dr. Leary but now he has transferred his care to me.  I have reviewed his previous records and have copied and updated from prior notes.    78 year old  male with no significant past medical history who in April 2018 presented to primary care provider with complaints of fatigue. He was noted to have mild anemia was further workup was ordered including protein electrophoresis which revealed monoclonal paraprotein along with elevated ferritin and CRP. Subsequently serum immunofixation and urine immunofixation were requested which revealed 1.3 g/ dl IgG kappa paraprotein with IgG 1880 mg/dl.    05/21/18 negative for evidence of end organ damage( CRAB criteria)  bone marrow biopsy showing 2-3% plasma cells. 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    SUBJECTIVE     This is a video visit.  He feels well.  He feels about the same as before with decent energy.  Denies B symptoms.  No infections or shortness of breath.  No new swellings.  Denies neuropathy.    ROS:  Rest of the comprehensive review of the system was essentially  unremarkable.    I reviewed other hx in Epic as below      PAST MEDICAL HISTORY     Past Medical History:   Diagnosis Date     Vitamin B12 deficiency      Vitamin D deficiencies          CURRENT OUTPATIENT MEDICATIONS     Current Outpatient Medications   Medication Sig Dispense Refill     ASPIRIN 81 MG PO TABS 1 TABLET DAILY       Ferrous Sulfate (IRON SUPPLEMENT PO) Take 45 mg by mouth twice a week        VITAMIN B-12 PO Take one tablet every other day       VITAMIN D PO Take one tablet by mouth daily       FAMILY HX:  Family History   Problem Relation Age of Onset     Respiratory Father         COPD     Lipids Brother      Prostate Cancer Brother      Other Cancer Brother      Thyroid Disease Daughter      C.A.D. No family hx of      Cancer - colorectal No family hx of      Cerebrovascular Disease No family hx of      Diabetes No family hx of      1 brother had multiple myeloma- he had exposure to agent orange.  Another brother also has MGUS and he also is on observation.  3 daughters are healthy       ALLERGIES     Allergies   Allergen Reactions     Pcn [Penicillins] Hives     Childhood     SOCIAL HISTORY:  Social History     Socioeconomic History     Marital status:      Spouse name: Alyssa     Number of children: 3     Years of education: Not on file     Highest education level: Not on file   Occupational History     Occupation: certified      Employer: RETIRED   Tobacco Use     Smoking status: Former     Packs/day: 1.00     Years: 2.00     Pack years: 2.00     Types: Cigarettes     Quit date: 1970     Years since quittin.1     Smokeless tobacco: Never   Substance and Sexual Activity     Alcohol use: Yes     Alcohol/week: 1.7 standard drinks of alcohol     Types: 2 Cans of beer per week     Comment: 1-2 beers weekly     Drug use: No     Sexual activity: Yes     Partners: Female     Birth control/protection: None   Other Topics Concern     Parent/sibling w/ CABG, MI or angioplasty  before 65F 55M? No   Social History Narrative     Not on file     Social Determinants of Health     Financial Resource Strain: Not on file   Food Insecurity: Not on file   Transportation Needs: Not on file   Physical Activity: Not on file   Stress: Not on file   Social Connections: Not on file   Intimate Partner Violence: Not on file   Housing Stability: Not on file   No smoking. Drinks etoh occasionally.      PHYSICAL EXAM     There were no vitals taken for this visit.  Wt Readings from Last 4 Encounters:   05/24/21 87 kg (191 lb 12.8 oz)   06/14/19 86.2 kg (190 lb)   05/30/19 85.9 kg (189 lb 6.4 oz)   04/08/19 86 kg (189 lb 9 oz)           Constitutional.  Does not seem to be in any acute distress.  Eyes.  No redness or discharge noted.  Respiratory.  Speaking in full sentences.  Breathing seems comfortable without any accessory use of muscles.    Skin.  Visualized his skin does not show any obvious rashes.  Musculoskeletal.  Range of motion for visualized areas is intact.  Neurological.  Alert and oriented x3.  Psychiatric.  Mood, mentation and affect are normal.  Decision making capacity is intact.      The rest of a comprehensive physical examination is deferred due to Public Health Emergency video visit restrictions.       LABORATORY AND IMAGING STUDIES     Reviewed   8/30/2023    CBC unremarkable.  CMP unremarkable.  SPEP showed M spike 1.  KATARZYNA shows monoclonal IgG kappa.  Urine M spike 40.8.  KATARZYNA shows monoclonal free kappa light chain.  Serum IgG 1602.  IgA 84.  IgM 52  Kappa 10.91.  Lambda 0.82.  Ratio 13.3.       ASSESSMENT AND PLAN     78 year old  male with workup of fatigue and mild anemia of inflammation in April 2018  was noted to have a monoclonal IgG Kappa paraprotein.     IgG kappa MGUS.  No evidence of myeloma.    He has remained on observation and clinically doing well.  We have noticed some increase in the urine monoclonal protein/M spike.  It is now 40.8.    Other labs are stable.  He feels  well.  We have been checking his labs once a year but because of the increase in urinary M spike, I would recommend repeating the test in 6 months rather than 1 year.  If this continues to go up significantly, then I would consider repeat bone marrow biopsy/PET scan.      I will see him back in 6 months with labs prior.      I answered all of his questions to his satisfaction.  He is agreeable and comfortable with the plan.      Ana Rosa Medel MD         Again, thank you for allowing me to participate in the care of your patient.        Sincerely,        Ana Rosa Medel MD

## 2023-09-12 NOTE — NURSING NOTE
Is the patient currently in the state of [place]? YES    Visit mode:VIDEO    If the visit is dropped, the patient can be reconnected by: VIDEO VISIT: Text to cell phone:   Telephone Information:   Mobile [phone]     Will anyone else be joining the visit? NO    How would you like to obtain your AVS? MyChart    Are changes needed to the allergy or medication list? Pt stated no changes to allergies and Pt stated no med changes    Reason for visit: RECHECK (1 year follow up)    Maribell Carrasco LPN

## 2023-11-09 ENCOUNTER — OFFICE VISIT (OUTPATIENT)
Dept: FAMILY MEDICINE | Facility: CLINIC | Age: 78
End: 2023-11-09
Payer: COMMERCIAL

## 2023-11-09 VITALS
HEART RATE: 82 BPM | RESPIRATION RATE: 12 BRPM | DIASTOLIC BLOOD PRESSURE: 64 MMHG | BODY MASS INDEX: 24.95 KG/M2 | HEIGHT: 72 IN | TEMPERATURE: 97.1 F | WEIGHT: 184.2 LBS | OXYGEN SATURATION: 100 % | SYSTOLIC BLOOD PRESSURE: 134 MMHG

## 2023-11-09 DIAGNOSIS — K59.00 CONSTIPATION, UNSPECIFIED CONSTIPATION TYPE: ICD-10-CM

## 2023-11-09 DIAGNOSIS — N30.01 ACUTE CYSTITIS WITH HEMATURIA: ICD-10-CM

## 2023-11-09 DIAGNOSIS — N32.89 BLADDER IRRITABILITY: ICD-10-CM

## 2023-11-09 DIAGNOSIS — D47.2 MGUS (MONOCLONAL GAMMOPATHY OF UNKNOWN SIGNIFICANCE): ICD-10-CM

## 2023-11-09 DIAGNOSIS — R39.9 URINARY SYMPTOM OR SIGN: Primary | ICD-10-CM

## 2023-11-09 LAB
ALBUMIN UR-MCNC: NEGATIVE MG/DL
APPEARANCE UR: CLEAR
BACTERIA #/AREA URNS HPF: NORMAL /HPF
BILIRUB UR QL STRIP: NEGATIVE
COLOR UR AUTO: YELLOW
GLUCOSE UR STRIP-MCNC: NEGATIVE MG/DL
HGB UR QL STRIP: ABNORMAL
KETONES UR STRIP-MCNC: NEGATIVE MG/DL
LEUKOCYTE ESTERASE UR QL STRIP: NEGATIVE
NITRATE UR QL: NEGATIVE
PH UR STRIP: 6 [PH] (ref 5–7)
RBC #/AREA URNS AUTO: NORMAL /HPF
SP GR UR STRIP: 1.01 (ref 1–1.03)
UROBILINOGEN UR STRIP-ACNC: 0.2 E.U./DL
WBC #/AREA URNS AUTO: NORMAL /HPF

## 2023-11-09 PROCEDURE — 81001 URINALYSIS AUTO W/SCOPE: CPT | Performed by: NURSE PRACTITIONER

## 2023-11-09 PROCEDURE — 99214 OFFICE O/P EST MOD 30 MIN: CPT | Performed by: NURSE PRACTITIONER

## 2023-11-09 RX ORDER — POLYETHYLENE GLYCOL 3350 17 G/17G
1 POWDER, FOR SOLUTION ORAL DAILY
Qty: 578 G | Refills: 3 | Status: SHIPPED | OUTPATIENT
Start: 2023-11-09 | End: 2023-11-27

## 2023-11-09 RX ORDER — SULFAMETHOXAZOLE/TRIMETHOPRIM 800-160 MG
1 TABLET ORAL 2 TIMES DAILY
Qty: 14 TABLET | Refills: 0 | Status: SHIPPED | OUTPATIENT
Start: 2023-11-09 | End: 2023-11-16

## 2023-11-09 ASSESSMENT — PAIN SCALES - GENERAL: PAINLEVEL: MILD PAIN (3)

## 2023-11-09 NOTE — PROGRESS NOTES
"  Assessment & Plan     Urinary symptom or sign    - UA with Microscopic reflex to Culture - lab collect; Future  - UA with Microscopic reflex to Culture - lab collect  - UA Microscopic with Reflex to Culture    Bladder irritability    - UA with Microscopic reflex to Culture - lab collect; Future  - UA with Microscopic reflex to Culture - lab collect  - sulfamethoxazole-trimethoprim (BACTRIM DS) 800-160 MG tablet; Take 1 tablet by mouth 2 times daily for 7 days With daily yogurt or probiotics  - UA Microscopic with Reflex to Culture  - Adult Urology  Referral; Future    Acute cystitis with hematuria    - sulfamethoxazole-trimethoprim (BACTRIM DS) 800-160 MG tablet; Take 1 tablet by mouth 2 times daily for 7 days With daily yogurt or probiotics  - Adult Urology  Referral; Future    Constipation, unspecified constipation type    - polyethylene glycol (MIRALAX) 17 GM/Dose powder; Take 17 g (1 Capful) by mouth daily Drink 7-12 glasses of water daily. If medication not helping, use one cap twice daily. If change in stool does not resolve; need colonoscopy. Let me know if this is the case.    Review of external notes as documented elsewhere in note  Ordering of each unique test  Prescription drug management  30 minutes spent by me on the date of the encounter doing chart review, history and exam, documentation and further activities per the note     BMI:   Estimated body mass index is 25.31 kg/m  as calculated from the following:    Height as of this encounter: 1.817 m (5' 11.54\").    Weight as of this encounter: 83.6 kg (184 lb 3.2 oz).       CONSULTATION/REFERRAL to urology  See Patient Instructions    MIKE MONAE  [facility] AKIL Rose is a 78 year old, presenting for the following health issues:  bladder concern        11/9/2023    10:07 AM   Additional Questions   Roomed by Iris   Accompanied by n/a     She reports burning over his bladder for " more than a week.  He denies pain with urination.  He denies color or odor change of urine.  He denies risk for sexually transmitted infection.  He denies penile discharge or rash.  He denies change in urinary symptoms including dribbling or incontinence.  He does admit to urinary frequency during the day urinating every few hours however he is able to hold his urine at night.  He denies fever he reports he has had some chills.  No travel.  No one with similar symptoms in his home.  He had kidney stones years ago but he is not having pain with urination.  He denies back pain.  He reports history of MGUS.  Looking through his chart we discussed ongoing hematuria in his urine.  Based off labs today we discussed cystitis; since symptoms are not improving we discussed trying antibiotics.  We discussed risks of antibiotics he would like to try treatment today.  Discussed eating daily yogurt or taking probiotics when on antibiotics.  Discussed seeing urologist for evaluation of ongoing hematuria and bladder symptoms present.  Risks of sepsis discussed.  Discussed constipation can cause urinary symptoms as well.  Discussed treating constipation with daily MiraLAX and increased water.  Tips given on after visit summary.  He has been having hard, narrow stools.  If stool changes persist discussed possible need for colonoscopy.  Discussed risks of laxatives and how MiraLAX works.  He is in agreement to the above.  Declining vaccines today.    History of Present Illness       Reason for visit:  Stomach Pain  Symptom onset:  1-2 weeks ago  Symptoms include:  Burning Pain and frequent urination  Symptom intensity:  Moderate  Symptom progression:  Staying the same  Had these symptoms before:  No  What makes it worse:  No  What makes it better:  No    He eats 2-3 servings of fruits and vegetables daily.He consumes 1 sweetened beverage(s) daily.He exercises with enough effort to increase his heart rate 30 to 60 minutes per day.  He  "exercises with enough effort to increase his heart rate 5 days per week.   He is taking medications regularly.       Review of Systems   Constitutional, HEENT, cardiovascular, pulmonary, GI, , musculoskeletal, neuro, skin, endocrine and psych systems are negative, except as otherwise noted.      Objective    /64   Pulse 82   Temp 97.1  F (36.2  C) (Temporal)   Resp 12   Ht 1.817 m (5' 11.54\")   Wt 83.6 kg (184 lb 3.2 oz)   SpO2 100%   BMI 25.31 kg/m    Body mass index is 25.31 kg/m .  Physical Exam   GENERAL: healthy, alert and no distress  RESP: lungs clear to auscultation - no rales, rhonchi or wheezes  CV: regular rate and rhythm, normal S1 S2, no S3 or S4, no murmur, click or rub, no peripheral edema and peripheral pulses strong  ABDOMEN: soft, no hepatosplenomegaly, no masses and bowel sounds normal POSTIIVE for tenderness with palpation over bladder  MS: no gross musculoskeletal defects noted, no edema, no CVA tenderness  SKIN: no suspicious rashes  NEURO: mentation intact and speech normal  PSYCH: mentation appears normal, affect normal/bright    See orders              "

## 2023-11-09 NOTE — PATIENT INSTRUCTIONS
Drink 7-12 glasses of water daily. If medication not helping, use one cap twice daily. If change in stool does not resolve; need colonoscopy. Let me know if this is the case.

## 2023-11-26 ASSESSMENT — ENCOUNTER SYMPTOMS
MYALGIAS: 0
PALPITATIONS: 0
HEMATURIA: 0
HEMATOCHEZIA: 0
ARTHRALGIAS: 0
SORE THROAT: 0
DIARRHEA: 0
CONSTIPATION: 0
SHORTNESS OF BREATH: 0
COUGH: 0
CHILLS: 0
NERVOUS/ANXIOUS: 0
PARESTHESIAS: 0
ABDOMINAL PAIN: 0
WEAKNESS: 0
FEVER: 0
DYSURIA: 0
FREQUENCY: 0
HEADACHES: 0
NAUSEA: 0
EYE PAIN: 0
DIZZINESS: 0
JOINT SWELLING: 0
HEARTBURN: 0

## 2023-11-26 ASSESSMENT — ACTIVITIES OF DAILY LIVING (ADL): CURRENT_FUNCTION: NO ASSISTANCE NEEDED

## 2023-11-27 ENCOUNTER — OFFICE VISIT (OUTPATIENT)
Dept: FAMILY MEDICINE | Facility: CLINIC | Age: 78
End: 2023-11-27
Payer: COMMERCIAL

## 2023-11-27 VITALS
OXYGEN SATURATION: 100 % | RESPIRATION RATE: 20 BRPM | BODY MASS INDEX: 25.65 KG/M2 | SYSTOLIC BLOOD PRESSURE: 126 MMHG | HEIGHT: 71 IN | WEIGHT: 183.2 LBS | TEMPERATURE: 97.7 F | HEART RATE: 91 BPM | DIASTOLIC BLOOD PRESSURE: 72 MMHG

## 2023-11-27 DIAGNOSIS — Z00.00 ENCOUNTER FOR MEDICARE ANNUAL WELLNESS EXAM: ICD-10-CM

## 2023-11-27 DIAGNOSIS — Z00.00 ENCOUNTER FOR ANNUAL WELLNESS EXAM IN MEDICARE PATIENT: Primary | ICD-10-CM

## 2023-11-27 DIAGNOSIS — Z12.5 SCREENING FOR PROSTATE CANCER: ICD-10-CM

## 2023-11-27 LAB — PSA SERPL DL<=0.01 NG/ML-MCNC: 0.25 NG/ML (ref 0–6.5)

## 2023-11-27 PROCEDURE — 36415 COLL VENOUS BLD VENIPUNCTURE: CPT | Performed by: PHYSICIAN ASSISTANT

## 2023-11-27 PROCEDURE — G0103 PSA SCREENING: HCPCS | Performed by: PHYSICIAN ASSISTANT

## 2023-11-27 PROCEDURE — 99397 PER PM REEVAL EST PAT 65+ YR: CPT | Performed by: PHYSICIAN ASSISTANT

## 2023-11-27 ASSESSMENT — ENCOUNTER SYMPTOMS
COUGH: 0
SORE THROAT: 0
MYALGIAS: 0
PALPITATIONS: 0
HEARTBURN: 0
PARESTHESIAS: 0
DIZZINESS: 0
HEADACHES: 0
CONSTIPATION: 0
ARTHRALGIAS: 0
NERVOUS/ANXIOUS: 0
WEAKNESS: 0
DYSURIA: 0
FEVER: 0
SHORTNESS OF BREATH: 0
DIARRHEA: 0
CHILLS: 0
EYE PAIN: 0
ABDOMINAL PAIN: 0
HEMATOCHEZIA: 0
JOINT SWELLING: 0
NAUSEA: 0
FREQUENCY: 0
HEMATURIA: 0

## 2023-11-27 ASSESSMENT — ACTIVITIES OF DAILY LIVING (ADL): CURRENT_FUNCTION: NO ASSISTANCE NEEDED

## 2023-11-27 ASSESSMENT — PAIN SCALES - GENERAL: PAINLEVEL: NO PAIN (0)

## 2023-11-27 NOTE — PROGRESS NOTES
"SUBJECTIVE:   Rose is a 78 year old, presenting for the following:  Wellness Visit and Health Maintenance (Patient is not fasting/Patient declines flu shot, getting it tomorrow)        11/27/2023    10:24 AM   Additional Questions   Roomed by Khadijah Rodas CMA   Accompanied by none         11/27/2023    10:24 AM   Patient Reported Additional Medications   Patient reports taking the following new medications none       Are you in the first 12 months of your Medicare coverage?  No    Healthy Habits:     In general, how would you rate your overall health?  Good    Frequency of exercise:  6-7 days/week    Duration of exercise:  45-60 minutes    Do you usually eat at least 4 servings of fruit and vegetables a day, include whole grains    & fiber and avoid regularly eating high fat or \"junk\" foods?  Yes    Taking medications regularly:  Yes    Medication side effects:  Not applicable    Ability to successfully perform activities of daily living:  No assistance needed    Home Safety:  No safety concerns identified    Hearing Impairment:  No hearing concerns    In the past 6 months, have you been bothered by leaking of urine?  No    In general, how would you rate your overall mental or emotional health?  Excellent    Additional concerns today:  No          Have you ever done Advance Care Planning? (For example, a Health Directive, POLST, or a discussion with a medical provider or your loved ones about your wishes): Yes, patient states has an Advance Care Planning document and will bring a copy to the clinic.       Fall risk  Fallen 2 or more times in the past year?: No  Any fall with injury in the past year?: No    Cognitive Screening - patient declined \"not necessary\"    Do you have sleep apnea, excessive snoring or daytime drowsiness? : no    Reviewed and updated as needed this visit by clinical staff   Tobacco  Allergies  Meds              Reviewed and updated as needed this visit by Provider               "   Social History     Tobacco Use    Smoking status: Former     Packs/day: 1.00     Years: 2.00     Additional pack years: 0.00     Total pack years: 2.00     Types: Cigarettes     Quit date: 1970     Years since quittin.3    Smokeless tobacco: Never   Substance Use Topics    Alcohol use: Yes     Alcohol/week: 1.7 standard drinks of alcohol     Types: 2 Cans of beer per week     Comment: 1-2 beers weekly             2023     3:24 PM   Alcohol Use   Prescreen: >3 drinks/day or >7 drinks/week? No     Do you have a current opioid prescription? No  Do you use any other controlled substances or medications that are not prescribed by a provider? None      Current providers sharing in care for this patient include:   Patient Care Team:  Cirilo Spain PA-C as PCP - General (Physician Assistant)  Ana Rosa Medel MD as Assigned Cancer Care Provider  Noy Richard NP as Assigned PCP  Gabo Lorenzo MD as MD (Urology)    The following health maintenance items are reviewed in Epic and correct as of today:  Health Maintenance   Topic Date Due    RSV VACCINE (Pregnancy & 60+) (1 - 1-dose 60+ series) Never done    DTAP/TDAP/TD IMMUNIZATION (1 - Tdap) 2010    MEDICARE ANNUAL WELLNESS VISIT  2022    INFLUENZA VACCINE (1) 2023    ANNUAL REVIEW OF HM ORDERS  2024    FALL RISK ASSESSMENT  2024    LIPID  [id number]    ADVANCE CARE PLANNING  2028    HEPATITIS C SCREENING  Completed    PHQ-2 (once per calendar year)  Completed    Pneumococcal Vaccine: 65+ Years  Completed    ZOSTER IMMUNIZATION  Completed    COVID-19 Vaccine  Completed    IPV IMMUNIZATION  Aged Out    HPV IMMUNIZATION  Aged Out    MENINGITIS IMMUNIZATION  Aged Out    RSV MONOCLONAL ANTIBODY  Aged Out    COLORECTAL CANCER SCREENING  Discontinued    LUNG CANCER SCREENING  Discontinued     Lab work is in process  Labs reviewed in EPIC  BP Readings from Last 3 Encounters:   23 126/72   23 134/64    21 130/64    Wt Readings from Last 3 Encounters:   23 83.1 kg (183 lb 3.2 oz)   23 83.6 kg (184 lb 3.2 oz)   23 80.7 kg (178 lb)                  Patient Active Problem List   Diagnosis    Vitamin D deficiency    Vitamin B12 deficiency    CARDIOVASCULAR SCREENING; LDL GOAL LESS THAN 160    Advanced directives, counseling/discussion    Pure hyperglyceridemia    Screening for prostate cancer    MGUS (monoclonal gammopathy of unknown significance)    Pneumothorax, closed, traumatic, initial encounter     Past Surgical History:   Procedure Laterality Date    BIOPSY  ?    Bone Marrow    COLONOSCOPY  2013    Procedure: COLONOSCOPY;  COLONOSCOPY SCREEN;  Surgeon: Walker Thompson MD;  Location: [facility] OR    HC ESOPHAGOSCOPY, DIAGNOSTIC      ORTHOPEDIC SURGERY  ?    Shoulder Surgery    SURGICAL HISTORY OF -   1995    Varicose Veins- Stripping     SURGICAL HISTORY OF -   1965    Lt Knee Surgery torn MM        Social History     Tobacco Use    Smoking status: Former     Packs/day: 1.00     Years: 2.00     Additional pack years: 0.00     Total pack years: 2.00     Types: Cigarettes     Quit date: 1970     Years since quittin.3    Smokeless tobacco: Never   Substance Use Topics    Alcohol use: Yes     Alcohol/week: 1.7 standard drinks of alcohol     Types: 2 Cans of beer per week     Comment: 1-2 beers weekly     Family History   Problem Relation Age of Onset    Respiratory Father         COPD    Lipids Brother     Prostate Cancer Brother     Other Cancer Brother     Thyroid Disease Daughter     Hypertension Brother     Prostate Cancer Brother     C.A.D. No family hx of     Cancer - colorectal No family hx of     Cerebrovascular Disease No family hx of     Diabetes No family hx of          Current Outpatient Medications   Medication Sig Dispense Refill    Ferrous Sulfate (IRON SUPPLEMENT PO) Take 45 mg by mouth twice a week       VITAMIN B-12 PO Take one tablet  every other day      VITAMIN D PO Take one tablet by mouth daily      polyethylene glycol (MIRALAX) 17 GM/Dose powder Take 17 g (1 Capful) by mouth daily Drink 7-12 glasses of water daily. If medication not helping, use one cap twice daily. If change in stool does not resolve; need colonoscopy. Let me know if this is the case. 578 g 3     Allergies   Allergen Reactions    Pcn [Penicillins] Hives     Childhood     Recent Labs   Lab Test 08/30/23  0908 07/08/22  0834 07/12/21  0831 07/12/21  0831 05/24/21  0953 07/07/20  0906 03/28/19  0857 05/21/18  1510 04/24/18  1212 09/22/17  1041 10/18/16  0805 10/14/15  0839   LDL  --   --   --   --  96  --   --   --   --   --  110* 117   HDL  --   --   --   --  48  --   --   --   --   --  50 49   TRIG  --   --   --   --  153*  --   --   --   --   --  211* 185*   ALT 23 34  --  36  --  35  --   --  28   < >  --   --    CR 1.15 1.16   < > 1.17  --  1.12 1.06   < > 0.91   < >  --   --    GFRESTIMATED 65 65   < > 60*  --  64 69   < > 82   < >  --   --    GFRESTBLACK  --   --   --   --   --  74 80   < > >90   < >  --   --    POTASSIUM 4.5 4.4   < > 4.4  --  4.3 4.3   < > 4.0   < >  --   --    TSH  --   --   --   --   --   --   --   --  1.73  --   --   --     < > = values in this interval not displayed.            Review of Systems   Constitutional:  Negative for chills and fever.   HENT:  Negative for congestion, ear pain, hearing loss and sore throat.    Eyes:  Negative for pain and visual disturbance.   Respiratory:  Negative for cough and shortness of breath.    Cardiovascular:  Negative for chest pain, palpitations and peripheral edema.   Gastrointestinal:  Negative for abdominal pain, constipation, diarrhea, heartburn, hematochezia and nausea.   Genitourinary:  Negative for dysuria, frequency, genital sores, hematuria, impotence, penile discharge and urgency.   Musculoskeletal:  Negative for arthralgias, joint swelling and myalgias.   Skin:  Negative for rash.   Neurological:   "Negative for dizziness, weakness, headaches and paresthesias.   Psychiatric/Behavioral:  Negative for mood changes. The patient is not nervous/anxious.      Constitutional, HEENT, cardiovascular, pulmonary, GI, , musculoskeletal, neuro, skin, endocrine and psych systems are negative, except as otherwise noted.    OBJECTIVE:   /72   Pulse 91   Temp 97.7  F (36.5  C) (Temporal)   Resp 20   Ht 1.803 m (5' 11\")   Wt 83.1 kg (183 lb 3.2 oz)   SpO2 100%   BMI 25.55 kg/m   Estimated body mass index is 25.55 kg/m  as calculated from the following:    Height as of this encounter: 1.803 m (5' 11\").    Weight as of this encounter: 83.1 kg (183 lb 3.2 oz).  Physical Exam  GENERAL: healthy, alert and no distress  EYES: Eyes grossly normal to inspection, PERRL and conjunctivae and sclerae normal  HENT: ear canals and TM's normal, nose and mouth without ulcers or lesions  NECK: no adenopathy, no asymmetry, masses, or scars and thyroid normal to palpation  RESP: lungs clear to auscultation - no rales, rhonchi or wheezes  CV: regular rate and rhythm, normal S1 S2, no S3 or S4, no murmur, click or rub, no peripheral edema and peripheral pulses strong  ABDOMEN: soft, nontender, no hepatosplenomegaly, no masses and bowel sounds normal  MS: no gross musculoskeletal defects noted, no edema  SKIN: no suspicious lesions or rashes  NEURO: Normal strength and tone, mentation intact and speech normal  PSYCH: mentation appears normal, affect normal/bright    Diagnostic Test Results:  Labs reviewed in Epic    ASSESSMENT / PLAN:       ICD-10-CM    1. Encounter for annual wellness exam in Medicare patient  Z00.00           Patient has been advised of split billing requirements and indicates understanding: Yes      COUNSELING:  Reviewed preventive health counseling, as reflected in patient instructions       Regular exercise       Healthy diet/nutrition      BMI:   Estimated body mass index is 25.55 kg/m  as calculated from the " "following:    Height as of this encounter: 1.803 m (5' 11\").    Weight as of this encounter: 83.1 kg (183 lb 3.2 oz).         He reports that he quit smoking about 53 years ago. His smoking use included cigarettes. He has a 2.00 pack-year smoking history. He has never used smokeless tobacco.      Appropriate preventive services were discussed with this patient, including applicable screening as appropriate for fall prevention, nutrition, physical activity, Tobacco-use cessation, weight loss and cognition.  Checklist reviewing preventive services available has been given to the patient.    Reviewed patients plan of care and provided an AVS. The Basic Care Plan (routine screening as documented in Health Maintenance) for Jaron meets the Care Plan requirement. This Care Plan has been established and reviewed with the Patient.          Cirilo Spain PA-C  [facility] AKIL    Identified Health Risks:  I have reviewed Opioid Use Disorder and Substance Use Disorder risk factors and made any needed referrals.   "

## 2023-11-27 NOTE — PATIENT INSTRUCTIONS
Patient Education   Personalized Prevention Plan  You are due for the preventive services outlined below.  Your care team is available to assist you in scheduling these services.  If you have already completed any of these items, please share that information with your care team to update in your medical record.  Health Maintenance Due   Topic Date Due     RSV VACCINE (Pregnancy & 60+) (1 - 1-dose 60+ series) Never done     Diptheria Tetanus Pertussis (DTAP/TDAP/TD) Vaccine (1 - Tdap) 07/21/2010     Flu Vaccine (1) 09/01/2023

## 2024-01-11 ENCOUNTER — OFFICE VISIT (OUTPATIENT)
Dept: UROLOGY | Facility: CLINIC | Age: 79
End: 2024-01-11
Attending: NURSE PRACTITIONER
Payer: COMMERCIAL

## 2024-01-11 DIAGNOSIS — R31.29 MICROSCOPIC HEMATURIA: Primary | ICD-10-CM

## 2024-01-11 DIAGNOSIS — R39.9 LOWER URINARY TRACT SYMPTOMS (LUTS): ICD-10-CM

## 2024-01-11 PROCEDURE — 99204 OFFICE O/P NEW MOD 45 MIN: CPT | Performed by: UROLOGY

## 2024-01-11 NOTE — PROGRESS NOTES
Urology Consult History and Physical  NINANUNU AJ   Name: Jaron Carrera    MRN: [id number]   YOB: 1945       We were asked to see Jaron Carrera at the request of Dr. Spain for evaluation and treatment of microscopic hematuria .        Chief Complaint:   Microscopic hematuria     History is obtained from the patient            History of Present Illness:   Jaron Carrera is a 78 year old male who is being seen for evaluation of microscopic hematuria     He has had issues with intermittent microscopic hematuria  No prior workup for this    Nocturia 1x/night  Feels that he may not completely empty  He notes some increased frequency   Voiding every 3-4 hours  Overall he is not very bothered by his urination           Past Medical History:     Past Medical History:   Diagnosis Date    Vitamin B12 deficiency     Vitamin D deficiencies             Past Surgical History:     Past Surgical History:   Procedure Laterality Date    BIOPSY  ?    Bone Marrow    COLONOSCOPY  2013    Procedure: COLONOSCOPY;  COLONOSCOPY SCREEN;  Surgeon: Walker Thompson MD;  Location:  OR     ESOPHAGOSCOPY, DIAGNOSTIC      ORTHOPEDIC SURGERY  ?    Shoulder Surgery    SURGICAL HISTORY OF -   1995    Varicose Veins- Stripping     SURGICAL HISTORY OF -   1965    Lt Knee Surgery torn MM             Social History:     Social History     Tobacco Use    Smoking status: Former     Packs/day: 1.00     Years: 2.00     Additional pack years: 0.00     Total pack years: 2.00     Types: Cigarettes     Quit date: 1970     Years since quittin.5    Smokeless tobacco: Never   Substance Use Topics    Alcohol use: Yes     Alcohol/week: 1.7 standard drinks of alcohol     Types: 2 Cans of beer per week     Comment: 1-2 beers weekly       History   Smoking Status    Former    Packs/day: 1.00    Years: 2.00    Types: Cigarettes    Quit date: 1970   Smokeless Tobacco    Never            Family  History:     Family History   Problem Relation Age of Onset    Respiratory Father         COPD    Lipids Brother     Prostate Cancer Brother     Other Cancer Brother     Thyroid Disease Daughter     Hypertension Brother     Prostate Cancer Brother     C.A.D. No family hx of     Cancer - colorectal No family hx of     Cerebrovascular Disease No family hx of     Diabetes No family hx of               Allergies:     Allergies   Allergen Reactions    Pcn [Penicillins] Hives     Childhood            Medications:     Current Outpatient Medications   Medication Sig    Ferrous Sulfate (IRON SUPPLEMENT PO) Take 45 mg by mouth twice a week     VITAMIN B-12 PO Take one tablet every other day    VITAMIN D PO Take one tablet by mouth daily     No current facility-administered medications for this visit.             Review of Systems:     Reviewed and negative except for as noted above          Physical Exam:   No data found.  There is no height or weight on file to calculate BMI.     General: age-appropriate appearing male in NAD  HEENT: Head AT/NC, EOMI, CN Grossly intact  Lungs: no respiratory distress, or pursed lip breathing  Heart: No obvious jugular venous distension present  Lymph: no palpable inguinal lymphadenopathy.  LE: no edema.   Musculoskeltal: extremities normal, no peripheral edema  Skin: no suspicious lesions or rashes  Neuro: Alert, oriented, speech and mentation normal;  moving all 4 extremities equally.  Psych: affect and mood normal          Data:   All laboratory data reviewed:    UA RESULTS:  Recent Labs   Lab Test 11/09/23  1040   COLOR Yellow   APPEARANCE Clear   URINEGLC Negative   URINEBILI Negative   URINEKETONE Negative   SG 1.010   UBLD Trace*   URINEPH 6.0   PROTEIN Negative   UROBILINOGEN 0.2   NITRITE Negative   LEUKEST Negative   RBCU 0-2   WBCU 0-5      Component      Latest Ref Rng 12/13/2012 9/22/2017 11/16/2018   Color Urine Yellow  Yellow  Yellow    Appearance Urine Clear  Clear  Clear     Glucose Urine      NEG^Negative mg/dL Negative  Negative  Negative    Bilirubin Urine      NEG^Negative  Negative  Negative  Negative    Ketones Urine      NEG^Negative mg/dL Negative  Negative  Negative    Specific Gravity Urine      1.003 - 1.035  1.015  <=1.005  1.030    Blood Urine      NEG^Negative  Trace !  Small !  Small !    pH Urine      5.0 - 7.0 pH 5.5  5.5  5.0    Protein Albumin Urine      NEG^Negative mg/dL Negative  Negative  Negative    Urobilinogen mg/dL      0.0 - 2.0 mg/dL   2.0    Nitrite Urine      NEG^Negative  Negative  Negative  Negative    Leukocyte Esterase Urine      NEG^Negative  Negative  Negative  Negative    Source Midstream Urine  Midstream Urine  Midstream Urine    RBC Urine      0-2 /HPF /HPF O - 2  O - 2  9 (H)    WBC Urine      0-5 /HPF /HPF O - 2  O - 2  <1    Mucous Urine      NEG^Negative /LPF   Present !    Urobilinogen Urine      0.2 - 1.0 EU/dL 0.2  0.2     Squamous Epithelial /LPF Urine      FEW /LPF Few      Bacteria Urine      None Seen /HPF          PSA   Date Value Ref Range Status   05/24/2021 0.27 0 - 4 ug/L Final     Comment:     Assay Method:  Chemiluminescence using Siemens Vista analyzer     Prostate Specific Antigen Screen   Date Value Ref Range Status   11/27/2023 0.25 0.00 - 6.50 ng/mL Final      Lab Results   Component Value Date    CR 1.15 08/30/2023    CR 1.12 07/07/2020      IMAGING:  All pertinent imaging reviewed:    All imaging studies reviewed by me.  I personally reviewed these imaging films.  A formal report from radiology will follow.    FINDINGS:      Chest: The heart size is normal.No enlarged lymph node or other  abnormal mediastinal mass is seen. There is calcification within the  thoracic aorta and coronary arteries. There is mild dependent  atelectasis of both lungs. The lungs are otherwise clear. There is a  small left pneumothorax over the left lung apex and to a much lesser  degree posterior to the left lower lobe. No right pneumothorax  is  seen. No pleural effusion is identified. There are degenerative  changes of the spine. No fracture or other osseous abnormality is  seen. No chest wall pathology is seen.      Abdomen and pelvis: The liver, spleen, pancreas, gallbladder, and  adrenal glands are normal. There are two cysts of the right kidney  measuring 2.2 and 2.0 cm in greatest dimension, respectively. No other  renal abnormality is seen. The bladder is unremarkable. No enlarged  lymph node or other abnormal mass is demonstrated. No free fluid is  seen. No free intraperitoneal gas is identified. The gastrointestinal  tract is unremarkable. The appendix is not identified. There is no  additional evidence of appendicitis. There is calcification in the  vascular structures. There are degenerative changes in the spine. No  fracture or other osseous abnormality is seen. No abdominal or pelvic  wall pathology is demonstrated.                                                                       IMPRESSION: There is a small left pneumothorax. No definite rib  fracture is seen.          Impression and Plan:   Impression:   78-year-old man with intermittent microscopic hematuria and mild LUTS      Plan:   Microscopic hematuria  - I reviewed his labs with urinalyses dating back to 2012.  We discussed that he has had trace blood on his urine dip test, however only had 1 positive microscopic analysis in 2018.  His most recent urinalysis was negative for microscopic hematuria  - Given the microscopic finding, we discussed that no additional workup is needed at this time  - He did have a prior CT scan from 2018 which we reviewed together and I reviewed these images personally.  There is no evidence for concern from a urinary tract standpoint.  He has 2 benign right renal cysts  - No further workup is needed for this diagnosis    Mild LUTS  - We discussed the pathophysiology of the bladder and the prostate and the normal changes associated with the  development of BPH and LUTS  - He has very mild symptoms and is overall not very bothered by them  - We discussed that no further treatment is needed at this time    Follow-up with urology on a as needed basis     Thank you for the kind consultation.    Time spent: 18 minutes spent on the date of the encounter doing chart review, history and exam, documentation and further activities as noted above.    Gabo Lorenzo MD   Urology  [facility]  [facility] Phone: [phone]  [facility] Phone: [phone]

## 2024-01-11 NOTE — NURSING NOTE
Jaron Carrera's goals for this visit include:   Chief Complaint   Patient presents with    New Patient     Gross hematuria       He requests these members of his care team be copied on today's visit information:       PCP: Cirilo Spain    Referring Provider:  Noy Richard NP  [id number] [place] [address]    There were no vitals taken for this visit.    Do you need any medication refills at today's visit?     Cheryl Maki LPN on 1/11/2024 at 9:06 AM

## 2024-03-06 ENCOUNTER — LAB (OUTPATIENT)
Dept: LAB | Facility: CLINIC | Age: 79
End: 2024-03-06
Payer: COMMERCIAL

## 2024-03-06 DIAGNOSIS — D47.2 MGUS (MONOCLONAL GAMMOPATHY OF UNKNOWN SIGNIFICANCE): ICD-10-CM

## 2024-03-07 PROCEDURE — 84166 PROTEIN E-PHORESIS/URINE/CSF: CPT | Performed by: PATHOLOGY

## 2024-03-07 PROCEDURE — 81050 URINALYSIS VOLUME MEASURE: CPT | Performed by: PATHOLOGY

## 2024-03-07 PROCEDURE — 86335 IMMUNFIX E-PHORSIS/URINE/CSF: CPT | Performed by: PATHOLOGY

## 2024-03-08 ENCOUNTER — LAB (OUTPATIENT)
Dept: LAB | Facility: CLINIC | Age: 79
End: 2024-03-08
Payer: COMMERCIAL

## 2024-03-08 DIAGNOSIS — E78.1 PURE HYPERGLYCERIDEMIA: Primary | ICD-10-CM

## 2024-03-08 DIAGNOSIS — D47.2 MGUS (MONOCLONAL GAMMOPATHY OF UNKNOWN SIGNIFICANCE): ICD-10-CM

## 2024-03-08 LAB
ALBUMIN SERPL BCG-MCNC: 4.5 G/DL (ref 3.5–5.2)
ALP SERPL-CCNC: 86 U/L (ref 40–150)
ALT SERPL W P-5'-P-CCNC: 27 U/L (ref 0–70)
ANION GAP SERPL CALCULATED.3IONS-SCNC: 9 MMOL/L (ref 7–15)
AST SERPL W P-5'-P-CCNC: 29 U/L (ref 0–45)
BASOPHILS # BLD AUTO: 0 10E3/UL (ref 0–0.2)
BASOPHILS NFR BLD AUTO: 0 %
BILIRUB SERPL-MCNC: 0.7 MG/DL
BUN SERPL-MCNC: 19.8 MG/DL (ref 8–23)
CALCIUM SERPL-MCNC: 9.4 MG/DL (ref 8.8–10.2)
CHLORIDE SERPL-SCNC: 102 MMOL/L (ref 98–107)
CREAT SERPL-MCNC: 1.2 MG/DL (ref 0.67–1.17)
DEPRECATED HCO3 PLAS-SCNC: 26 MMOL/L (ref 22–29)
EGFRCR SERPLBLD CKD-EPI 2021: 62 ML/MIN/1.73M2
EOSINOPHIL # BLD AUTO: 0.1 10E3/UL (ref 0–0.7)
EOSINOPHIL NFR BLD AUTO: 1 %
ERYTHROCYTE [DISTWIDTH] IN BLOOD BY AUTOMATED COUNT: 12.1 % (ref 10–15)
GLUCOSE SERPL-MCNC: 100 MG/DL (ref 70–99)
HCT VFR BLD AUTO: 47.2 % (ref 40–53)
HGB BLD-MCNC: 16.1 G/DL (ref 13.3–17.7)
IMM GRANULOCYTES # BLD: 0 10E3/UL
IMM GRANULOCYTES NFR BLD: 0 %
LYMPHOCYTES # BLD AUTO: 1.3 10E3/UL (ref 0.8–5.3)
LYMPHOCYTES NFR BLD AUTO: 19 %
MCH RBC QN AUTO: 33.3 PG (ref 26.5–33)
MCHC RBC AUTO-ENTMCNC: 34.1 G/DL (ref 31.5–36.5)
MCV RBC AUTO: 98 FL (ref 78–100)
MONOCYTES # BLD AUTO: 0.5 10E3/UL (ref 0–1.3)
MONOCYTES NFR BLD AUTO: 7 %
NEUTROPHILS # BLD AUTO: 5.1 10E3/UL (ref 1.6–8.3)
NEUTROPHILS NFR BLD AUTO: 73 %
PLATELET # BLD AUTO: 190 10E3/UL (ref 150–450)
POTASSIUM SERPL-SCNC: 3.9 MMOL/L (ref 3.4–5.3)
PROT SERPL-MCNC: 7.8 G/DL (ref 6.4–8.3)
RBC # BLD AUTO: 4.84 10E6/UL (ref 4.4–5.9)
SODIUM SERPL-SCNC: 137 MMOL/L (ref 135–145)
TOTAL PROTEIN SERUM FOR ELP: 7.8 G/DL (ref 6.4–8.3)
WBC # BLD AUTO: 7 10E3/UL (ref 4–11)

## 2024-03-08 PROCEDURE — 86334 IMMUNOFIX E-PHORESIS SERUM: CPT | Performed by: PATHOLOGY

## 2024-03-08 PROCEDURE — 80053 COMPREHEN METABOLIC PANEL: CPT

## 2024-03-08 PROCEDURE — 85025 COMPLETE CBC W/AUTO DIFF WBC: CPT

## 2024-03-08 PROCEDURE — 83521 IG LIGHT CHAINS FREE EACH: CPT

## 2024-03-08 PROCEDURE — 84165 PROTEIN E-PHORESIS SERUM: CPT | Performed by: PATHOLOGY

## 2024-03-08 PROCEDURE — 82784 ASSAY IGA/IGD/IGG/IGM EACH: CPT

## 2024-03-08 PROCEDURE — 84155 ASSAY OF PROTEIN SERUM: CPT | Mod: 59

## 2024-03-08 PROCEDURE — 36415 COLL VENOUS BLD VENIPUNCTURE: CPT

## 2024-03-11 LAB
ALBUMIN MFR UR ELPH: 17 %
ALBUMIN SERPL ELPH-MCNC: 4.5 G/DL (ref 3.7–5.1)
ALPHA1 GLOB MFR UR ELPH: 7.1 %
ALPHA1 GLOB SERPL ELPH-MCNC: 0.2 G/DL (ref 0.2–0.4)
ALPHA2 GLOB MFR UR ELPH: 8 %
ALPHA2 GLOB SERPL ELPH-MCNC: 0.9 G/DL (ref 0.5–0.9)
B-GLOBULIN MFR UR ELPH: 19.5 %
B-GLOBULIN SERPL ELPH-MCNC: 0.6 G/DL (ref 0.6–1)
GAMMA GLOB MFR UR ELPH: 48.4 %
GAMMA GLOB SERPL ELPH-MCNC: 1.6 G/DL (ref 0.7–1.6)
IGA SERPL-MCNC: 85 MG/DL (ref 84–499)
IGG SERPL-MCNC: 1750 MG/DL (ref 610–1616)
IGM SERPL-MCNC: 43 MG/DL (ref 35–242)
KAPPA LC FREE SER-MCNC: 11.06 MG/DL (ref 0.33–1.94)
KAPPA LC FREE/LAMBDA FREE SER NEPH: 14.55 {RATIO} (ref 0.26–1.65)
LAMBDA LC FREE SERPL-MCNC: 0.76 MG/DL (ref 0.57–2.63)
M PROTEIN MFR UR ELPH: 16.2 %
M PROTEIN SERPL ELPH-MCNC: 1.1 G/DL
PROT ELPH PNL UR ELPH: NORMAL
PROT PATTERN SERPL ELPH-IMP: ABNORMAL
PROT PATTERN SERPL IFE-IMP: NORMAL
PROT PATTERN UR ELPH-IMP: ABNORMAL

## 2024-03-12 ENCOUNTER — VIRTUAL VISIT (OUTPATIENT)
Dept: ONCOLOGY | Facility: CLINIC | Age: 79
End: 2024-03-12
Attending: INTERNAL MEDICINE
Payer: COMMERCIAL

## 2024-03-12 VITALS — BODY MASS INDEX: 25.2 KG/M2 | WEIGHT: 180 LBS | HEIGHT: 71 IN

## 2024-03-12 DIAGNOSIS — D47.2 MGUS (MONOCLONAL GAMMOPATHY OF UNKNOWN SIGNIFICANCE): Primary | ICD-10-CM

## 2024-03-12 PROCEDURE — 99213 OFFICE O/P EST LOW 20 MIN: CPT | Mod: 95 | Performed by: INTERNAL MEDICINE

## 2024-03-12 ASSESSMENT — PAIN SCALES - GENERAL: PAINLEVEL: NO PAIN (0)

## 2024-03-12 NOTE — PROGRESS NOTES
FOLLOW-UP VISIT NOTE    PATIENT NAME: Jaron Carrera MRN # [id number]  DATE OF VISIT: Mar 12, 2024 YOB: 1945    REFERRING PROVIDER: Cirilo Spain PA-C  [id number] [place] [address]    Reason for follow up   Monoclonal gammopathy    HISTORY:  He was being followed by Dr. Leary but now he has transferred his care to me.  I have reviewed his previous records and have copied and updated from prior notes.    79 year old  male with no significant past medical history who in April 2018 presented to primary care provider with complaints of fatigue. He was noted to have mild anemia was further workup was ordered including protein electrophoresis which revealed monoclonal paraprotein along with elevated ferritin and CRP. Subsequently serum immunofixation and urine immunofixation were requested which revealed 1.3 g/ dl IgG kappa paraprotein with IgG 1880 mg/dl.    05/21/18 negative for evidence of end organ damage( CRAB criteria)  bone marrow biopsy showing 2-3% plasma cells. 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    SUBJECTIVE     This is a video visit.  He is doing good.  He denies any night sweats or weight loss on annual.  No new swellings.  Energy is fine.  No neuropathy.  No fevers infections or shortness of breath.      ROS:  Rest of the comprehensive review of the system was essentially unremarkable.    I reviewed other hx in Epic as below      PAST MEDICAL HISTORY     Past Medical History:   Diagnosis Date    Vitamin B12 deficiency     Vitamin D deficiencies          CURRENT OUTPATIENT MEDICATIONS     Current Outpatient Medications   Medication Sig Dispense Refill    Ferrous Sulfate (IRON SUPPLEMENT PO) Take 45 mg by mouth twice a week       VITAMIN B-12 PO Take one tablet every other day      VITAMIN D PO Take one tablet by mouth daily       FAMILY HX:  Family History   Problem Relation  Age of Onset    Respiratory Father         COPD    Lipids Brother     Prostate Cancer Brother     Other Cancer Brother     Thyroid Disease Daughter     Hypertension Brother     Prostate Cancer Brother     C.A.D. No family hx of     Cancer - colorectal No family hx of     Cerebrovascular Disease No family hx of     Diabetes No family hx of      1 brother had multiple myeloma- he had exposure to agent orange.  Another brother also has MGUS and he also is on observation.  3 daughters are healthy       ALLERGIES     Allergies   Allergen Reactions    Pcn [Penicillins] Hives     Childhood     SOCIAL HISTORY:  Social History     Socioeconomic History    Marital status:      Spouse name: Alyssa    Number of children: 3    Years of education: Not on file    Highest education level: Not on file   Occupational History    Occupation: certified      Employer: RETIRED   Tobacco Use    Smoking status: Former     Packs/day: 1.00     Years: 2.00     Additional pack years: 0.00     Total pack years: 2.00     Types: Cigarettes     Quit date: 1970     Years since quittin.6    Smokeless tobacco: Never   Vaping Use    Vaping Use: Never used   Substance and Sexual Activity    Alcohol use: Yes     Alcohol/week: 1.7 standard drinks of alcohol     Types: 2 Cans of beer per week     Comment: 1-2 beers weekly    Drug use: No    Sexual activity: Not Currently     Partners: Female     Birth control/protection: None   Other Topics Concern    Parent/sibling w/ CABG, MI or angioplasty before 65F 55M? No   Social History Narrative    Not on file     Social Determinants of Health     Financial Resource Strain: Low Risk  (2023)    Financial Resource Strain     Within the past 12 months, have you or your family members you live with been unable to get utilities (heat, electricity) when it was really needed?: No   Food Insecurity: Low Risk  (2023)    Food Insecurity     Within the past 12 months, did you worry  "that your food would run out before you got money to buy more?: No     Within the past 12 months, did the food you bought just not last and you didn t have money to get more?: No   Transportation Needs: Low Risk  (11/26/2023)    Transportation Needs     Within the past 12 months, has lack of transportation kept you from medical appointments, getting your medicines, non-medical meetings or appointments, work, or from getting things that you need?: No   Physical Activity: Not on file   Stress: Not on file   Social Connections: Not on file   Interpersonal Safety: Low Risk  (11/27/2023)    Interpersonal Safety     Do you feel physically and emotionally safe where you currently live?: Yes     Within the past 12 months, have you been hit, slapped, kicked or otherwise physically hurt by someone?: No     Within the past 12 months, have you been humiliated or emotionally abused in other ways by your partner or ex-partner?: No   Housing Stability: Low Risk  (11/26/2023)    Housing Stability     Do you have housing? : Yes     Are you worried about losing your housing?: No   No smoking. Drinks etoh occasionally.      PHYSICAL EXAM     Ht 1.803 m (5' 11\")   Wt 81.6 kg (180 lb)   BMI 25.10 kg/m    Wt Readings from Last 4 Encounters:   03/12/24 81.6 kg (180 lb)   11/27/23 83.1 kg (183 lb 3.2 oz)   11/09/23 83.6 kg (184 lb 3.2 oz)   09/12/23 80.7 kg (178 lb)       Constitutional.  Looks well and in no apparent distress.   Eyes.  Without eye redness or apparent jaundice.   Respiratory.  Non labored breathing. Speaking in full sentences.    Skin.  No concerning skin rashes on the skin visualized.   Neurological.  Is alert and oriented.  Psychiatric.  Mood and affect seem appropriate.      The rest of a comprehensive physical examination is deferred due to Public Health Emergency video visit restrictions.         LABORATORY AND IMAGING STUDIES     Reviewed   3/8/2024    CBC unremarkable.  CMP unremarkable except creatinine " 1.2.  SPEP showed M spike 1.1.  KATARZYNA shows monoclonal IgG kappa.  Urine M spike 16.2 ( better ).  KATARZYNA shows monoclonal free kappa light chain.  Serum IgG 1750.  IgA 85.  IgM 43  Kappa 11.06.  Lambda 0.76.  Ratio 14.55.       ASSESSMENT AND PLAN     79 year old  male with workup of fatigue and mild anemia of inflammation in April 2018  was noted to have a monoclonal IgG Kappa paraprotein.     IgG kappa MGUS.  No evidence of myeloma.    He has remained on observation and clinically doing well.  There was increase in the urine monoclonal protein/M spike in August 2023 so we repeated the test at 6 months interval and now to 16.8 which is better.  Otherwise labs are stable with only very slow rise in serum kappa free light chains.     Otherwise he continues to do well and MGUS is stable.  At this time we will monitor MGUS labs including 24-hour urine protein electrophoresis/KATARZYNA in 1 year.    I will see him after that     I answered all of his questions to his satisfaction.  He is agreeable and comfortable with the plan.      Ana Rosa Medel MD

## 2024-03-12 NOTE — NURSING NOTE
Is the patient currently in the state of [place]? YES    Visit mode:VIDEO    If the visit is dropped, the patient can be reconnected by: VIDEO VISIT: Send to e-mail at: [email or web address]    Will anyone else be joining the visit? NO  (If patient encounters technical issues they should call [phone]    How would you like to obtain your AVS? MyChart    Are changes needed to the allergy or medication list? Pt stated no changes to allergies and Pt stated no med changes    Reason for visit: AUNG Carrasco LPN

## 2024-03-12 NOTE — PROGRESS NOTES
Virtual Visit Details    Type of service:  Video Visit   Video Start Time: 10:19 AM  Video End Time:10:27 AM    Originating Location (pt. Location): Home    Distant Location (provider location):  Off-site  Platform used for Video Visit: Well

## 2024-03-12 NOTE — LETTER
3/12/2024         RE: Jaron Carrera  [address] [place] [address]        Dear Colleague,    Thank you for referring your patient, Jaron Carrera, to the [facility]. Please see a copy of my visit note below.    Virtual Visit Details    Type of service:  Video Visit   Video Start Time: 10:19 AM  Video End Time:10:27 AM    Originating Location (pt. Location): Home    Distant Location (provider location):  Off-site  Platform used for Video Visit: [facility]    FOLLOW-UP VISIT NOTE    PATIENT NAME: Jaron Carrera MRN # [id number]  DATE OF VISIT: Mar 12, 2024 YOB: 1945    REFERRING PROVIDER: Cirilo Spain PA-C  [id number] [place] [address]    Reason for follow up   Monoclonal gammopathy    HISTORY:  He was being followed by Dr. Leary but now he has transferred his care to me.  I have reviewed his previous records and have copied and updated from prior notes.    79 year old  male with no significant past medical history who in April 2018 presented to primary care provider with complaints of fatigue. He was noted to have mild anemia was further workup was ordered including protein electrophoresis which revealed monoclonal paraprotein along with elevated ferritin and CRP. Subsequently serum immunofixation and urine immunofixation were requested which revealed 1.3 g/ dl IgG kappa paraprotein with IgG 1880 mg/dl.    05/21/18 negative for evidence of end organ damage( CRAB criteria)  bone marrow biopsy showing 2-3% plasma cells. 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    SUBJECTIVE     This is a video visit.  He is doing good.  He denies any night sweats or weight loss on annual.  No new swellings.  Energy is fine.  No neuropathy.  No fevers infections or shortness of breath.      ROS:  Rest of the comprehensive review of the system was essentially  unremarkable.    I reviewed other hx in Epic as below      PAST MEDICAL HISTORY     Past Medical History:   Diagnosis Date     Vitamin B12 deficiency      Vitamin D deficiencies          CURRENT OUTPATIENT MEDICATIONS     Current Outpatient Medications   Medication Sig Dispense Refill     Ferrous Sulfate (IRON SUPPLEMENT PO) Take 45 mg by mouth twice a week        VITAMIN B-12 PO Take one tablet every other day       VITAMIN D PO Take one tablet by mouth daily       FAMILY HX:  Family History   Problem Relation Age of Onset     Respiratory Father         COPD     Lipids Brother      Prostate Cancer Brother      Other Cancer Brother      Thyroid Disease Daughter      Hypertension Brother      Prostate Cancer Brother      C.A.D. No family hx of      Cancer - colorectal No family hx of      Cerebrovascular Disease No family hx of      Diabetes No family hx of      1 brother had multiple myeloma- he had exposure to agent orange.  Another brother also has MGUS and he also is on observation.  3 daughters are healthy       ALLERGIES     Allergies   Allergen Reactions     Pcn [Penicillins] Hives     Childhood     SOCIAL HISTORY:  Social History     Socioeconomic History     Marital status:      Spouse name: Alyssa     Number of children: 3     Years of education: Not on file     Highest education level: Not on file   Occupational History     Occupation: certified      Employer: RETIRED   Tobacco Use     Smoking status: Former     Packs/day: 1.00     Years: 2.00     Additional pack years: 0.00     Total pack years: 2.00     Types: Cigarettes     Quit date: 1970     Years since quittin.6     Smokeless tobacco: Never   Vaping Use     Vaping Use: Never used   Substance and Sexual Activity     Alcohol use: Yes     Alcohol/week: 1.7 standard drinks of alcohol     Types: 2 Cans of beer per week     Comment: 1-2 beers weekly     Drug use: No     Sexual activity: Not Currently     Partners: Female      "Birth control/protection: None   Other Topics Concern     Parent/sibling w/ CABG, MI or angioplasty before 65F 55M? No   Social History Narrative     Not on file     Social Determinants of Health     Financial Resource Strain: Low Risk  (11/26/2023)    Financial Resource Strain      Within the past 12 months, have you or your family members you live with been unable to get utilities (heat, electricity) when it was really needed?: No   Food Insecurity: Low Risk  (11/26/2023)    Food Insecurity      Within the past 12 months, did you worry that your food would run out before you got money to buy more?: No      Within the past 12 months, did the food you bought just not last and you didn t have money to get more?: No   Transportation Needs: Low Risk  (11/26/2023)    Transportation Needs      Within the past 12 months, has lack of transportation kept you from medical appointments, getting your medicines, non-medical meetings or appointments, work, or from getting things that you need?: No   Physical Activity: Not on file   Stress: Not on file   Social Connections: Not on file   Interpersonal Safety: Low Risk  (11/27/2023)    Interpersonal Safety      Do you feel physically and emotionally safe where you currently live?: Yes      Within the past 12 months, have you been hit, slapped, kicked or otherwise physically hurt by someone?: No      Within the past 12 months, have you been humiliated or emotionally abused in other ways by your partner or ex-partner?: No   Housing Stability: Low Risk  (11/26/2023)    Housing Stability      Do you have housing? : Yes      Are you worried about losing your housing?: No   No smoking. Drinks etoh occasionally.      PHYSICAL EXAM     Ht 1.803 m (5' 11\")   Wt 81.6 kg (180 lb)   BMI 25.10 kg/m    Wt Readings from Last 4 Encounters:   03/12/24 81.6 kg (180 lb)   11/27/23 83.1 kg (183 lb 3.2 oz)   11/09/23 83.6 kg (184 lb 3.2 oz)   09/12/23 80.7 kg (178 lb)       Constitutional.  Looks " well and in no apparent distress.   Eyes.  Without eye redness or apparent jaundice.   Respiratory.  Non labored breathing. Speaking in full sentences.    Skin.  No concerning skin rashes on the skin visualized.   Neurological.  Is alert and oriented.  Psychiatric.  Mood and affect seem appropriate.      The rest of a comprehensive physical examination is deferred due to [facility] video visit restrictions.         LABORATORY AND IMAGING STUDIES     Reviewed   3/8/2024    CBC unremarkable.  CMP unremarkable except creatinine 1.2.  SPEP showed M spike 1.1.  KATARZYNA shows monoclonal IgG kappa.  Urine M spike 16.2 ( better ).  KATARZYNA shows monoclonal free kappa light chain.  Serum IgG 1750.  IgA 85.  IgM 43  Kappa 11.06.  Lambda 0.76.  Ratio 14.55.       ASSESSMENT AND PLAN     79 year old  male with workup of fatigue and mild anemia of inflammation in April 2018  was noted to have a monoclonal IgG Kappa paraprotein.     IgG kappa MGUS.  No evidence of myeloma.    He has remained on observation and clinically doing well.  There was increase in the urine monoclonal protein/M spike in August 2023 so we repeated the test at 6 months interval and now to 16.8 which is better.  Otherwise labs are stable with only very slow rise in serum kappa free light chains.     Otherwise he continues to do well and MGUS is stable.  At this time we will monitor MGUS labs including 24-hour urine protein electrophoresis/KATARZYNA in 1 year.    I will see him after that     I answered all of his questions to his satisfaction.  He is agreeable and comfortable with the plan.      Ana Rosa Medel MD         Again, thank you for allowing me to participate in the care of your patient.        Sincerely,        Ana Rosa Medel MD

## 2024-04-30 ENCOUNTER — ANCILLARY PROCEDURE (OUTPATIENT)
Dept: GENERAL RADIOLOGY | Facility: CLINIC | Age: 79
End: 2024-04-30
Attending: PEDIATRICS
Payer: COMMERCIAL

## 2024-04-30 ENCOUNTER — OFFICE VISIT (OUTPATIENT)
Dept: ORTHOPEDICS | Facility: CLINIC | Age: 79
End: 2024-04-30
Payer: COMMERCIAL

## 2024-04-30 VITALS — HEIGHT: 71 IN | WEIGHT: 180 LBS | BODY MASS INDEX: 25.2 KG/M2

## 2024-04-30 DIAGNOSIS — S49.91XA INJURY OF RIGHT SHOULDER, INITIAL ENCOUNTER: ICD-10-CM

## 2024-04-30 DIAGNOSIS — S49.91XA INJURY OF RIGHT SHOULDER, INITIAL ENCOUNTER: Primary | ICD-10-CM

## 2024-04-30 PROCEDURE — 73030 X-RAY EXAM OF SHOULDER: CPT | Mod: TC | Performed by: RADIOLOGY

## 2024-04-30 PROCEDURE — 99204 OFFICE O/P NEW MOD 45 MIN: CPT | Performed by: PEDIATRICS

## 2024-04-30 NOTE — PROGRESS NOTES
ASSESSMENT & PLAN    Rose was seen today for injury.    Diagnoses and all orders for this visit:    Injury of right shoulder, initial encounter  -     XR Shoulder Right G/E 3 Views; Future  -     MR Shoulder Right w/o Contrast; Future      Undiagnosed new problem, unclear prognosis.  Indicated he would consider surgery if an option, so plan MRI next.  See below.  Questions answered. Discussed signs and symptoms that may indicate more serious issues; the patient was instructed to seek appropriate care if noted. Rose indicates understanding of these issues and agrees with the plan.      See Patient Instructions  Patient Instructions   Concern for rotator cuff injury in the right shoulder, given nature of the pain, and exam findings.  X-rays of the right shoulder today appear to demonstrate some elevation of the humeral head at the shoulder, which can also be an indicator of rotator cuff pathology.  We discussed considerations around rehab approach, additional imaging with MRI, also possible future injection.  Simple home exercises reviewed today, to work on motion to begin.  Also placed order for MRI to evaluate shoulder further.    Advanced imaging is done by appointment. Please call [facility] ([facility]/[place]) [phone] , [facility] ([facility]/[place]) [phone] , or [facility] ([facility]) [phone]  to schedule your MRI.     Some insurance companies may require a prior authorization to be completed which can delay the time until you are able to schedule your appointment.       If you are active on DistalMotion, you may have access to your test results before your provider is able to review the study and advise on next steps.      The clinic will plan to contact you with results either via phone or PersistIQt. If you have not heard from the clinic within 2-3 days following your MRI, please contact us at [phone] or via  "Russ.  Alternatively, if interested in further discussion with me about MRI findings and next steps, feel free to schedule a telephone visit, video visit, or recheck appointment in clinic.        If you have any further questions for your physician or physician s care team you can contact them thru Conradt or by calling [phone].      Danny Ramirez [facility]    -----  Chief Complaint   Patient presents with    Right Shoulder - Injury       SUBJECTIVE  Jaron Carrera is a/an 79 year old male who is seen as a self referral for evaluation of right shoulder.     The patient is seen by themselves.  The patient is Right handed    Onset: 6 day(s) ago. Patient describes injury as catching groceries from falling. Notes that he is improving slightly  Location of Pain: right shoulder, lateral shoulder and into upper arm  Worsened by: abduction, lifting over head  Better with:  Treatments tried: rest/activity avoidance, ibuprofen, various topicals, hot baths, HEP from previous shoulder surgery  Associated symptoms: Pulling    Orthopedic/Surgical history: Left shoulder scope noted from patient 2018 with Dr. Carl  Social History/Occupation: Retired    **  Above information per rooming staff.  Additional history:  Reached out to grab groceries from falling, noted popping posterior shoulder area, and pain.  Recalls having some right shoulder pain in past, e.g., with golf.      REVIEW OF SYSTEMS:  Review of Systems    OBJECTIVE:  Ht 1.803 m (5' 11\")   Wt 81.6 kg (180 lb)   BMI 25.10 kg/m         Right Shoulder exam    ROM:      forward flexion ~100-110, with pain        abduction ~110-120, pain, some shoulder hiking       internal rotation lacking 2-3 vertebral segmetns compared to left, tightness, no change in pain       external rotation lacking 5-10 deg compared to left, no change in pain    Tender: lateral shoulder    Strength:      abduction 4/5       internal " rotation 5/5       external rotation 2/5  Pain with abduction, more with ER    Impingement testing:       Colon        empty can  Pain with testing        RADIOLOGY:  Final results and radiologist's interpretation, available in the [facility] health record.  Images were reviewed with the patient in the office today.  My personal interpretation of the performed imaging: no acute bony abnormality noted. Question mild elevation of humeral head at glenoid, narrowed acromiohumeral space as can be seen with rotator cuff pathology.      Recent Results (from the past 24 hour(s))   XR Shoulder Right G/E 3 Views    Narrative    XR SHOULDER RIGHT G/E 3 VIEWS  4/30/2024 10:05 AM     HISTORY: Injury of right shoulder, initial encounter; pain  COMPARISON: None      Impression    IMPRESSION: No acute fracture or malalignment. Mild glenohumeral and  acromioclavicular joint degenerative changes. Osteopenia. There is an  8 mm bony excrescence at the distal humeral diaphysis, may represent  an osteochondroma.    TUAN LEAHY MD         SYSTEM ID:  [id number]             Review of prior external note(s) from - ortho surgery related to left shoulder  Review of the result(s) of each unique test - imaging  Ordering of each unique test

## 2024-04-30 NOTE — LETTER
4/30/2024         RE: Jaron Carrera  [address] [place] [address]        Dear Colleague,    Thank you for referring your patient, Jaron Carrera, to the [facility]. Please see a copy of my visit note below.    ASSESSMENT & PLAN    Rose was seen today for injury.    Diagnoses and all orders for this visit:    Injury of right shoulder, initial encounter  -     XR Shoulder Right G/E 3 Views; Future  -     MR Shoulder Right w/o Contrast; Future      Undiagnosed new problem, unclear prognosis.  Indicated he would consider surgery if an option, so plan MRI next.  See below.  Questions answered. Discussed signs and symptoms that may indicate more serious issues; the patient was instructed to seek appropriate care if noted. Rose indicates understanding of these issues and agrees with the plan.      See Patient Instructions  Patient Instructions   Concern for rotator cuff injury in the right shoulder, given nature of the pain, and exam findings.  X-rays of the right shoulder today appear to demonstrate some elevation of the humeral head at the shoulder, which can also be an indicator of rotator cuff pathology.  We discussed considerations around rehab approach, additional imaging with MRI, also possible future injection.  Simple home exercises reviewed today, to work on motion to begin.  Also placed order for MRI to evaluate shoulder further.    Advanced imaging is done by appointment. Please call [facility] ([facility]/[place]) [phone] , [facility] ([facility]/[place]) [phone] , or [facility] ([facility]) [phone]  to schedule your MRI.     Some insurance companies may require a prior authorization to be completed which can delay the time until you are able to schedule your appointment.       If you are active on [employer], you may have access to your test results before  "your provider is able to review the study and advise on next steps.      The clinic will plan to contact you with results either via phone or Sr.Pagot. If you have not heard from the clinic within 2-3 days following your MRI, please contact us at [phone] or via TheReadingRoom.  Alternatively, if interested in further discussion with me about MRI findings and next steps, feel free to schedule a telephone visit, video visit, or recheck appointment in clinic.        If you have any further questions for your physician or physician s care team you can contact them thru Ascentist or by calling [phone].      Danny Ramirez [facility]    -----  Chief Complaint   Patient presents with     Right Shoulder - Injury       SUBJECTIVE  Jaorn Carrera is a/an 79 year old male who is seen as a self referral for evaluation of right shoulder.     The patient is seen by themselves.  The patient is Right handed    Onset: 6 day(s) ago. Patient describes injury as catching groceries from falling. Notes that he is improving slightly  Location of Pain: right shoulder, lateral shoulder and into upper arm  Worsened by: abduction, lifting over head  Better with:  Treatments tried: rest/activity avoidance, ibuprofen, various topicals, hot baths, HEP from previous shoulder surgery  Associated symptoms: Pulling    Orthopedic/Surgical history: Left shoulder scope noted from patient 2018 with Dr. Carl  Social History/Occupation: Retired    **  Above information per rooming staff.  Additional history:  Reached out to grab groceries from falling, noted popping posterior shoulder area, and pain.  Recalls having some right shoulder pain in past, e.g., with golf.      REVIEW OF SYSTEMS:  Review of Systems    OBJECTIVE:  Ht 1.803 m (5' 11\")   Wt 81.6 kg (180 lb)   BMI 25.10 kg/m         Right Shoulder exam    ROM:      forward flexion ~100-110, with pain        abduction ~110-120, pain, some " shoulder hiking       internal rotation lacking 2-3 vertebral segmetns compared to left, tightness, no change in pain       external rotation lacking 5-10 deg compared to left, no change in pain    Tender: lateral shoulder    Strength:      abduction 4/5       internal rotation 5/5       external rotation 2/5  Pain with abduction, more with ER    Impingement testing:       Colon        empty can  Pain with testing        RADIOLOGY:  Final results and radiologist's interpretation, available in the [facility] health record.  Images were reviewed with the patient in the office today.  My personal interpretation of the performed imaging: no acute bony abnormality noted. Question mild elevation of humeral head at glenoid, narrowed acromiohumeral space as can be seen with rotator cuff pathology.      Recent Results (from the past 24 hour(s))   XR Shoulder Right G/E 3 Views    Narrative    XR SHOULDER RIGHT G/E 3 VIEWS  4/30/2024 10:05 AM     HISTORY: Injury of right shoulder, initial encounter; pain  COMPARISON: None      Impression    IMPRESSION: No acute fracture or malalignment. Mild glenohumeral and  acromioclavicular joint degenerative changes. Osteopenia. There is an  8 mm bony excrescence at the distal humeral diaphysis, may represent  an osteochondroma.    TUAN LEAHY MD         SYSTEM ID:  [id number]             Review of prior external note(s) from - ortho surgery related to left shoulder  Review of the result(s) of each unique test - imaging  Ordering of each unique test             Again, thank you for allowing me to participate in the care of your patient.        Sincerely,        Danny Ramirez DO

## 2024-04-30 NOTE — PATIENT INSTRUCTIONS
Concern for rotator cuff injury in the right shoulder, given nature of the pain, and exam findings.  X-rays of the right shoulder today appear to demonstrate some elevation of the humeral head at the shoulder, which can also be an indicator of rotator cuff pathology.  We discussed considerations around rehab approach, additional imaging with MRI, also possible future injection.  Simple home exercises reviewed today, to work on motion to begin.  Also placed order for MRI to evaluate shoulder further.    Advanced imaging is done by appointment. Please call [facility] ([facility]/[place]) [phone] , [facility] ([facility]/[place]) [phone] , or [facility] ([facility]) [phone]  to schedule your MRI.     Some insurance companies may require a prior authorization to be completed which can delay the time until you are able to schedule your appointment.       If you are active on Gde[employer]on, you may have access to your test results before your provider is able to review the study and advise on next steps.      The clinic will plan to contact you with results either via phone or Teal Orbit. If you have not heard from the clinic within 2-3 days following your MRI, please contact us at [phone] or via GdeSlon.  Alternatively, if interested in further discussion with me about MRI findings and next steps, feel free to schedule a telephone visit, video visit, or recheck appointment in clinic.        If you have any further questions for your physician or physician s care team you can contact them thru GdeSlon or by calling [phone].

## 2024-05-13 ENCOUNTER — ANCILLARY PROCEDURE (OUTPATIENT)
Dept: MRI IMAGING | Facility: CLINIC | Age: 79
End: 2024-05-13
Attending: PEDIATRICS
Payer: COMMERCIAL

## 2024-05-13 DIAGNOSIS — S49.91XA INJURY OF RIGHT SHOULDER, INITIAL ENCOUNTER: ICD-10-CM

## 2024-05-13 PROCEDURE — 73221 MRI JOINT UPR EXTREM W/O DYE: CPT | Mod: RT | Performed by: RADIOLOGY

## 2024-05-21 NOTE — PROGRESS NOTES
"CHIEF COMPLAINT:   Chief Complaint   Patient presents with    Left Shoulder - Injury     Injury was 1 month ago + where patient went to grab a falling grocery bag and felt a pop. Reaching abduction and flexion are very limited, noting due to inability, not really pain. Notes that pain does increase with motion as well. Pain is located mostly around the upper arm, diffuse anterior and lateral shoulder. No interim injury since last office visit with Dr. Ramirez.         Jaron Carrera is seen today in the [facility] for evaluation of right  shoulder injury at the request of Dr. Danny Ramirez       HISTORY:  Jaron Carrera is a 79 year old male, right  -hand dominant, who is seen in consultation at the request of Dr. Ramirez for right  shoulder injury that occurred 1+ months ago. He has been golfing and has noticed some pain, problems with the right shoulder. Didn't think much of it. However, about a month ago was setting groceries on a table and started to fall, so he quickly grabbed for them and felt a \"pop\" in the shoulder.    Locates pain to the lateral shoulder and upper arm.    Ok at rest, has pain at night causing difficulties sleeping.    Treatment with home exercise program, Aleve. Has tried topicals without relief.      He is status post left rotator cuff repair 2019. Has done well with that.      Aggrevated by: reaching, lifting, night  Relieved by: restMallika  Present symptoms: pain with ADL's (dressing),  pain with overhead activities,  pain reaching behind back,  pain reaching out or away from body (flexion/ abduction),  positional night pain,  pain lifting,  weakness with lifting,  Pain location: lateral shoulder, deltoid and upper arm, and located throughout the shoulder joint/diffuse  Pain severity: 1/10  Pain quality: dull, aching, and sharp  Frequency of symptoms: occasionally    Other PMH:  has a past medical history of Vitamin B12 deficiency and " Vitamin D deficiencies.    He has no past medical history of Arthritis, Cancer (H), Cerebral infarction (H), Congestive heart failure (H), COPD (chronic obstructive pulmonary disease) (H), Depressive disorder, Diabetes (H), Heart disease, History of blood transfusion, Hypertension, Thyroid disease, or Uncomplicated asthma.  Patient Active Problem List   Diagnosis    Vitamin D deficiency    Vitamin B12 deficiency    CARDIOVASCULAR SCREENING; LDL GOAL LESS THAN 160    Advanced directives, counseling/discussion    Pure hyperglyceridemia    Screening for prostate cancer    MGUS (monoclonal gammopathy of unknown significance)    Pneumothorax, closed, traumatic, initial encounter       Surgical Hx:  has a past surgical history that includes surgical history of -  (01/01/1995); surgical history of -  (01/01/1965); ESOPHAGOSCOPY, DIAGNOSTIC; Colonoscopy (03/21/2013); biopsy (2018?); and orthopedic surgery (2019?).    Medications:   Current Outpatient Medications:     Ferrous Sulfate (IRON SUPPLEMENT PO), Take 45 mg by mouth twice a week , Disp: , Rfl:     VITAMIN B-12 PO, Take one tablet every other day, Disp: , Rfl:     VITAMIN D PO, Take one tablet by mouth daily, Disp: , Rfl:     Allergies:   Allergies   Allergen Reactions    Pcn [Penicillins] Hives     Childhood       Social Hx: retired.   reports that he quit smoking about 53 years ago. His smoking use included cigarettes. He started smoking about 55 years ago. He has a 2 pack-year smoking history. He has never used smokeless tobacco. He reports current alcohol use of about 1.7 standard drinks of alcohol per week. He reports that he does not use drugs.    Family Hx: family history includes Hypertension in his brother; Lipids in his brother; Other Cancer in his brother; Prostate Cancer in his brother and brother; Respiratory in his father; Thyroid Disease in his daughter..    REVIEW OF SYSTEMS: 10 point ROS neg other than the symptoms noted above in the HPI and PMH.  "Notables include  CONSTITUTIONAL:NEGATIVE for fever, chills, change in weight  INTEGUMENTARY/SKIN: NEGATIVE for worrisome rashes, moles or lesions  MUSCULOSKELETAL:See HPI above  NEURO: NEGATIVE for weakness, dizziness or paresthesias    PHYSICAL EXAM:  /75   Ht 1.822 m (5' 11.75\")   Wt 82.6 kg (182 lb)   BMI 24.86 kg/m     GENERAL APPEARANCE: healthy, alert, no distress  SKIN: no suspicious lesions or rashes  NEURO: Normal strength and tone, mentation intact and speech normal  PSYCH:  mentation appears normal and affect normal, not anxious  RESPIRATORY: No increased work of breathing.  VASCULAR: Radial pulses 2+ and brisk cappillary refill   LYMPH: no palpable axillary lymphadenopathy or cervical neck lymphadenopathy.      MUSCULOSKELETAL:    RIGHT UPPER EXTREMITY:  Sensation intact to light touch in median, radial, ulnar and axillary nerve distributions  Palpable 2+ radial pulse, brisk capillary refill to all fingers, wwp  Intact epl fpl fdp edc wrist flexion/extension biceps triceps deltoid    RIGHT SHOULDER:  Shoulder Inspection: no swelling, bruising, discoloration, or obvious deformity or asymmetry  moderate muscle atrophy diffuse    Tender: acromion, anterior capsule, proximal bicep tendon, greater tuberosity, and proximal humerus  Non-tender: SC joint  Range of Motion:   Active:forward flexion 90 degrees, external rotation  10 degrees, internal rotation  T12   Passive: forward flexion 155 degrees, external rotation  50 degrees  Strength: forward flexion 4-/5, External rotation 3+/5  Liftoff: Able  Impingement: all grade 2 positive  Special tests: Empty Can: Positive    LEFT UPPER EXTREMITY:  Sensation intact to light touch in median, radial, ulnar and axillary nerve distributions  Palpable 2+ radial pulse, brisk capillary refill to all fingers, wwp  Intact epl fpl fdp edc wrist flexion/extension biceps triceps deltoid    LEFT SHOULDER:  Shoulder Inspection: no swelling, bruising, discoloration, or " obvious deformity or asymmetry  Tender: greater tuberosity  Non-tender: AC joint  Range of Motion:   Active:forward flexion 170+ degrees, external rotation  50 degrees, internal rotation  T10  Strength: forward flexion 5/5, External rotation 5-/5  Liftoff: Able  Impingement: equivocal  Special tests: Empty Can: Negative      X-RAY INTERPRETATION: 3 views right  shoulder obtained 5/21/2024 were reviewed personally in clinic today with the  patient. On my review, No acute fracture or malalignment. Mild glenohumeral and acromioclavicular joint degenerative  changes. Osteopenia. There is an 8 mm bony excrescence at the distal humeral diaphysis, may represent an osteochondroma.      MRI right  shoulder:  5/13/2024  1. Moderate osteoarthrosis at the right shoulder acromioclavicular joint.  2. Degenerative labral tearing.  3. Full-thickness, complete tears of the right shoulder supraspinatus and infraspinatus tendons with retraction of the torn fibers to the level of the glenohumeral joint.  4. Tendinosis of the right shoulder subscapularis tendon without full-thickness tear or tendon retraction.  5. Atrophy within the supraspinatus muscle with focal atrophy within the infraspinatus muscle.   6. Mild subluxation of the biceps tendon at the bicipital groove with mild tendinosis of the intra-articular biceps tendon.    ASSESSMENT: Jaron Carrera is a 79 year old male, right  -hand dominant with acute right shoulder pain, acute on chronic massive rotator cuff tear with atrophy and retraction, gleno-humeral osteoarthritis.      PLAN:   * reviewed imaging and exam findings with patient. This is all consistent with acute on chronic, long-standing rotator cuff tear with underlying degenerative changes. There is notable atrophy of the rotator cuff muscles, which is likely more than 1 months of atrophy  * this is most likely reason for shoulder pain and decreased range of motion, weakness.   * treatment options depend on how  symptomatic as well has how aggressive patient wants to be. If not overly symptomatic, we can try a cortisone injection as well as Physical Therapy for deltoid based shoulder exercises, pain control with tylenol and NSAIDS.  * surgical options would be a cuff tear hemiarthroplasty versus reverse total shoulder arthroplasty. Conventional total shoulder arthroplasty would not work in the absence of an intact, functional rotator cuff, only doomed for failure.  Based on his age, amount of gleno-humeral osteoarthritis, severity of cuff tear and retraction, in the setting of significant atrophy of the muscles, I do not think this is repairable, and even if so, I don't think this likely would give a good outcome in the end.  This is different than his rotator cuff on the left shoulder, which was more acute and no atrophy. I wouldn't predict the same, good outcome with the right shoulder with a repair attempt.  * risks and perceived benefits of nonsurgical and surgical options of each discussed.  * the primary goal of surgery is pain relief, with return or improvement of some function secondary as a bonus. The arm will never work as normal, but hopefully we can get better function than current.  * risks of surgery include, but not limited to, bleeding, infection, pain, scar, damage to adjacent structures (such as nerves, vessels), temporary versus permanent nerve injury, failure to relieve or improve symptoms or function, recurrence of symptoms, stiffness, implant dislocation, implant failure, implant infection, need for further surgery, blood clots, risks of anesthesia and death.    * at this time, patient has no interest in surgery.    * Physical Therapy for deltoid based shoulder exercises ordered  * rest  * activity modification  * tyelnol, NSAIDS  * risks and perceived benefits of cortisone injections discussed. Patient elects NOT to proceed.   * return to clinic as needed.     Warren Carl M.D., M.S.  Dept. of  Orthopaedic Surgery  [facility]

## 2024-05-23 ENCOUNTER — OFFICE VISIT (OUTPATIENT)
Dept: ORTHOPEDICS | Facility: CLINIC | Age: 79
End: 2024-05-23
Attending: PEDIATRICS
Payer: COMMERCIAL

## 2024-05-23 VITALS
WEIGHT: 182 LBS | SYSTOLIC BLOOD PRESSURE: 136 MMHG | DIASTOLIC BLOOD PRESSURE: 75 MMHG | BODY MASS INDEX: 24.65 KG/M2 | HEIGHT: 72 IN

## 2024-05-23 DIAGNOSIS — M75.121 COMPLETE TEAR OF RIGHT ROTATOR CUFF, UNSPECIFIED WHETHER TRAUMATIC: Primary | ICD-10-CM

## 2024-05-23 PROCEDURE — 99243 OFF/OP CNSLTJ NEW/EST LOW 30: CPT | Performed by: ORTHOPAEDIC SURGERY

## 2024-05-23 ASSESSMENT — PAIN SCALES - GENERAL: PAINLEVEL: NO PAIN (1)

## 2024-05-23 NOTE — LETTER
"    5/23/2024         RE: Jaron Carrera  [address] [place] [address]        Dear Colleague,    Thank you for referring your patient, Jaron Carrera, to the [facility]. Please see a copy of my visit note below.    CHIEF COMPLAINT:   Chief Complaint   Patient presents with     Left Shoulder - Injury     Injury was 1 month ago + where patient went to grab a falling grocery bag and felt a pop. Reaching abduction and flexion are very limited, noting due to inability, not really pain. Notes that pain does increase with motion as well. Pain is located mostly around the upper arm, diffuse anterior and lateral shoulder. No interim injury since last office visit with Dr. Ramirez.         Jaron Carrera is seen today in the [facility] for evaluation of right  shoulder injury at the request of Dr. Danny Ramirez       HISTORY:  Jaron Carrera is a 79 year old male, right  -hand dominant, who is seen in consultation at the request of Dr. Ramirez for right  shoulder injury that occurred 1+ months ago. He has been golfing and has noticed some pain, problems with the right shoulder. Didn't think much of it. However, about a month ago was setting groceries on a table and started to fall, so he quickly grabbed for them and felt a \"pop\" in the shoulder.    Locates pain to the lateral shoulder and upper arm.    Ok at rest, has pain at night causing difficulties sleeping.    Treatment with home exercise program, Aleve. Has tried topicals without relief.      He is status post left rotator cuff repair 2019. Has done well with that.      Aggrevated by: reaching, lifting, night  Relieved by: rest, Aleve  Present symptoms: pain with ADL's (dressing),  pain with overhead activities,  pain reaching behind back,  pain reaching out or away from body (flexion/ abduction),  positional night pain,  pain lifting,  weakness with lifting,  Pain " location: lateral shoulder, deltoid and upper arm, and located throughout the shoulder joint/diffuse  Pain severity: 1/10  Pain quality: dull, aching, and sharp  Frequency of symptoms: occasionally    Other PMH:  has a past medical history of Vitamin B12 deficiency and Vitamin D deficiencies.    He has no past medical history of Arthritis, Cancer (H), Cerebral infarction (H), Congestive heart failure (H), COPD (chronic obstructive pulmonary disease) (H), Depressive disorder, Diabetes (H), Heart disease, History of blood transfusion, Hypertension, Thyroid disease, or Uncomplicated asthma.  Patient Active Problem List   Diagnosis     Vitamin D deficiency     Vitamin B12 deficiency     CARDIOVASCULAR SCREENING; LDL GOAL LESS THAN 160     Advanced directives, counseling/discussion     Pure hyperglyceridemia     Screening for prostate cancer     MGUS (monoclonal gammopathy of unknown significance)     Pneumothorax, closed, traumatic, initial encounter       Surgical Hx:  has a past surgical history that includes surgical history of -  (01/01/1995); surgical history of -  (01/01/1965); ESOPHAGOSCOPY, DIAGNOSTIC; Colonoscopy (03/21/2013); biopsy (2018?); and orthopedic surgery (2019?).    Medications:   Current Outpatient Medications:      Ferrous Sulfate (IRON SUPPLEMENT PO), Take 45 mg by mouth twice a week , Disp: , Rfl:      VITAMIN B-12 PO, Take one tablet every other day, Disp: , Rfl:      VITAMIN D PO, Take one tablet by mouth daily, Disp: , Rfl:     Allergies:   Allergies   Allergen Reactions     Pcn [Penicillins] Hives     Childhood       Social Hx: retired.   reports that he quit smoking about 53 years ago. His smoking use included cigarettes. He started smoking about 55 years ago. He has a 2 pack-year smoking history. He has never used smokeless tobacco. He reports current alcohol use of about 1.7 standard drinks of alcohol per week. He reports that he does not use drugs.    Family Hx: family history includes  "Hypertension in his brother; Lipids in his brother; Other Cancer in his brother; Prostate Cancer in his brother and brother; Respiratory in his father; Thyroid Disease in his daughter..    REVIEW OF SYSTEMS: 10 point ROS neg other than the symptoms noted above in the HPI and PMH. Notables include  CONSTITUTIONAL:NEGATIVE for fever, chills, change in weight  INTEGUMENTARY/SKIN: NEGATIVE for worrisome rashes, moles or lesions  MUSCULOSKELETAL:See HPI above  NEURO: NEGATIVE for weakness, dizziness or paresthesias    PHYSICAL EXAM:  /75   Ht 1.822 m (5' 11.75\")   Wt 82.6 kg (182 lb)   BMI 24.86 kg/m     GENERAL APPEARANCE: healthy, alert, no distress  SKIN: no suspicious lesions or rashes  NEURO: Normal strength and tone, mentation intact and speech normal  PSYCH:  mentation appears normal and affect normal, not anxious  RESPIRATORY: No increased work of breathing.  VASCULAR: Radial pulses 2+ and brisk cappillary refill   LYMPH: no palpable axillary lymphadenopathy or cervical neck lymphadenopathy.      MUSCULOSKELETAL:    RIGHT UPPER EXTREMITY:  Sensation intact to light touch in median, radial, ulnar and axillary nerve distributions  Palpable 2+ radial pulse, brisk capillary refill to all fingers, wwp  Intact epl fpl fdp edc wrist flexion/extension biceps triceps deltoid    RIGHT SHOULDER:  Shoulder Inspection: no swelling, bruising, discoloration, or obvious deformity or asymmetry  moderate muscle atrophy diffuse    Tender: acromion, anterior capsule, proximal bicep tendon, greater tuberosity, and proximal humerus  Non-tender: SC joint  Range of Motion:   Active:forward flexion 90 degrees, external rotation  10 degrees, internal rotation  T12   Passive: forward flexion 155 degrees, external rotation  50 degrees  Strength: forward flexion 4-/5, External rotation 3+/5  Liftoff: Able  Impingement: all grade 2 positive  Special tests: Empty Can: Positive    LEFT UPPER EXTREMITY:  Sensation intact to light touch " in median, radial, ulnar and axillary nerve distributions  Palpable 2+ radial pulse, brisk capillary refill to all fingers, wwp  Intact epl fpl fdp edc wrist flexion/extension biceps triceps deltoid    LEFT SHOULDER:  Shoulder Inspection: no swelling, bruising, discoloration, or obvious deformity or asymmetry  Tender: greater tuberosity  Non-tender: AC joint  Range of Motion:   Active:forward flexion 170+ degrees, external rotation  50 degrees, internal rotation  T10  Strength: forward flexion 5/5, External rotation 5-/5  Liftoff: Able  Impingement: equivocal  Special tests: Empty Can: Negative      X-RAY INTERPRETATION: 3 views right  shoulder obtained 5/21/2024 were reviewed personally in clinic today with the  patient. On my review, No acute fracture or malalignment. Mild glenohumeral and acromioclavicular joint degenerative  changes. Osteopenia. There is an 8 mm bony excrescence at the distal humeral diaphysis, may represent an osteochondroma.      MRI right  shoulder:  5/13/2024  1. Moderate osteoarthrosis at the right shoulder acromioclavicular joint.  2. Degenerative labral tearing.  3. Full-thickness, complete tears of the right shoulder supraspinatus and infraspinatus tendons with retraction of the torn fibers to the level of the glenohumeral joint.  4. Tendinosis of the right shoulder subscapularis tendon without full-thickness tear or tendon retraction.  5. Atrophy within the supraspinatus muscle with focal atrophy within the infraspinatus muscle.   6. Mild subluxation of the biceps tendon at the bicipital groove with mild tendinosis of the intra-articular biceps tendon.    ASSESSMENT: Jaron Carrera is a 79 year old male, right  -hand dominant with acute right shoulder pain, acute on chronic massive rotator cuff tear with atrophy and retraction, gleno-humeral osteoarthritis.      PLAN:   * reviewed imaging and exam findings with patient. This is all consistent with acute on chronic, long-standing  rotator cuff tear with underlying degenerative changes. There is notable atrophy of the rotator cuff muscles, which is likely more than 1 months of atrophy  * this is most likely reason for shoulder pain and decreased range of motion, weakness.   * treatment options depend on how symptomatic as well has how aggressive patient wants to be. If not overly symptomatic, we can try a cortisone injection as well as Physical Therapy for deltoid based shoulder exercises, pain control with tylenol and NSAIDS.  * surgical options would be a cuff tear hemiarthroplasty versus reverse total shoulder arthroplasty. Conventional total shoulder arthroplasty would not work in the absence of an intact, functional rotator cuff, only doomed for failure.  Based on his age, amount of gleno-humeral osteoarthritis, severity of cuff tear and retraction, in the setting of significant atrophy of the muscles, I do not think this is repairable, and even if so, I don't think this likely would give a good outcome in the end.  This is different than his rotator cuff on the left shoulder, which was more acute and no atrophy. I wouldn't predict the same, good outcome with the right shoulder with a repair attempt.  * risks and perceived benefits of nonsurgical and surgical options of each discussed.  * the primary goal of surgery is pain relief, with return or improvement of some function secondary as a bonus. The arm will never work as normal, but hopefully we can get better function than current.  * risks of surgery include, but not limited to, bleeding, infection, pain, scar, damage to adjacent structures (such as nerves, vessels), temporary versus permanent nerve injury, failure to relieve or improve symptoms or function, recurrence of symptoms, stiffness, implant dislocation, implant failure, implant infection, need for further surgery, blood clots, risks of anesthesia and death.    * at this time, patient has no interest in surgery.    * Physical  Therapy for deltoid based shoulder exercises ordered  * rest  * activity modification  * tyelnol, NSAIDS  * risks and perceived benefits of cortisone injections discussed. Patient elects NOT to proceed.   * return to clinic as needed.     Warren Carl M.D., M.S.  Dept. of Orthopaedic Surgery  [facility]       Again, thank you for allowing me to participate in the care of your patient.        Sincerely,        Warren Carl MD

## 2024-05-27 ASSESSMENT — ACTIVITIES OF DAILY LIVING (ADL)
PUTTING_ON_A_SHIRT_THAT_BUTTONS_DOWN_THE_FRONT: 3
REACHING_FOR_SOMETHING_ON_A_HIGH_SHELF: 6
TOUCHING_THE_BACK_OF_YOUR_NECK: 1
WASHING_YOUR_HAIR?: 8
PUTTING_ON_YOUR_PANTS: 3
CARRYING_A_HEAVY_OBJECT_OF_10_POUNDS: 7
WHEN_LYING_ON_THE_INVOLVED_SIDE: 4
AT_ITS_WORST?: 4
PLEASE_INDICATE_YOR_PRIMARY_REASON_FOR_REFERRAL_TO_THERAPY:: SHOULDER
WASHING_YOUR_BACK: 3
PLACING_AN_OBJECT_ON_A_HIGH_SHELF: 8
PUSHING_WITH_THE_INVOLVED_ARM: 5
REMOVING_SOMETHING_FROM_YOUR_BACK_POCKET: 3
PUTTING_ON_AN_UNDERSHIRT_OR_A_PULLOVER_SWEATER: 5

## 2024-05-28 ENCOUNTER — THERAPY VISIT (OUTPATIENT)
Dept: PHYSICAL THERAPY | Facility: CLINIC | Age: 79
End: 2024-05-28
Attending: ORTHOPAEDIC SURGERY
Payer: COMMERCIAL

## 2024-05-28 DIAGNOSIS — G89.29 CHRONIC RIGHT SHOULDER PAIN: Primary | ICD-10-CM

## 2024-05-28 DIAGNOSIS — M25.511 CHRONIC RIGHT SHOULDER PAIN: Primary | ICD-10-CM

## 2024-05-28 DIAGNOSIS — M75.121 COMPLETE TEAR OF RIGHT ROTATOR CUFF, UNSPECIFIED WHETHER TRAUMATIC: ICD-10-CM

## 2024-05-28 PROCEDURE — 97161 PT EVAL LOW COMPLEX 20 MIN: CPT | Mod: GP

## 2024-05-28 PROCEDURE — 97110 THERAPEUTIC EXERCISES: CPT | Mod: GP

## 2024-05-28 NOTE — PROGRESS NOTES
PHYSICAL THERAPY EVALUATION  Type of Visit: Evaluation    See electronic medical record for Abuse and Falls Screening details.    Subjective       Presenting condition or subjective complaint: Shoulder    Pt presents to PT with R shoulder pain. Pain began about a month ago when pt went to  a falling bag of groceries, heard a pop.     Pt reports he doesn have a lot of pain, moreso just a strength issue. There is pain with using the arm then.     Main complaints: difficulty using arm, difficulty reaching    Worse: sleeping, lifting arm    Better: propping arm     Has been doing pendulums and some wand exercises.      MRI 2024:  1. Moderate osteoarthrosis at the right shoulder acromioclavicular  joint.  2. Degenerative labral tearing.  3. Full-thickness, complete tears of the right shoulder supraspinatus  and infraspinatus tendons with retraction of the torn fibers to the  level of the glenohumeral joint.  4. Tendinosis of the right shoulder subscapularis tendon without  full-thickness tear or tendon retraction.  5. Atrophy within the supraspinatus muscle with focal atrophy within  the infraspinatus muscle.  5. Mild subluxation of the biceps tendon at the bicipital groove with  mild tendinosis of the intra-articular biceps tendon.        Date of onset: 24    Relevant medical history:     Dates & types of surgery:      Prior diagnostic imaging/testing results: MRI; X-ray     Prior therapy history for the same diagnosis, illness or injury: No        Living Environment  Social support: With a significant other or spouse   Type of home: House   Stairs to enter the home: Yes 3 Is there a railing: Yes   Ramp: No   Stairs inside the home: Yes 3 Is there a railing: Yes   Help at home: None  Equipment owned:       Employment: Not Applicable    Hobbies/Interests: Golf    Patient goals for therapy:      Pain assessment: Pain present  Location: R shoulder/Ratin/10 when lifting arm     Objective   SHOULDER  Problem: Goal Outcome Summary  Goal: Goal Outcome Summary  Outcome: Improving  Pt up with 1 assist, gait belt, and walker to and from bathroom. LS clear, BS present, and no flatus. CMS intact with no numbness/tingling. Drsg CDI. Hemovac intact with 60cc output. Voiding adequately. IS encouraged along with ankle pump exercises. Pain partially controlled with tylenol, vistaril, and oxycodone. MS Contin also scheduled.        EVALUATION    INTEGUMENTARY (edema, incisions):  atrophy R posterior cuff    ROM:   (Degrees) Left AROM Left PROM Right AROM  Right PROM   Shoulder Flexion   60** (70 w tape) 110**   Shoulder Extension       Shoulder Abduction   55** (65 w tape) 110**   Shoulder Adduction       Shoulder Internal Rotation   T8    Shoulder External Rotation 40  35*        STRENGTH:  abd 2-/5 MMT, ER 2+/5 MMT, IR 4/5, flexion 2+/5 MMT  SPECIAL TESTS:  (+) drop arm, (+) liftoff  PALPATION:  tenderness R posterior cuff  CERVICAL SCREEN: WNL    Assessment & Plan   CLINICAL IMPRESSIONS  Medical Diagnosis: R shoulder pain    Treatment Diagnosis: R shoulder pain   Impression/Assessment: Patient is a 79 year old male with R shoulder complaints.  The following significant findings have been identified: Pain, Decreased ROM/flexibility, Decreased joint mobility, Decreased strength, and Impaired muscle performance. These impairments interfere with their ability to perform self care tasks, recreational activities, household chores, driving , household mobility, and community mobility as compared to previous level of function.     Clinical Decision Making (Complexity):  Clinical Presentation: Stable/Uncomplicated  Clinical Presentation Rationale: based on medical and personal factors listed in PT evaluation  Clinical Decision Making (Complexity): Low complexity    PLAN OF CARE  Treatment Interventions:  Interventions: Manual Therapy, Neuromuscular Re-education, Therapeutic Activity, Therapeutic Exercise    Long Term Goals     PT Goal 1  Goal Identifier: reaching  Goal Description: pt will demo reaching above head x10 in session with no shoulder pain  Rationale: to maximize safety and independence with performance of ADLs and functional tasks;to maximize safety and independence within the home;to maximize safety and independence within the community  Target Date: 07/23/24      Frequency of Treatment: 1x/week  Duration of Treatment: 8  weeks    Recommended Referrals to Other Professionals:  Education Assessment:   Learner/Method: Patient;No Barriers to Learning  Education Comments: educated on diagnosis, prognosis, expectations of therapy, and importance of movement    Risks and benefits of evaluation/treatment have been explained.   Patient/Family/caregiver agrees with Plan of Care.     Evaluation Time:     PT Eval, Low Complexity Minutes (07217): 15     Signing Clinician: TOM CRAWFORD PT

## 2024-06-11 ENCOUNTER — THERAPY VISIT (OUTPATIENT)
Dept: PHYSICAL THERAPY | Facility: CLINIC | Age: 79
End: 2024-06-11
Payer: COMMERCIAL

## 2024-06-11 DIAGNOSIS — M25.511 CHRONIC RIGHT SHOULDER PAIN: Primary | ICD-10-CM

## 2024-06-11 DIAGNOSIS — M75.121 COMPLETE TEAR OF RIGHT ROTATOR CUFF, UNSPECIFIED WHETHER TRAUMATIC: ICD-10-CM

## 2024-06-11 DIAGNOSIS — G89.29 CHRONIC RIGHT SHOULDER PAIN: Primary | ICD-10-CM

## 2024-06-11 PROCEDURE — 97140 MANUAL THERAPY 1/> REGIONS: CPT | Mod: GP

## 2024-06-11 PROCEDURE — 97110 THERAPEUTIC EXERCISES: CPT | Mod: GP

## 2024-06-24 NOTE — PROGRESS NOTES
"CHIEF COMPLAINT:   Chief Complaint   Patient presents with    Right Shoulder - Follow Up     Right shoulder injury. 2 months out. 2 sessions of PT, continuing exercises at home. Worse when sleeping or lifting for long periods. Interested in cortisone injection - has not had cortisone before. Taking tylenol PRN.               HISTORY:  Jaron Carrera is a 79 year old male, right  -hand dominant, who is seen in followup for right  shoulder injury that occurred 2+ months ago. Returns today having gone to some therapy and doing home exercise program. He's interested in a cortisone injection today. He's not had cortisone in the past. Taking tylenol as needed for pain.     He has been golfing and has noticed some pain, problems with the right shoulder. Didn't think much of it. However, about a month ago was setting groceries on a table and started to fall, so he quickly grabbed for them and felt a \"pop\" in the shoulder.    Locates pain to the lateral shoulder and upper arm.    Ok at rest, has pain at night causing difficulties sleeping.    Treatment with home exercise program, Aleve. Has tried topicals without relief.      He is status post left rotator cuff repair 2019. Has done well with that.      Aggrevated by: reaching, lifting, night  Relieved by: Mallika carranza  Present symptoms: pain with ADL's (dressing),  pain with overhead activities,  pain reaching behind back,  pain reaching out or away from body (flexion/ abduction),  positional night pain,  pain lifting,  weakness with lifting,  Pain location: lateral shoulder, deltoid and upper arm, and located throughout the shoulder joint/diffuse  Pain severity: 1/10  Pain quality: dull, aching, and sharp  Frequency of symptoms: occasionally    Other PMH:  has a past medical history of Vitamin B12 deficiency and Vitamin D deficiencies.    He has no past medical history of Arthritis, Cancer (H), Cerebral infarction (H), Congestive heart failure (H), COPD (chronic " obstructive pulmonary disease) (H), Depressive disorder, Diabetes (H), Heart disease, History of blood transfusion, Hypertension, Thyroid disease, or Uncomplicated asthma.  Patient Active Problem List   Diagnosis    Vitamin D deficiency    Vitamin B12 deficiency    CARDIOVASCULAR SCREENING; LDL GOAL LESS THAN 160    Pure hyperglyceridemia    Screening for prostate cancer    MGUS (monoclonal gammopathy of unknown significance)    Pneumothorax, closed, traumatic, initial encounter    Complete tear of right rotator cuff, unspecified whether traumatic    Chronic right shoulder pain       Surgical Hx:  has a past surgical history that includes surgical history of -  (01/01/1995); surgical history of -  (01/01/1965); ESOPHAGOSCOPY, DIAGNOSTIC; Colonoscopy (03/21/2013); biopsy (2018?); and orthopedic surgery (2019?).    Medications:   Current Outpatient Medications:     Ferrous Sulfate (IRON SUPPLEMENT PO), Take 45 mg by mouth twice a week , Disp: , Rfl:     VITAMIN B-12 PO, Take one tablet every other day, Disp: , Rfl:     VITAMIN D PO, Take one tablet by mouth daily, Disp: , Rfl:     Allergies:   Allergies   Allergen Reactions    Pcn [Penicillins] Hives     Childhood       Social Hx: retired.   reports that he quit smoking about 53 years ago. His smoking use included cigarettes. He started smoking about 55 years ago. He has a 2 pack-year smoking history. He has never used smokeless tobacco. He reports current alcohol use of about 1.7 standard drinks of alcohol per week. He reports that he does not use drugs.    Family Hx: family history includes Hypertension in his brother; Lipids in his brother; Other Cancer in his brother; Prostate Cancer in his brother and brother; Respiratory in his father; Thyroid Disease in his daughter..    REVIEW OF SYSTEMS:    CONSTITUTIONAL:NEGATIVE for fever, chills, change in weight  INTEGUMENTARY/SKIN: NEGATIVE for worrisome rashes, moles or lesions  MUSCULOSKELETAL:See HPI above  NEURO:  "NEGATIVE for weakness, dizziness or paresthesias    PHYSICAL EXAM:  /69 (BP Location: Left arm)   Pulse 80   Ht 1.822 m (5' 11.75\")   Wt 81.1 kg (178 lb 12.8 oz)   BMI 24.42 kg/m     GENERAL APPEARANCE: healthy, alert, no distress  SKIN: no suspicious lesions or rashes  NEURO: Normal strength and tone, mentation intact and speech normal  PSYCH:  mentation appears normal and affect normal, not anxious  RESPIRATORY: No increased work of breathing.  VASCULAR: Radial pulses 2+ and brisk cappillary refill        MUSCULOSKELETAL:    RIGHT UPPER EXTREMITY:  Sensation intact to light touch in median, radial, ulnar and axillary nerve distributions  Palpable 2+ radial pulse, brisk capillary refill to all fingers, wwp  Intact epl fpl fdp edc wrist flexion/extension biceps triceps deltoid    RIGHT SHOULDER:  Shoulder Inspection: no swelling, bruising, discoloration, or obvious deformity or asymmetry  moderate muscle atrophy diffuse    Tender: acromion, anterior capsule, proximal bicep tendon, greater tuberosity, and proximal humerus  Non-tender: SC joint  Range of Motion:   Active:forward flexion 120 degrees, external rotation  20 degrees, internal rotation  T12   Passive: forward flexion 155 degrees, external rotation  50 degrees with crepitus  Strength: forward flexion 4-/5, External rotation 3+/5  Liftoff: Able  Impingement: all grade 2 positive  Special tests: Empty Can: Positive    LEFT UPPER EXTREMITY:  Sensation intact to light touch in median, radial, ulnar and axillary nerve distributions  Palpable 2+ radial pulse, brisk capillary refill to all fingers, wwp  Intact epl fpl fdp edc wrist flexion/extension biceps triceps deltoid    LEFT SHOULDER:  Shoulder Inspection: no swelling, bruising, discoloration, or obvious deformity or asymmetry  Tender: greater tuberosity  Non-tender: AC joint  Range of Motion:   Active:forward flexion 170+ degrees, external rotation  50 degrees, internal rotation  T10  Strength: " forward flexion 5/5, External rotation 5-/5  Liftoff: Able  Impingement: equivocal  Special tests: Empty Can: Negative      X-RAY INTERPRETATION: 3 views right  shoulder obtained 5/21/2024 -- No acute fracture or malalignment. Mild glenohumeral and acromioclavicular joint degenerative  changes. Osteopenia. There is an 8 mm bony excrescence at the distal humeral diaphysis, may represent an osteochondroma.      MRI right  shoulder:  5/13/2024  1. Moderate osteoarthrosis at the right shoulder acromioclavicular joint.  2. Degenerative labral tearing.  3. Full-thickness, complete tears of the right shoulder supraspinatus and infraspinatus tendons with retraction of the torn fibers to the level of the glenohumeral joint.  4. Tendinosis of the right shoulder subscapularis tendon without full-thickness tear or tendon retraction.  5. Atrophy within the supraspinatus muscle with focal atrophy within the infraspinatus muscle.   6. Mild subluxation of the biceps tendon at the bicipital groove with mild tendinosis of the intra-articular biceps tendon.    ASSESSMENT: Jaron Carrera is a 79 year old male, right  -hand dominant with acute right shoulder pain, acute on chronic massive rotator cuff tear with atrophy and retraction, gleno-humeral osteoarthritis, essentially rotator cuff tear arthropathy.      PLAN:   * reviewed imaging and exam findings with patient. This is all consistent with acute on chronic, long-standing rotator cuff tear with underlying degenerative changes. There is notable atrophy of the rotator cuff muscles, which is likely more than 1 months of atrophy  * this is most likely reason for shoulder pain and decreased range of motion, weakness.   * treatment options depend on how symptomatic as well has how aggressive patient wants to be. If not overly symptomatic, we can try a cortisone injection as well as Physical Therapy for deltoid based shoulder exercises, pain control with tylenol and NSAIDS.  *  surgical options would be a cuff tear hemiarthroplasty versus reverse total shoulder arthroplasty. Conventional total shoulder arthroplasty would not work in the absence of an intact, functional rotator cuff, only doomed for failure.  Based on his age, amount of gleno-humeral osteoarthritis, severity of cuff tear and retraction, in the setting of significant atrophy of the muscles, I do not think this is repairable, and even if so, I don't think this likely would give a good outcome in the end.  This is different than his rotator cuff on the left shoulder, which was more acute and no atrophy. I wouldn't predict the same, good outcome with the right shoulder with a repair attempt.  * risks and perceived benefits of nonsurgical and surgical options of each discussed.  * the primary goal of surgery is pain relief, with return or improvement of some function secondary as a bonus. The arm will never work as normal, but hopefully we can get better function than current.  * risks of surgery include, but not limited to, bleeding, infection, pain, scar, damage to adjacent structures (such as nerves, vessels), temporary versus permanent nerve injury, failure to relieve or improve symptoms or function, recurrence of symptoms, stiffness, implant dislocation, implant failure, implant infection, need for further surgery, blood clots, risks of anesthesia and death.    * at this time, patient has no interest in surgery.    * Physical Therapy for deltoid based shoulder exercises   * rest  * activity modification  * tyelnol, NSAIDS  * risks and perceived benefits of cortisone injections discussed. Patient elects  to proceed. See procedure note below.  * return to clinic as needed.     Warren Carl M.D., M.S.  Dept. of Orthopaedic Surgery  [facility]     Large Joint Injection/Arthocentesis: R subacromial bursa    Date/Time: 7/1/2024 11:06 AM    Performed by: Warren Carl MD  Authorized by: Warren Carl MD     Indications:  Pain  Needle Size:  22 G  Guidance: landmark guided    Approach:  Posterolateral  Location:  Shoulder      Site:  R subacromial bursa  Medications:  80 mg triamcinolone 40 MG/ML; 4 mL BUPivacaine 0.25 %  Outcome:  Tolerated well, no immediate complications  Procedure discussed: discussed risks, benefits, and alternatives    Timeout: timeout called immediately prior to procedure    Prep: patient was prepped and draped in usual sterile fashion

## 2024-07-01 ENCOUNTER — OFFICE VISIT (OUTPATIENT)
Dept: ORTHOPEDICS | Facility: CLINIC | Age: 79
End: 2024-07-01
Payer: COMMERCIAL

## 2024-07-01 VITALS
HEIGHT: 72 IN | DIASTOLIC BLOOD PRESSURE: 69 MMHG | BODY MASS INDEX: 24.22 KG/M2 | SYSTOLIC BLOOD PRESSURE: 131 MMHG | WEIGHT: 178.8 LBS | HEART RATE: 80 BPM

## 2024-07-01 DIAGNOSIS — M75.101 RIGHT ROTATOR CUFF TEAR ARTHROPATHY: Primary | ICD-10-CM

## 2024-07-01 DIAGNOSIS — M12.811 RIGHT ROTATOR CUFF TEAR ARTHROPATHY: Primary | ICD-10-CM

## 2024-07-01 PROCEDURE — 20610 DRAIN/INJ JOINT/BURSA W/O US: CPT | Mod: RT | Performed by: ORTHOPAEDIC SURGERY

## 2024-07-01 RX ORDER — TRIAMCINOLONE ACETONIDE 40 MG/ML
80 INJECTION, SUSPENSION INTRA-ARTICULAR; INTRAMUSCULAR
Status: SHIPPED | OUTPATIENT
Start: 2024-07-01

## 2024-07-01 RX ORDER — BUPIVACAINE HYDROCHLORIDE 2.5 MG/ML
4 INJECTION, SOLUTION INFILTRATION; PERINEURAL
Status: SHIPPED | OUTPATIENT
Start: 2024-07-01

## 2024-07-01 RX ADMIN — TRIAMCINOLONE ACETONIDE 80 MG: 40 INJECTION, SUSPENSION INTRA-ARTICULAR; INTRAMUSCULAR at 11:06

## 2024-07-01 RX ADMIN — BUPIVACAINE HYDROCHLORIDE 4 ML: 2.5 INJECTION, SOLUTION INFILTRATION; PERINEURAL at 11:06

## 2024-07-01 ASSESSMENT — PAIN SCALES - GENERAL: PAINLEVEL: MODERATE PAIN (4)

## 2024-07-01 NOTE — LETTER
"7/1/2024      Jaron Carrera  [address] [place] [address]      Dear Colleague,    Thank you for referring your patient, Jaron Carrera, to the [facility]. Please see a copy of my visit note below.    CHIEF COMPLAINT:   Chief Complaint   Patient presents with     Right Shoulder - Follow Up     Right shoulder injury. 2 months out. 2 sessions of PT, continuing exercises at home. Worse when sleeping or lifting for long periods. Interested in cortisone injection - has not had cortisone before. Taking tylenol PRN.               HISTORY:  Jaron Carrera is a 79 year old male, right  -hand dominant, who is seen in followup for right  shoulder injury that occurred 2+ months ago. Returns today having gone to some therapy and doing home exercise program. He's interested in a cortisone injection today. He's not had cortisone in the past. Taking tylenol as needed for pain.     He has been golfing and has noticed some pain, problems with the right shoulder. Didn't think much of it. However, about a month ago was setting groceries on a table and started to fall, so he quickly grabbed for them and felt a \"pop\" in the shoulder.    Locates pain to the lateral shoulder and upper arm.    Ok at rest, has pain at night causing difficulties sleeping.    Treatment with home exercise program, Aleve. Has tried topicals without relief.      He is status post left rotator cuff repair 2019. Has done well with that.      Aggrevated by: reaching, lifting, night  Relieved by: Mallika carranza  Present symptoms: pain with ADL's (dressing),  pain with overhead activities,  pain reaching behind back,  pain reaching out or away from body (flexion/ abduction),  positional night pain,  pain lifting,  weakness with lifting,  Pain location: lateral shoulder, deltoid and upper arm, and located throughout the shoulder joint/diffuse  Pain severity: 1/10  Pain quality: dull, aching, and sharp  Frequency of " symptoms: occasionally    Other PMH:  has a past medical history of Vitamin B12 deficiency and Vitamin D deficiencies.    He has no past medical history of Arthritis, Cancer (H), Cerebral infarction (H), Congestive heart failure (H), COPD (chronic obstructive pulmonary disease) (H), Depressive disorder, Diabetes (H), Heart disease, History of blood transfusion, Hypertension, Thyroid disease, or Uncomplicated asthma.  Patient Active Problem List   Diagnosis     Vitamin D deficiency     Vitamin B12 deficiency     CARDIOVASCULAR SCREENING; LDL GOAL LESS THAN 160     Pure hyperglyceridemia     Screening for prostate cancer     MGUS (monoclonal gammopathy of unknown significance)     Pneumothorax, closed, traumatic, initial encounter     Complete tear of right rotator cuff, unspecified whether traumatic     Chronic right shoulder pain       Surgical Hx:  has a past surgical history that includes surgical history of -  (01/01/1995); surgical history of -  (01/01/1965); ESOPHAGOSCOPY, DIAGNOSTIC; Colonoscopy (03/21/2013); biopsy (2018?); and orthopedic surgery (2019?).    Medications:   Current Outpatient Medications:      Ferrous Sulfate (IRON SUPPLEMENT PO), Take 45 mg by mouth twice a week , Disp: , Rfl:      VITAMIN B-12 PO, Take one tablet every other day, Disp: , Rfl:      VITAMIN D PO, Take one tablet by mouth daily, Disp: , Rfl:     Allergies:   Allergies   Allergen Reactions     Pcn [Penicillins] Hives     Childhood       Social Hx: retired.   reports that he quit smoking about 53 years ago. His smoking use included cigarettes. He started smoking about 55 years ago. He has a 2 pack-year smoking history. He has never used smokeless tobacco. He reports current alcohol use of about 1.7 standard drinks of alcohol per week. He reports that he does not use drugs.    Family Hx: family history includes Hypertension in his brother; Lipids in his brother; Other Cancer in his brother; Prostate Cancer in his brother and  "brother; Respiratory in his father; Thyroid Disease in his daughter..    REVIEW OF SYSTEMS:    CONSTITUTIONAL:NEGATIVE for fever, chills, change in weight  INTEGUMENTARY/SKIN: NEGATIVE for worrisome rashes, moles or lesions  MUSCULOSKELETAL:See HPI above  NEURO: NEGATIVE for weakness, dizziness or paresthesias    PHYSICAL EXAM:  /69 (BP Location: Left arm)   Pulse 80   Ht 1.822 m (5' 11.75\")   Wt 81.1 kg (178 lb 12.8 oz)   BMI 24.42 kg/m     GENERAL APPEARANCE: healthy, alert, no distress  SKIN: no suspicious lesions or rashes  NEURO: Normal strength and tone, mentation intact and speech normal  PSYCH:  mentation appears normal and affect normal, not anxious  RESPIRATORY: No increased work of breathing.  VASCULAR: Radial pulses 2+ and brisk cappillary refill        MUSCULOSKELETAL:    RIGHT UPPER EXTREMITY:  Sensation intact to light touch in median, radial, ulnar and axillary nerve distributions  Palpable 2+ radial pulse, brisk capillary refill to all fingers, wwp  Intact epl fpl fdp edc wrist flexion/extension biceps triceps deltoid    RIGHT SHOULDER:  Shoulder Inspection: no swelling, bruising, discoloration, or obvious deformity or asymmetry  moderate muscle atrophy diffuse    Tender: acromion, anterior capsule, proximal bicep tendon, greater tuberosity, and proximal humerus  Non-tender: SC joint  Range of Motion:   Active:forward flexion 120 degrees, external rotation  20 degrees, internal rotation  T12   Passive: forward flexion 155 degrees, external rotation  50 degrees with crepitus  Strength: forward flexion 4-/5, External rotation 3+/5  Liftoff: Able  Impingement: all grade 2 positive  Special tests: Empty Can: Positive    LEFT UPPER EXTREMITY:  Sensation intact to light touch in median, radial, ulnar and axillary nerve distributions  Palpable 2+ radial pulse, brisk capillary refill to all fingers, wwp  Intact epl fpl fdp edc wrist flexion/extension biceps triceps deltoid    LEFT " SHOULDER:  Shoulder Inspection: no swelling, bruising, discoloration, or obvious deformity or asymmetry  Tender: greater tuberosity  Non-tender: AC joint  Range of Motion:   Active:forward flexion 170+ degrees, external rotation  50 degrees, internal rotation  T10  Strength: forward flexion 5/5, External rotation 5-/5  Liftoff: Able  Impingement: equivocal  Special tests: Empty Can: Negative      X-RAY INTERPRETATION: 3 views right  shoulder obtained 5/21/2024 -- No acute fracture or malalignment. Mild glenohumeral and acromioclavicular joint degenerative  changes. Osteopenia. There is an 8 mm bony excrescence at the distal humeral diaphysis, may represent an osteochondroma.      MRI right  shoulder:  5/13/2024  1. Moderate osteoarthrosis at the right shoulder acromioclavicular joint.  2. Degenerative labral tearing.  3. Full-thickness, complete tears of the right shoulder supraspinatus and infraspinatus tendons with retraction of the torn fibers to the level of the glenohumeral joint.  4. Tendinosis of the right shoulder subscapularis tendon without full-thickness tear or tendon retraction.  5. Atrophy within the supraspinatus muscle with focal atrophy within the infraspinatus muscle.   6. Mild subluxation of the biceps tendon at the bicipital groove with mild tendinosis of the intra-articular biceps tendon.    ASSESSMENT: Jaron Carrera is a 79 year old male, right  -hand dominant with acute right shoulder pain, acute on chronic massive rotator cuff tear with atrophy and retraction, gleno-humeral osteoarthritis, essentially rotator cuff tear arthropathy.      PLAN:   * reviewed imaging and exam findings with patient. This is all consistent with acute on chronic, long-standing rotator cuff tear with underlying degenerative changes. There is notable atrophy of the rotator cuff muscles, which is likely more than 1 months of atrophy  * this is most likely reason for shoulder pain and decreased range of motion,  weakness.   * treatment options depend on how symptomatic as well has how aggressive patient wants to be. If not overly symptomatic, we can try a cortisone injection as well as Physical Therapy for deltoid based shoulder exercises, pain control with tylenol and NSAIDS.  * surgical options would be a cuff tear hemiarthroplasty versus reverse total shoulder arthroplasty. Conventional total shoulder arthroplasty would not work in the absence of an intact, functional rotator cuff, only doomed for failure.  Based on his age, amount of gleno-humeral osteoarthritis, severity of cuff tear and retraction, in the setting of significant atrophy of the muscles, I do not think this is repairable, and even if so, I don't think this likely would give a good outcome in the end.  This is different than his rotator cuff on the left shoulder, which was more acute and no atrophy. I wouldn't predict the same, good outcome with the right shoulder with a repair attempt.  * risks and perceived benefits of nonsurgical and surgical options of each discussed.  * the primary goal of surgery is pain relief, with return or improvement of some function secondary as a bonus. The arm will never work as normal, but hopefully we can get better function than current.  * risks of surgery include, but not limited to, bleeding, infection, pain, scar, damage to adjacent structures (such as nerves, vessels), temporary versus permanent nerve injury, failure to relieve or improve symptoms or function, recurrence of symptoms, stiffness, implant dislocation, implant failure, implant infection, need for further surgery, blood clots, risks of anesthesia and death.    * at this time, patient has no interest in surgery.    * Physical Therapy for deltoid based shoulder exercises   * rest  * activity modification  * tyelnol, NSAIDS  * risks and perceived benefits of cortisone injections discussed. Patient elects  to proceed. See procedure note below.  * return to  clinic as needed.     Warren Carl M.D., M.S.  Dept. of Orthopaedic Surgery  [facility]     Large Joint Injection/Arthocentesis: R subacromial bursa    Date/Time: 7/1/2024 11:06 AM    Performed by: Warren Carl MD  Authorized by: Warren Carl MD    Indications:  Pain  Needle Size:  22 G  Guidance: landmark guided    Approach:  Posterolateral  Location:  Shoulder      Site:  R subacromial bursa  Medications:  80 mg triamcinolone 40 MG/ML; 4 mL BUPivacaine 0.25 %  Outcome:  Tolerated well, no immediate complications  Procedure discussed: discussed risks, benefits, and alternatives    Timeout: timeout called immediately prior to procedure    Prep: patient was prepped and draped in usual sterile fashion          Again, thank you for allowing me to participate in the care of your patient.        Sincerely,        Warren Carl MD

## 2024-07-07 ASSESSMENT — ACTIVITIES OF DAILY LIVING (ADL)
PUTTING_ON_AN_UNDERSHIRT_OR_A_PULLOVER_SWEATER: 3
PLEASE_INDICATE_YOR_PRIMARY_REASON_FOR_REFERRAL_TO_THERAPY:: SHOULDER
PUTTING_ON_A_SHIRT_THAT_BUTTONS_DOWN_THE_FRONT: 3
WASHING_YOUR_BACK: 3
TOUCHING_THE_BACK_OF_YOUR_NECK: 0
PUTTING_ON_YOUR_PANTS: 3
REMOVING_SOMETHING_FROM_YOUR_BACK_POCKET: 9
WASHING_YOUR_HAIR?: 3
AT_ITS_WORST?: 3
PUSHING_WITH_THE_INVOLVED_ARM: 3
WHEN_LYING_ON_THE_INVOLVED_SIDE: 5
CARRYING_A_HEAVY_OBJECT_OF_10_POUNDS: 3
REACHING_FOR_SOMETHING_ON_A_HIGH_SHELF: 4
PLACING_AN_OBJECT_ON_A_HIGH_SHELF: 5

## 2024-07-09 ENCOUNTER — THERAPY VISIT (OUTPATIENT)
Dept: PHYSICAL THERAPY | Facility: CLINIC | Age: 79
End: 2024-07-09
Payer: COMMERCIAL

## 2024-07-09 DIAGNOSIS — G89.29 CHRONIC RIGHT SHOULDER PAIN: Primary | ICD-10-CM

## 2024-07-09 DIAGNOSIS — M25.511 CHRONIC RIGHT SHOULDER PAIN: Primary | ICD-10-CM

## 2024-07-09 PROCEDURE — 97110 THERAPEUTIC EXERCISES: CPT | Mod: GP

## 2024-11-19 ENCOUNTER — DOCUMENTATION ONLY (OUTPATIENT)
Dept: FAMILY MEDICINE | Facility: CLINIC | Age: 79
End: 2024-11-19

## 2024-11-19 NOTE — PROGRESS NOTES
Patient has an upcoming lab appointment on 11.26.24 at 3:15 pm. Please review and place future orders that may be needed. Otherwise please call patient to cancel lab appointment.    Thank you,  [facility] lab staff

## 2024-11-26 ENCOUNTER — LAB (OUTPATIENT)
Dept: LAB | Facility: CLINIC | Age: 79
End: 2024-11-26
Payer: COMMERCIAL

## 2024-11-26 DIAGNOSIS — D47.2 MGUS (MONOCLONAL GAMMOPATHY OF UNKNOWN SIGNIFICANCE): ICD-10-CM

## 2024-11-26 DIAGNOSIS — Z13.220 LIPID SCREENING: ICD-10-CM

## 2024-11-26 DIAGNOSIS — Z12.5 SCREENING FOR PROSTATE CANCER: ICD-10-CM

## 2024-11-26 LAB
BASOPHILS # BLD AUTO: 0 10E3/UL (ref 0–0.2)
BASOPHILS NFR BLD AUTO: 0 %
EOSINOPHIL # BLD AUTO: 0.1 10E3/UL (ref 0–0.7)
EOSINOPHIL NFR BLD AUTO: 2 %
ERYTHROCYTE [DISTWIDTH] IN BLOOD BY AUTOMATED COUNT: 11.3 % (ref 10–15)
HCT VFR BLD AUTO: 45.2 % (ref 40–53)
HGB BLD-MCNC: 15.7 G/DL (ref 13.3–17.7)
IMM GRANULOCYTES # BLD: 0 10E3/UL
IMM GRANULOCYTES NFR BLD: 0 %
LYMPHOCYTES # BLD AUTO: 1.5 10E3/UL (ref 0.8–5.3)
LYMPHOCYTES NFR BLD AUTO: 23 %
MCH RBC QN AUTO: 33.4 PG (ref 26.5–33)
MCHC RBC AUTO-ENTMCNC: 34.7 G/DL (ref 31.5–36.5)
MCV RBC AUTO: 96 FL (ref 78–100)
MONOCYTES # BLD AUTO: 0.4 10E3/UL (ref 0–1.3)
MONOCYTES NFR BLD AUTO: 7 %
NEUTROPHILS # BLD AUTO: 4.5 10E3/UL (ref 1.6–8.3)
NEUTROPHILS NFR BLD AUTO: 69 %
PLATELET # BLD AUTO: 184 10E3/UL (ref 150–450)
RBC # BLD AUTO: 4.7 10E6/UL (ref 4.4–5.9)
WBC # BLD AUTO: 6.6 10E3/UL (ref 4–11)

## 2024-11-26 PROCEDURE — 36415 COLL VENOUS BLD VENIPUNCTURE: CPT

## 2024-11-26 PROCEDURE — 80061 LIPID PANEL: CPT

## 2024-11-26 PROCEDURE — 82607 VITAMIN B-12: CPT

## 2024-11-26 PROCEDURE — 83550 IRON BINDING TEST: CPT

## 2024-11-26 PROCEDURE — 85025 COMPLETE CBC W/AUTO DIFF WBC: CPT

## 2024-11-26 PROCEDURE — G0103 PSA SCREENING: HCPCS

## 2024-11-26 PROCEDURE — 82728 ASSAY OF FERRITIN: CPT

## 2024-11-26 PROCEDURE — 83540 ASSAY OF IRON: CPT

## 2024-11-27 LAB
CHOLEST SERPL-MCNC: 193 MG/DL
FASTING STATUS PATIENT QL REPORTED: NO
FERRITIN SERPL-MCNC: 154 NG/ML (ref 31–409)
HDLC SERPL-MCNC: 55 MG/DL
IRON BINDING CAPACITY (ROCHE): 296 UG/DL (ref 240–430)
IRON SATN MFR SERPL: 32 % (ref 15–46)
IRON SERPL-MCNC: 96 UG/DL (ref 61–157)
LDLC SERPL CALC-MCNC: 116 MG/DL
NONHDLC SERPL-MCNC: 138 MG/DL
PSA SERPL DL<=0.01 NG/ML-MCNC: 0.31 NG/ML (ref 0–6.5)
TRIGL SERPL-MCNC: 111 MG/DL
VIT B12 SERPL-MCNC: 702 PG/ML (ref 232–1245)

## 2024-11-28 SDOH — HEALTH STABILITY: PHYSICAL HEALTH: ON AVERAGE, HOW MANY DAYS PER WEEK DO YOU ENGAGE IN MODERATE TO STRENUOUS EXERCISE (LIKE A BRISK WALK)?: 5 DAYS

## 2024-11-28 SDOH — HEALTH STABILITY: PHYSICAL HEALTH: ON AVERAGE, HOW MANY MINUTES DO YOU ENGAGE IN EXERCISE AT THIS LEVEL?: 40 MIN

## 2024-11-28 ASSESSMENT — SOCIAL DETERMINANTS OF HEALTH (SDOH): HOW OFTEN DO YOU GET TOGETHER WITH FRIENDS OR RELATIVES?: TWICE A WEEK

## 2024-12-03 ENCOUNTER — OFFICE VISIT (OUTPATIENT)
Dept: FAMILY MEDICINE | Facility: CLINIC | Age: 79
End: 2024-12-03
Attending: PHYSICIAN ASSISTANT
Payer: COMMERCIAL

## 2024-12-03 VITALS
DIASTOLIC BLOOD PRESSURE: 64 MMHG | SYSTOLIC BLOOD PRESSURE: 124 MMHG | BODY MASS INDEX: 25.37 KG/M2 | HEIGHT: 71 IN | OXYGEN SATURATION: 99 % | HEART RATE: 74 BPM | RESPIRATION RATE: 20 BRPM | WEIGHT: 181.2 LBS | TEMPERATURE: 97.3 F

## 2024-12-03 DIAGNOSIS — Z00.00 ENCOUNTER FOR MEDICARE ANNUAL WELLNESS EXAM: Primary | ICD-10-CM

## 2024-12-03 PROCEDURE — 99397 PER PM REEVAL EST PAT 65+ YR: CPT | Performed by: PHYSICIAN ASSISTANT

## 2024-12-03 NOTE — PROGRESS NOTES
Preventive Care Visit  [facility] Spain PA-C, Family Medicine  Dec 3, 2024      Calvin Rose is a 79 year old, presenting for the following:  Wellness Visit and Health Maintenance (Patient is not fasting/)        12/3/2024    10:24 AM   Additional Questions   Roomed by Khadijah Rodas CMA   Accompanied by none         12/3/2024    10:24 AM   Patient Reported Additional Medications   Patient reports taking the following new medications none           HPI    Health Care Directive  Patient has a Health Care Directive on file  Advance care planning document is on file and is current.      11/28/2024   General Health   How would you rate your overall physical health? Excellent   Feel stress (tense, anxious, or unable to sleep) Not at all            11/28/2024   Nutrition   Diet: Regular (no restrictions)            11/28/2024   Exercise   Days per week of moderate/strenous exercise 5 days   Average minutes spent exercising at this level 40 min            11/28/2024   Social Factors   Frequency of gathering with friends or relatives Twice a week   Worry food won't last until get money to buy more No   Food not last or not have enough money for food? No   Do you have housing? (Housing is defined as stable permanent housing and does not include staying ouside in a car, in a tent, in an abandoned building, in an overnight shelter, or couch-surfing.) Yes   Are you worried about losing your housing? No   Lack of transportation? No   Unable to get utilities (heat,electricity)? No            11/28/2024   Fall Risk   Fallen 2 or more times in the past year? No     No    Trouble with walking or balance? No     No        Patient-reported    Multiple values from one day are sorted in reverse-chronological order          11/28/2024   Activities of Daily Living- Home Safety   Needs help with the following daily activites None of the above   Safety concerns in the home None of the above             2024   Dental   Dentist two times every year? Yes            2024   Hearing Screening   Hearing concerns? None of the above            2024   Driving Risk Screening   Patient/family members have concerns about driving No            2024   General Alertness/Fatigue Screening   Have you been more tired than usual lately? No            2024   Urinary Incontinence Screening   Bothered by leaking urine in past 6 months No            2024   TB Screening   Were you born outside of the [place]? No            Today's PHQ-2 Score:       2024    11:47 AM   PHQ-2 (  [employer])   Q1: Little interest or pleasure in doing things 0    Q2: Feeling down, depressed or hopeless 0    PHQ-2 Score 0    Q1: Little interest or pleasure in doing things Not at all   Q2: Feeling down, depressed or hopeless Not at all   PHQ-2 Score 0       Patient-reported           2024   Substance Use   Alcohol more than 3/day or more than 7/wk No   Do you have a current opioid prescription? No   How severe/bad is pain from 1 to 10? 0/10 (No Pain)   Do you use any other substances recreationally? No        Social History     Tobacco Use    Smoking status: Former     Current packs/day: 0.00     Average packs/day: 1 pack/day for 2.0 years (2.0 ttl pk-yrs)     Types: Cigarettes     Start date: 1968     Quit date: 1970     Years since quittin.4    Smokeless tobacco: Never   Vaping Use    Vaping status: Never Used   Substance Use Topics    Alcohol use: Yes     Alcohol/week: 1.7 standard drinks of alcohol     Types: 2 Cans of beer per week     Comment: 1-2 beers weekly    Drug use: No       ASCVD Risk   The 10-year ASCVD risk score (Velma HEARN, et al., 2019) is: 28.8%    Values used to calculate the score:      Age: 79 years      Sex: Male      Is Non- : No      Diabetic: No      Tobacco smoker: No      Systolic Blood Pressure: 124 mmHg      Is BP treated: No      HDL  Cholesterol: 55 mg/dL      Total Cholesterol: 193 mg/dL    Fracture Risk Assessment Tool  Link to Frax Calculator  Use the information below to complete the Frax calculator  : 1945  Sex: male  Weight (kg): 82.2 kg (actual weight)  Height (cm): 180.3 cm  Previous Fragility Fracture:  No  History of parent with fractured hip:  No  Current Smoking:  No  Patient has been on glucocorticoids for more than 3 months (5mg/day or more): No  Rheumatoid Arthritis on Problem List:  No  Secondary Osteoporosis on Problem List:  No  Consumes 3 or more units of alcohol per day: No  Femoral Neck BMD (g/cm2)            Reviewed and updated as needed this visit by Provider                    Past Medical History:   Diagnosis Date    Vitamin B12 deficiency     Vitamin D deficiencies      Past Surgical History:   Procedure Laterality Date    BIOPSY  ?    Bone Marrow    COLONOSCOPY  2013    Procedure: COLONOSCOPY;  COLONOSCOPY SCREEN;  Surgeon: Walker Thompson MD;  Location: [facility] OR    HC ESOPHAGOSCOPY, DIAGNOSTIC      ORTHOPEDIC SURGERY  ?    Shoulder Surgery    SURGICAL HISTORY OF -   1995    Varicose Veins- Stripping     SURGICAL HISTORY OF -   1965    Lt Knee Surgery torn MM      BP Readings from Last 3 Encounters:   24 124/64   24 131/69   24 136/75    Wt Readings from Last 3 Encounters:   24 82.2 kg (181 lb 3.2 oz)   24 81.1 kg (178 lb 12.8 oz)   24 82.6 kg (182 lb)                  Patient Active Problem List   Diagnosis    Vitamin D deficiency    Vitamin B12 deficiency    CARDIOVASCULAR SCREENING; LDL GOAL LESS THAN 160    Pure hyperglyceridemia    Screening for prostate cancer    MGUS (monoclonal gammopathy of unknown significance)    Pneumothorax, closed, traumatic, initial encounter    Complete tear of right rotator cuff, unspecified whether traumatic    Chronic right shoulder pain     Past Surgical History:   Procedure Laterality Date    BIOPSY  ?     Bone Marrow    COLONOSCOPY  2013    Procedure: COLONOSCOPY;  COLONOSCOPY SCREEN;  Surgeon: Walker Thompson MD;  Location: [facility] OR    HC ESOPHAGOSCOPY, DIAGNOSTIC      ORTHOPEDIC SURGERY  2019?    Shoulder Surgery    SURGICAL HISTORY OF -   1995    Varicose Veins- Stripping     SURGICAL HISTORY OF -   1965    Lt Knee Surgery torn MM        Social History     Tobacco Use    Smoking status: Former     Current packs/day: 0.00     Average packs/day: 1 pack/day for 2.0 years (2.0 ttl pk-yrs)     Types: Cigarettes     Start date: 1968     Quit date: 1970     Years since quittin.4    Smokeless tobacco: Never   Substance Use Topics    Alcohol use: Yes     Alcohol/week: 1.7 standard drinks of alcohol     Types: 2 Cans of beer per week     Comment: 1-2 beers weekly     Family History   Problem Relation Age of Onset    Respiratory Father         COPD    Lipids Brother     Prostate Cancer Brother     Other Cancer Brother     Thyroid Disease Daughter     Hypertension Brother     Prostate Cancer Brother     C.A.D. No family hx of     Cancer - colorectal No family hx of     Cerebrovascular Disease No family hx of     Diabetes No family hx of          Current Outpatient Medications   Medication Sig Dispense Refill    Ferrous Sulfate (IRON SUPPLEMENT PO) Take 45 mg by mouth twice a week       VITAMIN B-12 PO Take one tablet every other day      VITAMIN D PO Take one tablet by mouth daily       Allergies   Allergen Reactions    Pcn [Penicillins] Hives     Childhood     Recent Labs   Lab Test 24  1505 24  0914 23  0908 22  0834 21  0831 21  0953 20  0906 19  0857 18  1510 18  1212 17  1041 10/18/16  0805   *  --   --   --   --  96  --   --   --   --   --  110*   HDL 55  --   --   --   --  48  --   --   --   --   --  50   TRIG 111  --   --   --   --  153*  --   --   --   --   --  211*   ALT  --  27 23 34   < >  --  35  --    --  28   < >  --    CR  --  1.20* 1.15 1.16   < >  --  1.12 1.06   < > 0.91   < >  --    GFRESTIMATED  --  62 65 65   < >  --  64 69   < > 82   < >  --    GFRESTBLACK  --   --   --   --   --   --  74 80   < > >90   < >  --    POTASSIUM  --  3.9 4.5 4.4   < >  --  4.3 4.3   < > 4.0   < >  --    TSH  --   --   --   --   --   --   --   --   --  1.73  --   --     < > = values in this interval not displayed.      Current providers sharing in care for this patient include:  Patient Care Team:  Cirilo Spain PA-C as PCP - General (Physician Assistant)  Ana Rosa Medel MD as Assigned Cancer Care Provider  Gabo Lorenzo MD as MD (Urology)  Cirilo Spain PA-C as Assigned PCP  Gabo Lorenzo MD as Assigned Surgical Provider  Warren Carl MD as Assigned Musculoskeletal Provider    The following health maintenance items are reviewed in Epic and correct as of today:  Health Maintenance   Topic Date Due    DTAP/TDAP/TD IMMUNIZATION (1 - Tdap) 07/21/2010    RSV VACCINE (1 - 1-dose 75+ series) Never done    COVID-19 Vaccine (6 - 2024-25 season) 09/01/2024    ANNUAL REVIEW OF HM ORDERS  11/09/2024    MEDICARE ANNUAL WELLNESS VISIT  11/27/2024    LIPID  11/26/2025    FALL RISK ASSESSMENT  12/03/2025    GLUCOSE  03/08/2027    ADVANCE CARE PLANNING  12/03/2029    HEPATITIS C SCREENING  Completed    PHQ-2 (once per calendar year)  Completed    INFLUENZA VACCINE  Completed    Pneumococcal Vaccine: 65+ Years  Completed    ZOSTER IMMUNIZATION  Completed    HPV IMMUNIZATION  Aged Out    MENINGITIS IMMUNIZATION  Aged Out    RSV MONOCLONAL ANTIBODY  Aged Out    COLORECTAL CANCER SCREENING  Discontinued    LUNG CANCER SCREENING  Discontinued         Review of Systems  Constitutional, HEENT, cardiovascular, pulmonary, GI, , musculoskeletal, neuro, skin, endocrine and psych systems are negative, except as otherwise noted.     Objective    Exam  /64   Pulse 74   Temp 97.3  F (36.3  C) (Oral)   Resp 20   Ht 1.803 m (5'  "11\")   Wt 82.2 kg (181 lb 3.2 oz)   SpO2 99%   BMI 25.27 kg/m     Estimated body mass index is 25.27 kg/m  as calculated from the following:    Height as of this encounter: 1.803 m (5' 11\").    Weight as of this encounter: 82.2 kg (181 lb 3.2 oz).    Physical Exam  GENERAL: alert and no distress  EYES: Eyes grossly normal to inspection, PERRL and conjunctivae and sclerae normal  HENT: ear canals and TM's normal, nose and mouth without ulcers or lesions  NECK: no adenopathy, no asymmetry, masses, or scars  RESP: lungs clear to auscultation - no rales, rhonchi or wheezes  CV: regular rate and rhythm, normal S1 S2, no S3 or S4, no murmur, click or rub, no peripheral edema  ABDOMEN: soft, nontender, no hepatosplenomegaly, no masses and bowel sounds normal  MS: no gross musculoskeletal defects noted, no edema  SKIN: no suspicious lesions or rashes  NEURO: Normal strength and tone, mentation intact and speech normal  PSYCH: mentation appears normal, affect normal/bright        12/3/2024   Mini Cog   Mini-Cog Not Completed (choose reason) Patient declines          Patient declines, but there are concerns for cognitive deficits.      Rose was seen today for wellness visit and health maintenance.    Diagnoses and all orders for this visit:    Encounter for Medicare annual wellness exam    Other orders  -     PRIMARY CARE FOLLOW-UP SCHEDULING  -     REVIEW OF HEALTH MAINTENANCE PROTOCOL ORDERS      Lower fat, higher fiber diet and consistent exercise.         Signed Electronically by: Cirilo Spain PA-C    "

## 2025-03-10 ENCOUNTER — LAB (OUTPATIENT)
Dept: LAB | Facility: CLINIC | Age: 80
End: 2025-03-10
Payer: COMMERCIAL

## 2025-03-10 DIAGNOSIS — D47.2 MGUS (MONOCLONAL GAMMOPATHY OF UNKNOWN SIGNIFICANCE): ICD-10-CM

## 2025-03-12 ENCOUNTER — LAB (OUTPATIENT)
Dept: LAB | Facility: CLINIC | Age: 80
End: 2025-03-12
Payer: COMMERCIAL

## 2025-03-12 DIAGNOSIS — D47.2 MGUS (MONOCLONAL GAMMOPATHY OF UNKNOWN SIGNIFICANCE): ICD-10-CM

## 2025-03-12 LAB
BASOPHILS # BLD AUTO: 0 10E3/UL (ref 0–0.2)
BASOPHILS NFR BLD AUTO: 1 %
EOSINOPHIL # BLD AUTO: 0.1 10E3/UL (ref 0–0.7)
EOSINOPHIL NFR BLD AUTO: 1 %
ERYTHROCYTE [DISTWIDTH] IN BLOOD BY AUTOMATED COUNT: 12.2 % (ref 10–15)
HCT VFR BLD AUTO: 44.6 % (ref 40–53)
HGB BLD-MCNC: 15.3 G/DL (ref 13.3–17.7)
IMM GRANULOCYTES # BLD: 0 10E3/UL
IMM GRANULOCYTES NFR BLD: 0 %
LYMPHOCYTES # BLD AUTO: 1.2 10E3/UL (ref 0.8–5.3)
LYMPHOCYTES NFR BLD AUTO: 19 %
MCH RBC QN AUTO: 32.9 PG (ref 26.5–33)
MCHC RBC AUTO-ENTMCNC: 34.3 G/DL (ref 31.5–36.5)
MCV RBC AUTO: 96 FL (ref 78–100)
MONOCYTES # BLD AUTO: 0.4 10E3/UL (ref 0–1.3)
MONOCYTES NFR BLD AUTO: 7 %
NEUTROPHILS # BLD AUTO: 4.6 10E3/UL (ref 1.6–8.3)
NEUTROPHILS NFR BLD AUTO: 72 %
PLATELET # BLD AUTO: 180 10E3/UL (ref 150–450)
RBC # BLD AUTO: 4.65 10E6/UL (ref 4.4–5.9)
WBC # BLD AUTO: 6.4 10E3/UL (ref 4–11)

## 2025-03-12 PROCEDURE — 80053 COMPREHEN METABOLIC PANEL: CPT

## 2025-03-12 PROCEDURE — 84165 PROTEIN E-PHORESIS SERUM: CPT | Performed by: PATHOLOGY

## 2025-03-12 PROCEDURE — 86334 IMMUNOFIX E-PHORESIS SERUM: CPT | Performed by: PATHOLOGY

## 2025-03-12 PROCEDURE — 84155 ASSAY OF PROTEIN SERUM: CPT | Mod: 59

## 2025-03-12 PROCEDURE — 81050 URINALYSIS VOLUME MEASURE: CPT | Performed by: PATHOLOGY

## 2025-03-12 PROCEDURE — 82784 ASSAY IGA/IGD/IGG/IGM EACH: CPT

## 2025-03-12 PROCEDURE — 84166 PROTEIN E-PHORESIS/URINE/CSF: CPT | Performed by: PATHOLOGY

## 2025-03-12 PROCEDURE — 84156 ASSAY OF PROTEIN URINE: CPT | Performed by: PATHOLOGY

## 2025-03-12 PROCEDURE — 36415 COLL VENOUS BLD VENIPUNCTURE: CPT

## 2025-03-12 PROCEDURE — 83521 IG LIGHT CHAINS FREE EACH: CPT

## 2025-03-12 PROCEDURE — 85025 COMPLETE CBC W/AUTO DIFF WBC: CPT

## 2025-03-13 LAB
ALBUMIN MFR UR ELPH: 11.9 %
ALBUMIN MFR UR ELPH: 8.7 MG/DL
ALBUMIN SERPL BCG-MCNC: 4.5 G/DL (ref 3.5–5.2)
ALBUMIN SERPL ELPH-MCNC: 4.4 G/DL (ref 3.7–5.1)
ALP SERPL-CCNC: 87 U/L (ref 40–150)
ALPHA1 GLOB MFR UR ELPH: 7 %
ALPHA1 GLOB SERPL ELPH-MCNC: 0.2 G/DL (ref 0.2–0.4)
ALPHA2 GLOB MFR UR ELPH: 11.4 %
ALPHA2 GLOB SERPL ELPH-MCNC: 0.9 G/DL (ref 0.5–0.9)
ALT SERPL W P-5'-P-CCNC: 31 U/L (ref 0–70)
ANION GAP SERPL CALCULATED.3IONS-SCNC: 11 MMOL/L (ref 7–15)
AST SERPL W P-5'-P-CCNC: 29 U/L (ref 0–45)
B-GLOBULIN MFR UR ELPH: 10.5 %
B-GLOBULIN SERPL ELPH-MCNC: 0.6 G/DL (ref 0.6–1)
BILIRUB SERPL-MCNC: 0.7 MG/DL
BUN SERPL-MCNC: 20.1 MG/DL (ref 8–23)
CALCIUM SERPL-MCNC: 9.6 MG/DL (ref 8.8–10.4)
CHLORIDE SERPL-SCNC: 104 MMOL/L (ref 98–107)
COLLECT DURATION TIME UR: 24 H
CREAT SERPL-MCNC: 1.09 MG/DL (ref 0.67–1.17)
EGFRCR SERPLBLD CKD-EPI 2021: 69 ML/MIN/1.73M2
GAMMA GLOB MFR UR ELPH: 59.2 %
GAMMA GLOB SERPL ELPH-MCNC: 1.6 G/DL (ref 0.7–1.6)
GLUCOSE SERPL-MCNC: 96 MG/DL (ref 70–99)
HCO3 SERPL-SCNC: 25 MMOL/L (ref 22–29)
IGA SERPL-MCNC: 73 MG/DL (ref 84–499)
IGG SERPL-MCNC: 1701 MG/DL (ref 610–1616)
IGM SERPL-MCNC: 46 MG/DL (ref 35–242)
KAPPA LC FREE SER-MCNC: 12.64 MG/DL (ref 0.33–1.94)
KAPPA LC FREE/LAMBDA FREE SER NEPH: 21.07 {RATIO} (ref 0.26–1.65)
LAMBDA LC FREE SERPL-MCNC: 0.6 MG/DL (ref 0.57–2.63)
LOCATION OF TASK: ABNORMAL
LOCATION OF TASK: ABNORMAL
LOCATION OF TASK: NORMAL
M PROTEIN MFR UR ELPH: 36.4 %
M PROTEIN SERPL ELPH-MCNC: 1.1 G/DL
POTASSIUM SERPL-SCNC: 4.2 MMOL/L (ref 3.4–5.3)
PROT 24H UR-MRATE: 191.4 MG/SPECIMEN
PROT PATTERN SERPL ELPH-IMP: ABNORMAL
PROT PATTERN SERPL IFE-IMP: NORMAL
PROT PATTERN UR ELPH-IMP: ABNORMAL
PROT SERPL-MCNC: 7.8 G/DL (ref 6.4–8.3)
SODIUM SERPL-SCNC: 140 MMOL/L (ref 135–145)
SPECIMEN VOL UR: 2200 ML
TOTAL PROTEIN SERUM FOR ELP: 7.6 G/DL (ref 6.4–8.3)

## 2025-03-18 ENCOUNTER — VIRTUAL VISIT (OUTPATIENT)
Dept: ONCOLOGY | Facility: CLINIC | Age: 80
End: 2025-03-18
Attending: INTERNAL MEDICINE
Payer: COMMERCIAL

## 2025-03-18 VITALS — WEIGHT: 180 LBS | BODY MASS INDEX: 24.38 KG/M2 | HEIGHT: 72 IN

## 2025-03-18 DIAGNOSIS — D47.2 MGUS (MONOCLONAL GAMMOPATHY OF UNKNOWN SIGNIFICANCE): Primary | ICD-10-CM

## 2025-03-18 PROCEDURE — 98005 SYNCH AUDIO-VIDEO EST LOW 20: CPT | Performed by: INTERNAL MEDICINE

## 2025-03-18 PROCEDURE — 1126F AMNT PAIN NOTED NONE PRSNT: CPT | Performed by: INTERNAL MEDICINE

## 2025-03-18 ASSESSMENT — PAIN SCALES - GENERAL: PAINLEVEL_OUTOF10: NO PAIN (0)

## 2025-03-18 NOTE — PROGRESS NOTES
Virtual Visit Details    Type of service:  Video Visit   Video Start Time: 10:24 AM  Video End Time:10:34 AM    Originating Location (pt. Location): Home    Distant Location (provider location):  On-site  Platform used for Video Visit: [facility]      FOLLOW-UP VISIT NOTE    PATIENT NAME: Jaron Carrera MRN # [id number]  DATE OF VISIT: Mar 18, 2025 YOB: 1945        Reason for follow up   Monoclonal gammopathy    HISTORY:  He was being followed by Dr. Leary but then he has transferred his care to me.  I have reviewed his previous records and have copied and updated from prior notes.    80 year old  male with no significant past medical history who in April 2018 presented to primary care provider with complaints of fatigue. He was noted to have mild anemia was further workup was ordered including protein electrophoresis which revealed monoclonal paraprotein along with elevated ferritin and CRP. Subsequently serum immunofixation and urine immunofixation were requested which revealed 1.3 g/ dl IgG kappa paraprotein with IgG 1880 mg/dl.    05/21/18 negative for evidence of end organ damage( CRAB criteria)  bone marrow biopsy showing 2-3% plasma cells. 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    SUBJECTIVE     This is a video visit.  He feels well.  Denies any B symptoms.  No new pain and no new swellings.  Energy is fine.  No infections.       ROS:  Rest of the comprehensive review of the system was essentially unremarkable.    I reviewed other hx in Epic as below      PAST MEDICAL HISTORY     Past Medical History:   Diagnosis Date    Vitamin B12 deficiency     Vitamin D deficiencies          CURRENT OUTPATIENT MEDICATIONS     Current Outpatient Medications   Medication Sig Dispense Refill    Ferrous Sulfate (IRON SUPPLEMENT PO) Take 45 mg by mouth twice a week       VITAMIN B-12 PO Take one tablet every other  day      VITAMIN D PO Take one tablet by mouth daily       FAMILY HX:  Family History   Problem Relation Age of Onset    Respiratory Father         COPD    Lipids Brother     Prostate Cancer Brother     Other Cancer Brother     Thyroid Disease Daughter     Hypertension Brother     Prostate Cancer Brother     C.A.D. No family hx of     Cancer - colorectal No family hx of     Cerebrovascular Disease No family hx of     Diabetes No family hx of      1 brother had multiple myeloma- he had exposure to agent orange.  Another brother also has MGUS and he also is on observation.  3 daughters are healthy       ALLERGIES     Allergies   Allergen Reactions    Pcn [Penicillins] Hives     Childhood     SOCIAL HISTORY:  Social History     Socioeconomic History    Marital status:      Spouse name: Alyssa    Number of children: 3    Years of education: Not on file    Highest education level: Not on file   Occupational History    Occupation: certified      Employer: RETIRED   Tobacco Use    Smoking status: Former     Current packs/day: 0.00     Average packs/day: 1 pack/day for 2.0 years (2.0 ttl pk-yrs)     Types: Cigarettes     Start date: 1968     Quit date: 1970     Years since quittin.6    Smokeless tobacco: Never   Vaping Use    Vaping status: Never Used   Substance and Sexual Activity    Alcohol use: Yes     Alcohol/week: 1.7 standard drinks of alcohol     Types: 2 Cans of beer per week     Comment: 1-2 beers weekly    Drug use: No    Sexual activity: Not Currently     Partners: Female     Birth control/protection: None   Other Topics Concern    Parent/sibling w/ CABG, MI or angioplasty before 65F 55M? No   Social History Narrative    Not on file     Social Drivers of Health     Financial Resource Strain: Low Risk  (2024)    Financial Resource Strain     Within the past 12 months, have you or your family members you live with been unable to get utilities (heat, electricity) when it was  really needed?: No   Food Insecurity: Low Risk  (11/28/2024)    Food Insecurity     Within the past 12 months, did you worry that your food would run out before you got money to buy more?: No     Within the past 12 months, did the food you bought just not last and you didn t have money to get more?: No   Transportation Needs: Low Risk  (11/28/2024)    Transportation Needs     Within the past 12 months, has lack of transportation kept you from medical appointments, getting your medicines, non-medical meetings or appointments, work, or from getting things that you need?: No   Physical Activity: Sufficiently Active (11/28/2024)    Exercise Vital Sign     Days of Exercise per Week: 5 days     Minutes of Exercise per Session: 40 min   Stress: No Stress Concern Present (11/28/2024)    [employer] [employer] - Occupational Stress Questionnaire     Feeling of Stress : Not at all   Social Connections: Unknown (11/28/2024)    Social Connection and Isolation Panel [NHANES]     Frequency of Communication with Friends and Family: Not on file     Frequency of Social Gatherings with Friends and Family: Twice a week     Attends Sabianism Services: Not on file     Active Member of Clubs or Organizations: Not on file     Attends Club or Organization Meetings: Not on file     Marital Status: Not on file   Interpersonal Safety: Low Risk  (12/3/2024)    Interpersonal Safety     Do you feel physically and emotionally safe where you currently live?: Yes     Within the past 12 months, have you been hit, slapped, kicked or otherwise physically hurt by someone?: No     Within the past 12 months, have you been humiliated or emotionally abused in other ways by your partner or ex-partner?: No   Housing Stability: Low Risk  (11/28/2024)    Housing Stability     Do you have housing? : Yes     Are you worried about losing your housing?: No   No smoking. Drinks etoh occasionally.      PHYSICAL EXAM     There were no vitals taken  for this visit.  Wt Readings from Last 4 Encounters:   12/03/24 82.2 kg (181 lb 3.2 oz)   07/01/24 81.1 kg (178 lb 12.8 oz)   05/23/24 82.6 kg (182 lb)   04/30/24 81.6 kg (180 lb)         Constitutional.  Does not seem to be in any acute distress.  Eyes.  No redness or discharge noted.  Respiratory.  Speaking in full sentences.  Breathing seems comfortable without any accessory use of muscles.    Skin.  Visualized skin does not show any obvious rashes.  Musculoskeletal.  Range of motion for visualized areas is intact.  Neurological.  Alert and oriented x3.  Psychiatric.  Mood, mentation and affect are normal.  Decision making capacity is intact.           LABORATORY AND IMAGING STUDIES     Reviewed   3/12/2025    CBC unremarkable.  CMP unremarkable   SPEP showed stable M spike 1.1.  KATARZYNA shows monoclonal IgG kappa.  Urine M spike 36.4%.  KATARZYNA shows monoclonal free kappa light chain.  Serum IgG 1701.  IgA 73.  IgM 46.  These are stable.  Kappa 12.64.  Lambda 0.6.  Ratio 21.       ASSESSMENT AND PLAN     80 year old  male with workup of fatigue and mild anemia of inflammation in April 2018  was noted to have a monoclonal IgG Kappa paraprotein.     IgG kappa MGUS.  No evidence of myeloma.    He has remained on observation and overall feels well.  There was increase in the urine monoclonal protein/M spike in September 2023 so we repeated the test at 6 months interval but then it came down.  Urine monoclonal protein is again worse at 36.4%.  Serum kappa free light chain is slowly rising.  Light chain ratio is also more elevated/worse than before.  Otherwise CBC and chemistry are unremarkable.  Serum IgG level is stable.     Due to the above, I would recommend repeating MGUS labs in 6 months rather than 1 year.      Return to clinic in 6 months.     I answered all of his questions to his satisfaction.  He is agreeable and comfortable with the plan.      Ana Rosa Medel MD

## 2025-03-18 NOTE — LETTER
3/18/2025      Jaron Carrera  [address] [place] [address]      Dear Colleague,    Thank you for referring your patient, Jaron Carrera, to the [facility]. Please see a copy of my visit note below.    Virtual Visit Details    Type of service:  Video Visit   Video Start Time: 10:24 AM  Video End Time:10:34 AM    Originating Location (pt. Location): Home    Distant Location (provider location):  On-site  Platform used for Video Visit: [facility]      FOLLOW-UP VISIT NOTE    PATIENT NAME: Jaron Carrera MRN # [id number]  DATE OF VISIT: Mar 18, 2025 YOB: 1945        Reason for follow up   Monoclonal gammopathy    HISTORY:  He was being followed by Dr. Leary but then he has transferred his care to me.  I have reviewed his previous records and have copied and updated from prior notes.    80 year old  male with no significant past medical history who in April 2018 presented to primary care provider with complaints of fatigue. He was noted to have mild anemia was further workup was ordered including protein electrophoresis which revealed monoclonal paraprotein along with elevated ferritin and CRP. Subsequently serum immunofixation and urine immunofixation were requested which revealed 1.3 g/ dl IgG kappa paraprotein with IgG 1880 mg/dl.    05/21/18 negative for evidence of end organ damage( CRAB criteria)  bone marrow biopsy showing 2-3% plasma cells. 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    SUBJECTIVE     This is a video visit.  He feels well.  Denies any B symptoms.  No new pain and no new swellings.  Energy is fine.  No infections.       ROS:  Rest of the comprehensive review of the system was essentially unremarkable.    I reviewed other hx in Epic as below      PAST MEDICAL HISTORY     Past Medical History:   Diagnosis Date     Vitamin B12 deficiency       Vitamin D deficiencies          CURRENT OUTPATIENT MEDICATIONS     Current Outpatient Medications   Medication Sig Dispense Refill     Ferrous Sulfate (IRON SUPPLEMENT PO) Take 45 mg by mouth twice a week        VITAMIN B-12 PO Take one tablet every other day       VITAMIN D PO Take one tablet by mouth daily       FAMILY HX:  Family History   Problem Relation Age of Onset     Respiratory Father         COPD     Lipids Brother      Prostate Cancer Brother      Other Cancer Brother      Thyroid Disease Daughter      Hypertension Brother      Prostate Cancer Brother      C.A.D. No family hx of      Cancer - colorectal No family hx of      Cerebrovascular Disease No family hx of      Diabetes No family hx of      1 brother had multiple myeloma- he had exposure to agent orange.  Another brother also has MGUS and he also is on observation.  3 daughters are healthy       ALLERGIES     Allergies   Allergen Reactions     Pcn [Penicillins] Hives     Childhood     SOCIAL HISTORY:  Social History     Socioeconomic History     Marital status:      Spouse name: Alyssa     Number of children: 3     Years of education: Not on file     Highest education level: Not on file   Occupational History     Occupation: certified      Employer: RETIRED   Tobacco Use     Smoking status: Former     Current packs/day: 0.00     Average packs/day: 1 pack/day for 2.0 years (2.0 ttl pk-yrs)     Types: Cigarettes     Start date: 1968     Quit date: 1970     Years since quittin.6     Smokeless tobacco: Never   Vaping Use     Vaping status: Never Used   Substance and Sexual Activity     Alcohol use: Yes     Alcohol/week: 1.7 standard drinks of alcohol     Types: 2 Cans of beer per week     Comment: 1-2 beers weekly     Drug use: No     Sexual activity: Not Currently     Partners: Female     Birth control/protection: None   Other Topics Concern     Parent/sibling w/ CABG, MI or angioplasty before 65F 55M? No   Social  History Narrative     Not on file     Social Drivers of Health     Financial Resource Strain: Low Risk  (11/28/2024)    Financial Resource Strain      Within the past 12 months, have you or your family members you live with been unable to get utilities (heat, electricity) when it was really needed?: No   Food Insecurity: Low Risk  (11/28/2024)    Food Insecurity      Within the past 12 months, did you worry that your food would run out before you got money to buy more?: No      Within the past 12 months, did the food you bought just not last and you didn t have money to get more?: No   Transportation Needs: Low Risk  (11/28/2024)    Transportation Needs      Within the past 12 months, has lack of transportation kept you from medical appointments, getting your medicines, non-medical meetings or appointments, work, or from getting things that you need?: No   Physical Activity: Sufficiently Active (11/28/2024)    Exercise Vital Sign      Days of Exercise per Week: 5 days      Minutes of Exercise per Session: 40 min   Stress: No Stress Concern Present (11/28/2024)    [employer] [place] [employer] - Occupational Stress Questionnaire      Feeling of Stress : Not at all   Social Connections: Unknown (11/28/2024)    Social Connection and Isolation Panel [NHANES]      Frequency of Communication with Friends and Family: Not on file      Frequency of Social Gatherings with Friends and Family: Twice a week      Attends Taoist Services: Not on file      Active Member of Clubs or Organizations: Not on file      Attends Club or Organization Meetings: Not on file      Marital Status: Not on file   Interpersonal Safety: Low Risk  (12/3/2024)    Interpersonal Safety      Do you feel physically and emotionally safe where you currently live?: Yes      Within the past 12 months, have you been hit, slapped, kicked or otherwise physically hurt by someone?: No      Within the past 12 months, have you been humiliated or  emotionally abused in other ways by your partner or ex-partner?: No   Housing Stability: Low Risk  (11/28/2024)    Housing Stability      Do you have housing? : Yes      Are you worried about losing your housing?: No   No smoking. Drinks etoh occasionally.      PHYSICAL EXAM     There were no vitals taken for this visit.  Wt Readings from Last 4 Encounters:   12/03/24 82.2 kg (181 lb 3.2 oz)   07/01/24 81.1 kg (178 lb 12.8 oz)   05/23/24 82.6 kg (182 lb)   04/30/24 81.6 kg (180 lb)         Constitutional.  Does not seem to be in any acute distress.  Eyes.  No redness or discharge noted.  Respiratory.  Speaking in full sentences.  Breathing seems comfortable without any accessory use of muscles.    Skin.  Visualized skin does not show any obvious rashes.  Musculoskeletal.  Range of motion for visualized areas is intact.  Neurological.  Alert and oriented x3.  Psychiatric.  Mood, mentation and affect are normal.  Decision making capacity is intact.           LABORATORY AND IMAGING STUDIES     Reviewed   3/12/2025    CBC unremarkable.  CMP unremarkable   SPEP showed stable M spike 1.1.  KATARZYNA shows monoclonal IgG kappa.  Urine M spike 36.4%.  KATARZYNA shows monoclonal free kappa light chain.  Serum IgG 1701.  IgA 73.  IgM 46.  These are stable.  Kappa 12.64.  Lambda 0.6.  Ratio 21.       ASSESSMENT AND PLAN     80 year old  male with workup of fatigue and mild anemia of inflammation in April 2018  was noted to have a monoclonal IgG Kappa paraprotein.     IgG kappa MGUS.  No evidence of myeloma.    He has remained on observation and overall feels well.  There was increase in the urine monoclonal protein/M spike in September 2023 so we repeated the test at 6 months interval but then it came down.  Urine monoclonal protein is again worse at 36.4%.  Serum kappa free light chain is slowly rising.  Light chain ratio is also more elevated/worse than before.  Otherwise CBC and chemistry are unremarkable.  Serum IgG level is stable.      Due to the above, I would recommend repeating MGUS labs in 6 months rather than 1 year.      Return to clinic in 6 months.     I answered all of his questions to his satisfaction.  He is agreeable and comfortable with the plan.      Ana Rosa Medel MD         Again, thank you for allowing me to participate in the care of your patient.        Sincerely,        Ana Rosa Medel MD    Electronically signed

## 2025-03-18 NOTE — NURSING NOTE
Patient confirms medications and allergies are accurate via patients echeck in completion, and or denies any changes since last reviewed/verified.     Current patient location: [address] [place], [place] [address] [place] [address]    Is the patient currently in the state of [place]? YES    Visit mode: VIDEO    If the visit is dropped, the patient can be reconnected by:VIDEO VISIT: Text to cell phone:   Telephone Information:   Mobile [phone]       Will anyone else be joining the visit? NO wife mary  (If patient encounters technical issues they should call [phone]    Are changes needed to the allergy or medication list? No    Are refills needed on medications prescribed by this physician? NO    Rooming Documentation:  Questionnaire(s) completed    Reason for visit: RECHECK    Alisia PAYTON

## 2025-04-16 ENCOUNTER — OFFICE VISIT (OUTPATIENT)
Dept: FAMILY MEDICINE | Facility: CLINIC | Age: 80
End: 2025-04-16
Payer: COMMERCIAL

## 2025-04-16 ENCOUNTER — ANCILLARY PROCEDURE (OUTPATIENT)
Dept: GENERAL RADIOLOGY | Facility: CLINIC | Age: 80
End: 2025-04-16
Attending: PHYSICIAN ASSISTANT
Payer: COMMERCIAL

## 2025-04-16 VITALS
HEART RATE: 87 BPM | OXYGEN SATURATION: 98 % | DIASTOLIC BLOOD PRESSURE: 66 MMHG | SYSTOLIC BLOOD PRESSURE: 128 MMHG | HEIGHT: 72 IN | TEMPERATURE: 97.2 F | BODY MASS INDEX: 24.65 KG/M2 | RESPIRATION RATE: 20 BRPM | WEIGHT: 182 LBS

## 2025-04-16 DIAGNOSIS — R10.30 LOWER ABDOMINAL PAIN: ICD-10-CM

## 2025-04-16 DIAGNOSIS — Z12.11 COLON CANCER SCREENING: ICD-10-CM

## 2025-04-16 DIAGNOSIS — K59.09 OTHER CONSTIPATION: ICD-10-CM

## 2025-04-16 DIAGNOSIS — R10.30 LOWER ABDOMINAL PAIN: Primary | ICD-10-CM

## 2025-04-16 DIAGNOSIS — Z23 NEED FOR VACCINATION: ICD-10-CM

## 2025-04-16 DIAGNOSIS — N41.0 ACUTE PROSTATITIS: ICD-10-CM

## 2025-04-16 LAB
ALBUMIN UR-MCNC: NEGATIVE MG/DL
APPEARANCE UR: CLEAR
BACTERIA #/AREA URNS HPF: ABNORMAL /HPF
BASOPHILS # BLD AUTO: 0.1 10E3/UL (ref 0–0.2)
BASOPHILS NFR BLD AUTO: 1 %
BILIRUB UR QL STRIP: NEGATIVE
COLOR UR AUTO: YELLOW
EOSINOPHIL # BLD AUTO: 0.3 10E3/UL (ref 0–0.7)
EOSINOPHIL NFR BLD AUTO: 4 %
ERYTHROCYTE [DISTWIDTH] IN BLOOD BY AUTOMATED COUNT: 12.3 % (ref 10–15)
GLUCOSE UR STRIP-MCNC: NEGATIVE MG/DL
HCT VFR BLD AUTO: 44.9 % (ref 40–53)
HGB BLD-MCNC: 15.5 G/DL (ref 13.3–17.7)
HGB UR QL STRIP: ABNORMAL
IMM GRANULOCYTES # BLD: 0 10E3/UL
IMM GRANULOCYTES NFR BLD: 0 %
KETONES UR STRIP-MCNC: NEGATIVE MG/DL
LEUKOCYTE ESTERASE UR QL STRIP: NEGATIVE
LYMPHOCYTES # BLD AUTO: 1.1 10E3/UL (ref 0.8–5.3)
LYMPHOCYTES NFR BLD AUTO: 14 %
MCH RBC QN AUTO: 33.3 PG (ref 26.5–33)
MCHC RBC AUTO-ENTMCNC: 34.5 G/DL (ref 31.5–36.5)
MCV RBC AUTO: 96 FL (ref 78–100)
MONOCYTES # BLD AUTO: 0.5 10E3/UL (ref 0–1.3)
MONOCYTES NFR BLD AUTO: 6 %
NEUTROPHILS # BLD AUTO: 5.8 10E3/UL (ref 1.6–8.3)
NEUTROPHILS NFR BLD AUTO: 75 %
NITRATE UR QL: NEGATIVE
PH UR STRIP: 6 [PH] (ref 5–7)
PLATELET # BLD AUTO: 196 10E3/UL (ref 150–450)
RBC # BLD AUTO: 4.66 10E6/UL (ref 4.4–5.9)
RBC #/AREA URNS AUTO: ABNORMAL /HPF
SP GR UR STRIP: <=1.005 (ref 1–1.03)
SQUAMOUS #/AREA URNS AUTO: ABNORMAL /LPF
UROBILINOGEN UR STRIP-ACNC: 1 E.U./DL
WBC # BLD AUTO: 7.7 10E3/UL (ref 4–11)
WBC #/AREA URNS AUTO: ABNORMAL /HPF

## 2025-04-16 PROCEDURE — 85025 COMPLETE CBC W/AUTO DIFF WBC: CPT | Performed by: PHYSICIAN ASSISTANT

## 2025-04-16 PROCEDURE — 1125F AMNT PAIN NOTED PAIN PRSNT: CPT | Performed by: PHYSICIAN ASSISTANT

## 2025-04-16 PROCEDURE — 3074F SYST BP LT 130 MM HG: CPT | Performed by: PHYSICIAN ASSISTANT

## 2025-04-16 PROCEDURE — 99214 OFFICE O/P EST MOD 30 MIN: CPT | Performed by: PHYSICIAN ASSISTANT

## 2025-04-16 PROCEDURE — 36415 COLL VENOUS BLD VENIPUNCTURE: CPT | Performed by: PHYSICIAN ASSISTANT

## 2025-04-16 PROCEDURE — 81001 URINALYSIS AUTO W/SCOPE: CPT | Performed by: PHYSICIAN ASSISTANT

## 2025-04-16 PROCEDURE — 3078F DIAST BP <80 MM HG: CPT | Performed by: PHYSICIAN ASSISTANT

## 2025-04-16 PROCEDURE — 74019 RADEX ABDOMEN 2 VIEWS: CPT | Mod: TC | Performed by: RADIOLOGY

## 2025-04-16 RX ORDER — DOXYCYCLINE HYCLATE 100 MG
100 TABLET ORAL 2 TIMES DAILY
Qty: 28 TABLET | Refills: 0 | Status: SHIPPED | OUTPATIENT
Start: 2025-04-16 | End: 2025-04-30

## 2025-04-16 ASSESSMENT — PAIN SCALES - GENERAL: PAINLEVEL_OUTOF10: SEVERE PAIN (8)

## 2025-04-16 NOTE — PROGRESS NOTES
Subjective   Rose is a 80 year old, presenting for the following health issues:  Abdominal Pain      4/16/2025    10:10 AM   Additional Questions   Roomed by Flores Lamb CMA   Accompanied by None     History of Present Illness       Reason for visit:  Stomach pain  Symptom onset:  1-2 weeks ago  Symptoms include:  Stomach pain  Symptom intensity:  Moderate  Symptom progression:  Worsening  Had these symptoms before:  Yes  Has tried/received treatment for these symptoms:  Yes  Previous treatment was successful:  Yes  Prior treatment description:  Laxative  What makes it worse:  No  What makes it better:  No   He is taking medications regularly.      Some constipation.  Some lower abdominopelvic pain.   No fevers. Occasional chills.   No dysuria. Some urgency. No blood in stools.   No testicular pain.   No anal discomfort.     Review of Systems  Constitutional, HEENT, cardiovascular, pulmonary, GI, , musculoskeletal, neuro, skin, endocrine and psych systems are negative, except as otherwise noted.      Objective    /66   Pulse 87   Temp 97.2  F (36.2  C) (Temporal)   Resp 20   Ht 1.829 m (6')   Wt 82.6 kg (182 lb)   SpO2 98%   BMI 24.68 kg/m    Body mass index is 24.68 kg/m .  Physical Exam   Eye exam - right eye normal lid, conjunctiva, cornea, pupil and fundus, left eye normal lid, conjunctiva, cornea, pupil and fundus.  Thyroid not palpable, not enlarged, no nodules detected.  CHEST:chest clear to IPPA, no tachypnea, retractions or cyanosis, and S1, S2 normal, no murmur, no gallop, rate regular.  ABDOMEN: mild tenderness in the lower abdomen area, without rebound, guarding, mass or organomegaly. Abdomen is soft and bowel sounds are normal. No hernias   Rectal exam negative for masses or tenderness.   Rose was seen today for abdominal pain.    Diagnoses and all orders for this visit:    Lower abdominal pain  -     CBC with platelets and differential; Future  -     UA Macroscopic with reflex  to Microscopic and Culture - Lab Collect; Future  -     XR Abdomen 2 Views; Future  -     CBC with platelets and differential  -     UA Macroscopic with reflex to Microscopic and Culture - Lab Collect  -     UA Microscopic with Reflex to Culture  -     CT Abdomen Pelvis w Contrast; Future    Need for vaccination  -     Tdap, tetanus-diptheria-acell pertussis, (BOOSTRIX) 5-2.5-18.5 LF-MCG/0.5 KARY injection; Inject 0.5 mLs into the muscle once for 1 dose.  -     RSV vaccine, bivalent, ABRYSVO, injection; Inject 0.5 mLs into the muscle once for 1 dose. Pharmacist administered    Colon cancer screening  -     Colonoscopy Screening  Referral; Future    Other constipation      Continue with an over the counter osmotic laxative.   Advised supportive and symptomatic treatment.  Follow up with Provider - if condition persists or worsens.     Signed Electronically by: Cirilo Spain PA-C